# Patient Record
Sex: FEMALE | Race: BLACK OR AFRICAN AMERICAN | NOT HISPANIC OR LATINO | Employment: OTHER | URBAN - METROPOLITAN AREA
[De-identification: names, ages, dates, MRNs, and addresses within clinical notes are randomized per-mention and may not be internally consistent; named-entity substitution may affect disease eponyms.]

---

## 2017-01-23 RX ORDER — ROSUVASTATIN CALCIUM 10 MG/1
TABLET, COATED ORAL
Qty: 30 TABLET | Refills: 6 | Status: SHIPPED | OUTPATIENT
Start: 2017-01-23 | End: 2017-08-29 | Stop reason: SDUPTHER

## 2017-03-09 ENCOUNTER — PATIENT OUTREACH (OUTPATIENT)
Dept: ADMINISTRATIVE | Facility: HOSPITAL | Age: 72
End: 2017-03-09
Payer: MEDICARE

## 2017-03-09 DIAGNOSIS — M89.9 BONE DISORDER: Primary | ICD-10-CM

## 2017-03-09 NOTE — LETTER
March 9, 2017    Natalia R Mike  [address] [address]             [facility]  [address] [facility] [address]  Phone: [phone] Dear Mrs. Mckeon:    [facility] is committed to your overall health.  To help you get the most out of each of your visits, we will review your information to make sure you are up to date on all of your recommended tests and/or procedures.      Jen Santos MD has found that you may be due for          DEXA SCAN     Zoster Vaccine     Mammogram     Pneumococcal (65+) (2 of 2 - PPSV23)    Eye Exam         If you have had any of the above done at another facility, please bring the records or information with you so that your record at [facility] will be complete.    If you are currently taking medication, please bring it with you to your appointment for review.    We will be happy to assist you with scheduling any necessary appointments or you may contact the [facility] appointment desk at [phone] to schedule at your convenience.     Thank you for choosing [facility] for your healthcare needs,        Bobbye, LPN  Care Coordination Department  [facility]

## 2017-03-17 ENCOUNTER — LAB VISIT (OUTPATIENT)
Dept: LAB | Facility: HOSPITAL | Age: 72
End: 2017-03-17
Attending: INTERNAL MEDICINE
Payer: MEDICARE

## 2017-03-17 DIAGNOSIS — E11.43 CONTROLLED TYPE 2 DIABETES MELLITUS WITH DIABETIC AUTONOMIC NEUROPATHY, WITHOUT LONG-TERM CURRENT USE OF INSULIN: ICD-10-CM

## 2017-03-17 PROCEDURE — 36415 COLL VENOUS BLD VENIPUNCTURE: CPT | Mod: PO

## 2017-03-17 PROCEDURE — 83036 HEMOGLOBIN GLYCOSYLATED A1C: CPT

## 2017-03-18 LAB
ESTIMATED AVG GLUCOSE: 151 MG/DL
HBA1C MFR BLD HPLC: 6.9 %

## 2017-03-24 ENCOUNTER — TELEPHONE (OUTPATIENT)
Dept: INTERNAL MEDICINE | Facility: CLINIC | Age: 72
End: 2017-03-24

## 2017-03-24 ENCOUNTER — HOSPITAL ENCOUNTER (OUTPATIENT)
Dept: RADIOLOGY | Facility: HOSPITAL | Age: 72
Discharge: HOME | End: 2017-03-24
Attending: INTERNAL MEDICINE
Payer: MEDICARE

## 2017-03-24 ENCOUNTER — OFFICE VISIT (OUTPATIENT)
Dept: INTERNAL MEDICINE | Facility: CLINIC | Age: 72
End: 2017-03-24
Payer: MEDICARE

## 2017-03-24 VITALS
HEIGHT: 63 IN | SYSTOLIC BLOOD PRESSURE: 118 MMHG | BODY MASS INDEX: 24.92 KG/M2 | HEART RATE: 74 BPM | WEIGHT: 140.63 LBS | TEMPERATURE: 97 F | OXYGEN SATURATION: 97 % | DIASTOLIC BLOOD PRESSURE: 64 MMHG

## 2017-03-24 DIAGNOSIS — R10.9 LEFT FLANK PAIN: ICD-10-CM

## 2017-03-24 DIAGNOSIS — I10 ESSENTIAL HYPERTENSION: ICD-10-CM

## 2017-03-24 DIAGNOSIS — E11.41 CONTROLLED TYPE 2 DIABETES MELLITUS WITH DIABETIC MONONEUROPATHY, WITHOUT LONG-TERM CURRENT USE OF INSULIN: ICD-10-CM

## 2017-03-24 DIAGNOSIS — F41.9 ANXIETY: Primary | ICD-10-CM

## 2017-03-24 DIAGNOSIS — J45.20 MILD INTERMITTENT ASTHMA WITHOUT COMPLICATION: ICD-10-CM

## 2017-03-24 PROCEDURE — 99214 OFFICE O/P EST MOD 30 MIN: CPT | Mod: S$PBB,,, | Performed by: INTERNAL MEDICINE

## 2017-03-24 PROCEDURE — 74000 XR ABDOMEN AP 1 VIEW: CPT | Mod: TC,PO

## 2017-03-24 PROCEDURE — 99999 PR PBB SHADOW E&M-EST. PATIENT-LVL III: CPT | Mod: PBBFAC,,, | Performed by: INTERNAL MEDICINE

## 2017-03-24 PROCEDURE — 74000 XR ABDOMEN AP 1 VIEW: CPT | Mod: 26,,, | Performed by: RADIOLOGY

## 2017-03-24 RX ORDER — CIPROFLOXACIN 250 MG/1
250 TABLET, FILM COATED ORAL 2 TIMES DAILY
Qty: 14 TABLET | Refills: 0 | Status: SHIPPED | OUTPATIENT
Start: 2017-03-24 | End: 2017-03-31

## 2017-03-24 NOTE — PROGRESS NOTES
"Subjective:       Patient ID: Natalia Mckeon is a 72 y.o. female.    Chief Complaint: Follow-up    HPI Comments: Here for follow up of medical problems and intermittent left flank pain.  Pain is sharp, not radiating to front, once to twice a week.  Sx present for 2 years.  No dysuria.  BMs have been regular and normal.  Not exercising.  Sugars  ac breakfast.  No cp/sob/palp.  Anxiety doing well.    Updated/ annual due 11/17:  HM: 11/16 fluvax, 7/15 [id number], 2013 [id number] in [place], 6/14 TDaP, 10/14 zostervax at pharmacy, 6/16 mmg/Gyn Dr. Mariscal, bmd per her, 3/14 Cscope Dr. Auguste rep 3y, 2/16 Eye Dr. Chairez, 11/16 HCV neg.        Review of Systems   Constitutional: Negative for chills, diaphoresis and fever.   Respiratory: Negative for cough and shortness of breath.    Cardiovascular: Negative for chest pain, palpitations and leg swelling.   Gastrointestinal: Negative for blood in stool, constipation, diarrhea, nausea and vomiting.   Genitourinary: Negative for dysuria, frequency and hematuria.   Psychiatric/Behavioral: The patient is not nervous/anxious.        Objective:   /64 (BP Location: Right arm)  Pulse 74  Temp 97 °F (36.1 °C) (Tympanic)   Ht 5' 2.5" (1.588 m)  Wt 63.8 kg (140 lb 10.5 oz)  SpO2 97%  BMI 25.32 kg/m2    Physical Exam   Constitutional: She is oriented to person, place, and time. She appears well-developed.   HENT:   Mouth/Throat: Oropharynx is clear and moist.   Neck: Neck supple. Carotid bruit is not present. No thyroid mass present.   Cardiovascular: Normal rate, regular rhythm and intact distal pulses.  Exam reveals no gallop and no friction rub.    No murmur heard.  Pulmonary/Chest: Effort normal and breath sounds normal. She has no wheezes. She has no rales.   Abdominal: Soft. Bowel sounds are normal. She exhibits no mass. There is no hepatosplenomegaly. There is no tenderness (nontender left flank.).   Musculoskeletal: She exhibits no edema.   Lymphadenopathy:     She " has no cervical adenopathy.   Neurological: She is alert and oriented to person, place, and time.   Psychiatric: She has a normal mood and affect.     Results for OTILIO PEOPLES (MRN [id number]) as of 3/24/2017 08:46   Ref. Range 10/26/2016 08:07 10/26/2016 08:07 10/26/2016 09:24 3/17/2017 09:43   Hemoglobin A1C Latest Ref Range: 4.5 - 6.2 % 6.8 (H)   6.9 (H)   Estimated Avg Glucose Latest Ref Range: 68 - 131 mg/dL 148 (H)   151 (H)     Assessment:       1. Anxiety    2. Essential hypertension    3. Mild intermittent asthma without complication    4. Controlled type 2 diabetes mellitus with diabetic mononeuropathy, without long-term current use of insulin    5. Left flank pain        Plan:       Otilio was seen today for follow-up.    Diagnoses and all orders for this visit:    Anxiety- stable on rx.    Essential hypertension- stable on rx.    Mild intermittent asthma without complication- stable on rx.    Controlled type 2 diabetes mellitus with diabetic mononeuropathy, without long-term current use of insulin- stable.    Left flank pain- more frequency now- tests with MS, poss u/s abd.  -     Urinalysis; Future  -     X-Ray Abdomen AP 1 View; Future    RTC 2mo.

## 2017-03-24 NOTE — MR AVS SNAPSHOT
[facility]  [address] [place] [address]  Phone: [phone]  Fax: [phone]                  Natalia Mckeon   3/24/2017 8:40 AM   Office Visit    Description:  Female : 1945   Provider:  Jen Carlton MD   Department:  [facility]           Reason for Visit     Follow-up           Diagnoses this Visit        Comments    Anxiety    -  Primary     Essential hypertension         Mild intermittent asthma without complication         Controlled type 2 diabetes mellitus with diabetic mononeuropathy, without long-term current use of insulin         Left flank pain                To Do List           Future Appointments        Provider Department Dept Phone    3/24/2017 10:45 AM [facility] XR2 [facility] [phone]    3/24/2017 11:20 AM SPECIMEN, [facility] [phone]    3/24/2017 1:00 PM Jen Carlton MD [facility] [phone]    2017 4:40 PM Jen Carlton MD [facility] [phone]      Goals (5 Years of Data)     None      Follow-Up and Disposition     Return in about 2 months (around 2017).    Follow-up and Disposition History      [facility]     [facility] Care Line -  Assistance  Registered nurses in the [facility] provide clinical advisement, health education, appointment booking, and other advisory services.  Call for this free service at [phone].             Medications           Message regarding Medications     Verify the changes and/or additions to your medication regime listed below are the same as discussed with your clinician today.  If any of these changes or additions are incorrect, please notify your healthcare provider.             Verify that the below list of medications is an accurate representation of the medications you are currently taking.  If none reported, the list may be blank. If incorrect, please  "contact your healthcare provider. Carry this list with you in case of emergency.           Current Medications     ACCU-CHEK COMPACT TEST Strp TEST three times a day    ACCU-CHEK COMPACT TEST Strp TEST three times a day    CALCIUM CARBONATE (CALCIUM 300 ORAL) Take by mouth.    cyanocobalamin, vitamin B-12, (VITAMIN B-12) 5,000 mcg Subl Place under the tongue.    fluticasone (FLONASE) 50 mcg/actuation nasal spray 2 sprays by Each Nare route once daily.    levothyroxine (SYNTHROID) 25 MCG tablet take 1 tablet by mouth once daily    metformin (GLUCOPHAGE) 500 MG tablet take 1 tablet by mouth twice a day with food    olmesartan-amlodipin-hcthiazid (TRIBENZOR) 40-10-12.5 mg Tab Take by mouth.    omeprazole (PRILOSEC) 40 MG capsule Take 1 capsule (40 mg total) by mouth once daily.    PHENAZOPYRIDINE HCL (AZO ORAL) Take by mouth.    polyethylene glycol (GLYCOLAX) 17 gram/dose powder     potassium chloride SA (K-DUR,KLOR-CON) 20 MEQ tablet take 1 tablet by mouth once daily    rosuvastatin (CRESTOR) 10 MG tablet take 1 tablet by mouth once daily    sertraline (ZOLOFT) 50 MG tablet take 1 1/2 tablet by mouth every evening    fluticasone-salmeterol 250-50 mcg/dose (ADVAIR) 250-50 mcg/dose diskus inhaler Inhale 1 puff into the lungs 2 (two) times daily.           Clinical Reference Information           Your Vitals Were     BP Pulse Temp Height Weight SpO2    118/64 (BP Location: Right arm) 74 97 °F (36.1 °C) (Tympanic) 5' 2.5" (1.588 m) 63.8 kg (140 lb 10.5 oz) 97%    BMI                25.32 kg/m2          Blood Pressure          Most Recent Value    BP  118/64      Allergies as of 3/24/2017     Sulfa (Sulfonamide Antibiotics)    Aspirin    Methocarbamol      Immunizations Administered on Date of Encounter - 3/24/2017     None      Orders Placed During Today's Visit     Future Labs/Procedures Expected by Expires    Urinalysis  3/24/2017 6/22/2017    X-Ray Abdomen AP 1 View  3/24/2017 3/24/2018      [facility] Sign-Up     " Activating your [facility] account is as easy as 1-2-3!     1) Visit [email or web address], select Sign Up Now, enter this activation code and your date of birth, then select Next.  [email or web address]  Expires: 5/8/2017  9:05 AM      2) Create a username and password to use when you visit [facility] in the future and select a security question in case you lose your password and select Next.    3) Enter your e-mail address and click Sign Up!    Additional Information  If you have questions, please e-mail [email or web address] or call [phone] to talk to our [facility] staff. Remember, [facility] is NOT to be used for urgent needs. For medical emergencies, dial 911.         Language Assistance Services     ATTENTION: Language assistance services are available, free of charge. Please call [phone].      ATENCIÓN: Si habla henna, tiene a hardin disposición servicios gratuitos de asistencia lingüística. Llame al [phone].     CHÚ Ý: N?u b?n nói Ti?ng Vi?t, có các d?ch v? h? tr? ngôn ng? mi?n phí dành cho b?n. G?i s? [phone].         [facility] complies with applicable Federal civil rights laws and does not discriminate on the basis of race, color, national origin, age, disability, or sex.

## 2017-03-24 NOTE — TELEPHONE ENCOUNTER
Please tell pt she does have urine infection, rx sent to her RA to take for a week.  KUB doesn't show kidney stone but there is lots of stool present.  I recommend a half to whole bottle of Magnesium citrate to clear out her bowels that may be contributing to her pain.  Let me know if flank pain persists after these treatments.  SM

## 2017-05-19 ENCOUNTER — TELEPHONE (OUTPATIENT)
Dept: INTERNAL MEDICINE | Facility: CLINIC | Age: 72
End: 2017-05-19

## 2017-05-19 NOTE — TELEPHONE ENCOUNTER
Would you like to order labs and I can schedule her for fasting labs before her appointment, last full labs were 10/26/2016    Not due for routine labs until Nov, but does need to come in.  SM

## 2017-05-19 NOTE — TELEPHONE ENCOUNTER
----- Message from Milla Jay sent at 5/18/2017 12:13 PM CDT -----  Contact: pt   Pt is calling nurse staff regarding if patient need lab work before appt. Pt call back [phone] to be advised. thanks

## 2017-05-24 ENCOUNTER — OFFICE VISIT (OUTPATIENT)
Dept: INTERNAL MEDICINE | Facility: CLINIC | Age: 72
End: 2017-05-24
Payer: MEDICARE

## 2017-05-24 VITALS
BODY MASS INDEX: 24.96 KG/M2 | OXYGEN SATURATION: 97 % | HEIGHT: 63 IN | TEMPERATURE: 98 F | HEART RATE: 74 BPM | DIASTOLIC BLOOD PRESSURE: 66 MMHG | WEIGHT: 140.88 LBS | SYSTOLIC BLOOD PRESSURE: 110 MMHG

## 2017-05-24 DIAGNOSIS — E11.40 CONTROLLED TYPE 2 DIABETES MELLITUS WITH DIABETIC NEUROPATHY, WITHOUT LONG-TERM CURRENT USE OF INSULIN: ICD-10-CM

## 2017-05-24 DIAGNOSIS — E55.9 VITAMIN D DEFICIENCY: ICD-10-CM

## 2017-05-24 DIAGNOSIS — I10 ESSENTIAL HYPERTENSION: ICD-10-CM

## 2017-05-24 DIAGNOSIS — J45.20 MILD INTERMITTENT ASTHMA WITHOUT COMPLICATION: ICD-10-CM

## 2017-05-24 DIAGNOSIS — F41.9 ANXIETY: ICD-10-CM

## 2017-05-24 DIAGNOSIS — E78.00 PURE HYPERCHOLESTEROLEMIA: Primary | ICD-10-CM

## 2017-05-24 DIAGNOSIS — Z29.9 PREVENTIVE MEASURE: ICD-10-CM

## 2017-05-24 PROCEDURE — 99214 OFFICE O/P EST MOD 30 MIN: CPT | Mod: S$PBB,,, | Performed by: INTERNAL MEDICINE

## 2017-05-24 PROCEDURE — 99999 PR PBB SHADOW E&M-EST. PATIENT-LVL III: CPT | Mod: PBBFAC,,, | Performed by: INTERNAL MEDICINE

## 2017-05-24 PROCEDURE — 99213 OFFICE O/P EST LOW 20 MIN: CPT | Mod: PBBFAC,PO | Performed by: INTERNAL MEDICINE

## 2017-05-24 NOTE — PROGRESS NOTES
"Subjective:       Patient ID: Natalia Mckeon is a 72 y.o. female.    Chief Complaint: Follow-up    Here for follow up of medical problems.  BP max at home sys 130.  Will see Gyn next mo with mmg.  Sugars  ac breakfast.  No f/c/sw/cough.  No cp/sob/palp.  Breathing well.  Takes advair only "as needed."    Updated/ annual due 11/17:  HM: 11/16 fluvax, 7/15 [id number], 2013 [id number] in [place], 6/14 TDaP, 10/14 zostervax at [facility], 6/16 mmg/Gyn Dr. Mariscal, bmd per her, 3/14 Cscope Dr. Auguste rep 4y, 2/16 Eye Dr. Chairez, 11/16 HCV neg.          Review of Systems   Constitutional: Negative for chills, diaphoresis and fever.   Respiratory: Negative for cough and shortness of breath.    Cardiovascular: Negative for chest pain, palpitations and leg swelling.   Gastrointestinal: Negative for blood in stool, constipation, diarrhea, nausea and vomiting.   Genitourinary: Negative for dysuria, frequency and hematuria.   Psychiatric/Behavioral: The patient is not nervous/anxious.        Objective:   /66 (BP Location: Right arm)   Pulse 74   Temp 97.9 °F (36.6 °C) (Tympanic)   Ht 5' 2.5" (1.588 m)   Wt 63.9 kg (140 lb 14 oz)   SpO2 97%   BMI 25.36 kg/m²     Physical Exam   Constitutional: She is oriented to person, place, and time. She appears well-developed.   HENT:   Mouth/Throat: Oropharynx is clear and moist.   Neck: Neck supple. Carotid bruit is not present. No thyroid mass present.   Cardiovascular: Normal rate, regular rhythm and intact distal pulses.  Exam reveals no gallop and no friction rub.    No murmur heard.  Pulmonary/Chest: Effort normal and breath sounds normal. She has no wheezes. She has no rales.   Abdominal: Soft. Bowel sounds are normal. She exhibits no mass. There is no hepatosplenomegaly. There is no tenderness.   Musculoskeletal: She exhibits no edema.   Lymphadenopathy:     She has no cervical adenopathy.   Neurological: She is alert and oriented to person, place, and time. "   Psychiatric: She has a normal mood and affect.     Results for OTILIO PEOPLES (MRN [id number]) as of 5/24/2017 16:40   Ref. Range 3/17/2017 09:43   Hemoglobin A1C Latest Ref Range: 4.5 - 6.2 % 6.9 (H)   Estimated Avg Glucose Latest Ref Range: 68 - 131 mg/dL 151 (H)     Assessment:       1. Pure hypercholesterolemia    2. Essential hypertension    3. Mild intermittent asthma without complication    4. Controlled type 2 diabetes mellitus with diabetic neuropathy, without long-term current use of insulin    5. Anxiety    6. Vitamin D deficiency    7. Preventive measure        Plan:       Otilio was seen today for follow-up.    Diagnoses and all orders for this visit:    Pure hypercholesterolemia- on statin, allergy to asa.    Essential hypertension- stable on rx.    Mild intermittent asthma without complication- stable.    Controlled type 2 diabetes mellitus with diabetic neuropathy, without long-term current use of insulin- doing well on metformin only.  -     Hemoglobin A1c; Future  -     Microalbumin/creatinine urine ratio; Future    Anxiety- stable on sertraline.    Vitamin D deficiency- check lab.  -     Vitamin D; Future    Preventive measure- due in 6mo.  -     Comprehensive metabolic panel; Future  -     Lipid panel; Future  -     CBC auto differential; Future  -     TSH; Future  -     Vitamin D; Future  -     Hemoglobin A1c; Future  -     Microalbumin/creatinine urine ratio; Future

## 2017-07-27 DIAGNOSIS — E11.9 TYPE 2 DIABETES MELLITUS WITHOUT COMPLICATION: ICD-10-CM

## 2017-07-27 RX ORDER — METFORMIN HYDROCHLORIDE 500 MG/1
TABLET ORAL
Qty: 60 TABLET | Refills: 6 | Status: SHIPPED | OUTPATIENT
Start: 2017-07-27 | End: 2017-11-29 | Stop reason: SDUPTHER

## 2017-07-31 DIAGNOSIS — R10.13 DYSPEPSIA: ICD-10-CM

## 2017-07-31 RX ORDER — OMEPRAZOLE 40 MG/1
CAPSULE, DELAYED RELEASE ORAL
Qty: 30 CAPSULE | Refills: 11 | Status: SHIPPED | OUTPATIENT
Start: 2017-07-31 | End: 2018-09-05 | Stop reason: SDUPTHER

## 2017-08-22 DIAGNOSIS — F41.9 ANXIETY: ICD-10-CM

## 2017-08-22 RX ORDER — SERTRALINE HYDROCHLORIDE 50 MG/1
TABLET, FILM COATED ORAL
Qty: 45 TABLET | Refills: 6 | Status: SHIPPED | OUTPATIENT
Start: 2017-08-22 | End: 2020-07-28 | Stop reason: SDUPTHER

## 2017-08-29 RX ORDER — ROSUVASTATIN CALCIUM 10 MG/1
TABLET, COATED ORAL
Qty: 30 TABLET | Refills: 6 | Status: SHIPPED | OUTPATIENT
Start: 2017-08-29 | End: 2018-05-30 | Stop reason: SDUPTHER

## 2017-11-16 ENCOUNTER — PATIENT OUTREACH (OUTPATIENT)
Dept: ADMINISTRATIVE | Facility: HOSPITAL | Age: 72
End: 2017-11-16

## 2017-11-16 NOTE — LETTER
November 16, 2017    Natalia R Mike  [address] [address]             [facility]  [address]  [facility] [address]  Phone: [phone] Dear Mrs. Mckeon:    [facility] is committed to your overall health.  To help you get the most out of each of your visits, we will review your information to make sure you are up to date on all of your recommended tests and/or procedures.      Jen Santos MD has found that you may be due for   Health Maintenance Due   Topic    DEXA SCAN     Zoster Vaccine     Pneumococcal (65+) (2 of 2 - PPSV23)    Influenza Vaccine     Eye Exam     Foot Exam         If you have had any of the above done at another facility, please bring the records or information with you so that your record at [facility] will be complete.    If you are currently taking medication, please bring it with you to your appointment for review.    We will be happy to assist you with scheduling any necessary appointments or you may contact the [facility] appointment desk at [phone] to schedule at your convenience.     Thank you for choosing [facility] for your healthcare needs,        Bobbye, LPN  Care Coordination Department  [facility]  [phone]

## 2017-11-22 ENCOUNTER — LAB VISIT (OUTPATIENT)
Dept: LAB | Facility: HOSPITAL | Age: 72
End: 2017-11-22
Attending: INTERNAL MEDICINE
Payer: MEDICARE

## 2017-11-22 DIAGNOSIS — Z29.9 PREVENTIVE MEASURE: ICD-10-CM

## 2017-11-22 DIAGNOSIS — E55.9 VITAMIN D DEFICIENCY: ICD-10-CM

## 2017-11-22 DIAGNOSIS — E11.40 CONTROLLED TYPE 2 DIABETES MELLITUS WITH DIABETIC NEUROPATHY, WITHOUT LONG-TERM CURRENT USE OF INSULIN: ICD-10-CM

## 2017-11-22 DIAGNOSIS — E78.00 PURE HYPERCHOLESTEROLEMIA: ICD-10-CM

## 2017-11-22 LAB
25(OH)D3+25(OH)D2 SERPL-MCNC: 43 NG/ML
ALBUMIN SERPL BCP-MCNC: 4 G/DL
ALP SERPL-CCNC: 83 U/L
ALT SERPL W/O P-5'-P-CCNC: 18 U/L
ANION GAP SERPL CALC-SCNC: 6 MMOL/L
AST SERPL-CCNC: 23 U/L
BASOPHILS # BLD AUTO: 0.02 K/UL
BASOPHILS NFR BLD: 0.4 %
BILIRUB SERPL-MCNC: 0.8 MG/DL
BUN SERPL-MCNC: 13 MG/DL
CALCIUM SERPL-MCNC: 9 MG/DL
CHLORIDE SERPL-SCNC: 104 MMOL/L
CHOLEST SERPL-MCNC: 156 MG/DL
CHOLEST/HDLC SERPL: 2.2 {RATIO}
CO2 SERPL-SCNC: 29 MMOL/L
CREAT SERPL-MCNC: 1 MG/DL
DIFFERENTIAL METHOD: ABNORMAL
EOSINOPHIL # BLD AUTO: 0.1 K/UL
EOSINOPHIL NFR BLD: 1.8 %
ERYTHROCYTE [DISTWIDTH] IN BLOOD BY AUTOMATED COUNT: 14.7 %
EST. GFR  (AFRICAN AMERICAN): >60 ML/MIN/1.73 M^2
EST. GFR  (NON AFRICAN AMERICAN): 56.4 ML/MIN/1.73 M^2
ESTIMATED AVG GLUCOSE: 143 MG/DL
GLUCOSE SERPL-MCNC: 120 MG/DL
HBA1C MFR BLD HPLC: 6.6 %
HCT VFR BLD AUTO: 37.5 %
HDLC SERPL-MCNC: 70 MG/DL
HDLC SERPL: 44.9 %
HGB BLD-MCNC: 12.1 G/DL
IMM GRANULOCYTES # BLD AUTO: 0.01 K/UL
IMM GRANULOCYTES NFR BLD AUTO: 0.2 %
LDLC SERPL CALC-MCNC: 74.4 MG/DL
LYMPHOCYTES # BLD AUTO: 1.2 K/UL
LYMPHOCYTES NFR BLD: 21.6 %
MCH RBC QN AUTO: 26.9 PG
MCHC RBC AUTO-ENTMCNC: 32.3 G/DL
MCV RBC AUTO: 83 FL
MONOCYTES # BLD AUTO: 0.3 K/UL
MONOCYTES NFR BLD: 5.7 %
NEUTROPHILS # BLD AUTO: 3.8 K/UL
NEUTROPHILS NFR BLD: 70.3 %
NONHDLC SERPL-MCNC: 86 MG/DL
NRBC BLD-RTO: 0 /100 WBC
PLATELET # BLD AUTO: 180 K/UL
PMV BLD AUTO: 11.4 FL
POTASSIUM SERPL-SCNC: 4.8 MMOL/L
PROT SERPL-MCNC: 7.8 G/DL
RBC # BLD AUTO: 4.5 M/UL
SODIUM SERPL-SCNC: 139 MMOL/L
TRIGL SERPL-MCNC: 58 MG/DL
TSH SERPL DL<=0.005 MIU/L-ACNC: 2.45 UIU/ML
WBC # BLD AUTO: 5.42 K/UL

## 2017-11-22 PROCEDURE — 36415 COLL VENOUS BLD VENIPUNCTURE: CPT | Mod: PO

## 2017-11-22 PROCEDURE — 80053 COMPREHEN METABOLIC PANEL: CPT

## 2017-11-22 PROCEDURE — 80061 LIPID PANEL: CPT

## 2017-11-22 PROCEDURE — 82306 VITAMIN D 25 HYDROXY: CPT

## 2017-11-22 PROCEDURE — 84443 ASSAY THYROID STIM HORMONE: CPT

## 2017-11-22 PROCEDURE — 85025 COMPLETE CBC W/AUTO DIFF WBC: CPT

## 2017-11-22 PROCEDURE — 83036 HEMOGLOBIN GLYCOSYLATED A1C: CPT

## 2017-11-29 ENCOUNTER — OFFICE VISIT (OUTPATIENT)
Dept: INTERNAL MEDICINE | Facility: CLINIC | Age: 72
End: 2017-11-29
Payer: MEDICARE

## 2017-11-29 VITALS
SYSTOLIC BLOOD PRESSURE: 134 MMHG | BODY MASS INDEX: 25.19 KG/M2 | WEIGHT: 136.88 LBS | OXYGEN SATURATION: 97 % | DIASTOLIC BLOOD PRESSURE: 78 MMHG | HEIGHT: 62 IN | TEMPERATURE: 98 F | HEART RATE: 84 BPM

## 2017-11-29 DIAGNOSIS — E11.42 CONTROLLED TYPE 2 DIABETES MELLITUS WITH DIABETIC POLYNEUROPATHY, WITHOUT LONG-TERM CURRENT USE OF INSULIN: ICD-10-CM

## 2017-11-29 DIAGNOSIS — E78.00 PURE HYPERCHOLESTEROLEMIA: ICD-10-CM

## 2017-11-29 DIAGNOSIS — F41.9 ANXIETY: ICD-10-CM

## 2017-11-29 DIAGNOSIS — E11.3511 TYPE 2 DIABETES MELLITUS WITH RIGHT EYE AFFECTED BY PROLIFERATIVE RETINOPATHY AND MACULAR EDEMA, WITHOUT LONG-TERM CURRENT USE OF INSULIN: ICD-10-CM

## 2017-11-29 DIAGNOSIS — J45.20 MILD INTERMITTENT ASTHMA WITHOUT COMPLICATION: ICD-10-CM

## 2017-11-29 DIAGNOSIS — Z00.00 ENCOUNTER FOR PREVENTIVE HEALTH EXAMINATION: ICD-10-CM

## 2017-11-29 DIAGNOSIS — I10 ESSENTIAL HYPERTENSION: Primary | ICD-10-CM

## 2017-11-29 DIAGNOSIS — E55.9 VITAMIN D DEFICIENCY: ICD-10-CM

## 2017-11-29 PROBLEM — E11.3599 TYPE 2 DIABETES MELLITUS WITH PROLIFERATIVE RETINOPATHY: Status: ACTIVE | Noted: 2017-11-29

## 2017-11-29 PROCEDURE — 99999 PR PBB SHADOW E&M-EST. PATIENT-LVL III: CPT | Mod: PBBFAC,,, | Performed by: INTERNAL MEDICINE

## 2017-11-29 PROCEDURE — 99213 OFFICE O/P EST LOW 20 MIN: CPT | Mod: PBBFAC,PO | Performed by: INTERNAL MEDICINE

## 2017-11-29 PROCEDURE — 99214 OFFICE O/P EST MOD 30 MIN: CPT | Mod: S$PBB,,, | Performed by: INTERNAL MEDICINE

## 2017-11-29 RX ORDER — NAPROXEN SODIUM 220 MG/1
81 TABLET, FILM COATED ORAL EVERY OTHER DAY
Refills: 0
Start: 2017-11-29

## 2017-11-29 NOTE — PROGRESS NOTES
"Subjective:       Patient ID: Natalia Mckeon is a 72 y.o. female.    Chief Complaint: Follow-up    Here for f/u medical problems and preventive exam.  Energy good.  Exercise classes 3d per week, plus walking.  No f/c/sw/cough.  No cp/sob/palp.  BMs and urine normal.  Not taking vit D.  AC breakfast .  Anxiety doing well on current rx.    HM: 11/17 fluvax, 7/15 [id number], 2013 [id number] in [place], 6/14 TDaP, 10/14 zostervax at pharmacy, 6/17 mmg/Gyn Dr. Mariscal, bmd per her, 3/14 Cscope Dr. Auguste rep 4y, 2/16 Eye Dr. Chairez, 11/16 HCV neg.          Review of Systems   Constitutional: Negative for appetite change, chills, diaphoresis and fever.   HENT: Negative for congestion, ear pain, rhinorrhea, sinus pressure and sore throat.    Respiratory: Negative for cough, chest tightness and shortness of breath.    Cardiovascular: Negative for chest pain and palpitations.   Gastrointestinal: Negative for blood in stool, constipation, diarrhea, nausea and vomiting.   Genitourinary: Negative for dysuria, frequency, hematuria, menstrual problem, urgency and vaginal discharge.   Musculoskeletal: Negative for arthralgias.   Skin: Negative for rash.   Neurological: Negative for dizziness and headaches.   Psychiatric/Behavioral: Negative for sleep disturbance. The patient is not nervous/anxious.        Objective:   /78 (BP Location: Left arm, Patient Position: Sitting, BP Method: Small (Manual))   Pulse 84   Temp 97.8 °F (36.6 °C) (Tympanic)   Ht 5' 2" (1.575 m)   Wt 62.1 kg (136 lb 14.5 oz)   SpO2 97%   BMI 25.04 kg/m²     Physical Exam   Constitutional: She is oriented to person, place, and time. She appears well-developed and well-nourished.   HENT:   Right Ear: External ear normal. Tympanic membrane is not injected.   Left Ear: External ear normal. Tympanic membrane is not injected.   Mouth/Throat: Oropharynx is clear and moist.   Eyes: Conjunctivae are normal.   Neck: Normal range of motion. Neck supple. No " thyromegaly present.   Cardiovascular: Normal rate, regular rhythm and intact distal pulses.  Exam reveals no gallop and no friction rub.    No murmur heard.  Pulses:       Dorsalis pedis pulses are 2+ on the right side, and 2+ on the left side.        Posterior tibial pulses are 2+ on the right side, and 2+ on the left side.   Pulmonary/Chest: Effort normal and breath sounds normal. She has no wheezes. She has no rales.   Abdominal: Soft. Bowel sounds are normal. She exhibits no mass. There is no tenderness.   Musculoskeletal: She exhibits no edema.   Feet:   Right Foot:   Protective Sensation: 10 sites tested. 10 sites sensed.   Skin Integrity: Negative for ulcer, blister, skin breakdown, erythema, warmth, callus or dry skin.   Left Foot:   Protective Sensation: 10 sites tested. 10 sites sensed.   Skin Integrity: Negative for ulcer, blister, skin breakdown, erythema, warmth, callus or dry skin.   Lymphadenopathy:     She has no cervical adenopathy.   Neurological: She is alert and oriented to person, place, and time.   Skin: Skin is warm. No rash noted.   Psychiatric: She has a normal mood and affect.           Results for OTILIO PEOPLES (MRN [id number]) as of 11/29/2017 07:44   Ref. Range 11/22/2017 11:54 11/22/2017 12:20   WBC Latest Ref Range: 3.90 - 12.70 K/uL  5.42   RBC Latest Ref Range: 4.00 - 5.40 M/uL  4.50   Hemoglobin Latest Ref Range: 12.0 - 16.0 g/dL  12.1   Hematocrit Latest Ref Range: 37.0 - 48.5 %  37.5   MCV Latest Ref Range: 82 - 98 fL  83   MCH Latest Ref Range: 27.0 - 31.0 pg  26.9 (L)   MCHC Latest Ref Range: 32.0 - 36.0 g/dL  32.3   RDW Latest Ref Range: 11.5 - 14.5 %  14.7 (H)   Platelets Latest Ref Range: 150 - 350 K/uL  180   MPV Latest Ref Range: 9.2 - 12.9 fL  11.4   Gran% Latest Ref Range: 38.0 - 73.0 %  70.3   Gran # Latest Ref Range: 1.8 - 7.7 K/uL  3.8   Immature Granulocytes Latest Ref Range: 0.0 - 0.5 %  0.2   Immature Grans (Abs) Latest Ref Range: 0.00 - 0.04 K/uL  0.01   Lymph%  Latest Ref Range: 18.0 - 48.0 %  21.6   Lymph # Latest Ref Range: 1.0 - 4.8 K/uL  1.2   Mono% Latest Ref Range: 4.0 - 15.0 %  5.7   Mono # Latest Ref Range: 0.3 - 1.0 K/uL  0.3   Eosinophil% Latest Ref Range: 0.0 - 8.0 %  1.8   Eos # Latest Ref Range: 0.0 - 0.5 K/uL  0.1   Basophil% Latest Ref Range: 0.0 - 1.9 %  0.4   Baso # Latest Ref Range: 0.00 - 0.20 K/uL  0.02   nRBC Latest Ref Range: 0 /100 WBC  0   Sodium Latest Ref Range: 136 - 145 mmol/L  139   Potassium Latest Ref Range: 3.5 - 5.1 mmol/L  4.8   Chloride Latest Ref Range: 95 - 110 mmol/L  104   CO2 Latest Ref Range: 23 - 29 mmol/L  29   Anion Gap Latest Ref Range: 8 - 16 mmol/L  6 (L)   BUN, Bld Latest Ref Range: 8 - 23 mg/dL  13   Creatinine Latest Ref Range: 0.5 - 1.4 mg/dL  1.0   eGFR if non African American Latest Ref Range: >60 mL/min/1.73 m^2  56.4 (A)   eGFR if African American Latest Ref Range: >60 mL/min/1.73 m^2  >60.0   Glucose Latest Ref Range: 70 - 110 mg/dL  120 (H)   Calcium Latest Ref Range: 8.7 - 10.5 mg/dL  9.0   Alkaline Phosphatase Latest Ref Range: 55 - 135 U/L  83   Total Protein Latest Ref Range: 6.0 - 8.4 g/dL  7.8   Albumin Latest Ref Range: 3.5 - 5.2 g/dL  4.0   Total Bilirubin Latest Ref Range: 0.1 - 1.0 mg/dL  0.8   AST Latest Ref Range: 10 - 40 U/L  23   ALT Latest Ref Range: 10 - 44 U/L  18   Triglycerides Latest Ref Range: 30 - 150 mg/dL  58   Cholesterol Latest Ref Range: 120 - 199 mg/dL  156   HDL Latest Ref Range: 40 - 75 mg/dL  70   LDL Cholesterol Latest Ref Range: 63.0 - 159.0 mg/dL  74.4   Total Cholesterol/HDL Ratio Latest Ref Range: 2.0 - 5.0   2.2   Vit D, 25-Hydroxy Latest Ref Range: 30 - 96 ng/mL  43   Hemoglobin A1C Latest Ref Range: 4.0 - 5.6 %  6.6 (H)   Estimated Avg Glucose Latest Ref Range: 68 - 131 mg/dL  143 (H)   TSH Latest Ref Range: 0.400 - 4.000 uIU/mL  2.449   Microalbum.,U,Random Latest Units: ug/mL 6.0    Microalb Creat Ratio Latest Ref Range: 0.0 - 30.0 ug/mg 7.9      Assessment:       1. Essential  hypertension    2. Controlled type 2 diabetes mellitus with diabetic polyneuropathy, without long-term current use of insulin    3. Anxiety    4. Pure hypercholesterolemia    5. Mild intermittent asthma without complication    6. 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 Vitamin D deficiency    8. Encounter for preventive health examination        Plan:       Natalia was seen today for follow-up.    Diagnoses and all orders for this visit:    Essential hypertension- stable on rx.    Controlled type 2 diabetes mellitus with diabetic polyneuropathy, with right eye affected by proliferative retinopathy and macular edema,  -     Ambulatory referral to Ophthalmology  -     Hemoglobin A1c; Future    Anxiety- doing well on rx.    Pure hypercholesterolemia- cont statin, add asa.  -     aspirin 81 MG Chew; Take 1 tablet (81 mg total) by mouth every other day.    Mild intermittent asthma without complication- stable.    Vitamin D deficiency- stable with diet.    Encounter for preventive health examination- utd.

## 2017-12-04 ENCOUNTER — OFFICE VISIT (OUTPATIENT)
Dept: OPHTHALMOLOGY | Facility: CLINIC | Age: 72
End: 2017-12-04
Payer: MEDICARE

## 2017-12-04 DIAGNOSIS — H40.003 GLAUCOMA SUSPECT OF BOTH EYES: ICD-10-CM

## 2017-12-04 DIAGNOSIS — E11.9 TYPE 2 DIABETES MELLITUS WITHOUT COMPLICATION, WITHOUT LONG-TERM CURRENT USE OF INSULIN: Primary | ICD-10-CM

## 2017-12-04 PROBLEM — E11.3599 TYPE 2 DIABETES MELLITUS WITH PROLIFERATIVE RETINOPATHY: Status: RESOLVED | Noted: 2017-11-29 | Resolved: 2017-12-04

## 2017-12-04 PROCEDURE — 99999 PR PBB SHADOW E&M-EST. PATIENT-LVL II: CPT | Mod: PBBFAC,,, | Performed by: OPHTHALMOLOGY

## 2017-12-04 PROCEDURE — 99212 OFFICE O/P EST SF 10 MIN: CPT | Mod: PBBFAC,PO | Performed by: OPHTHALMOLOGY

## 2017-12-04 PROCEDURE — 92014 COMPRE OPH EXAM EST PT 1/>: CPT | Mod: S$PBB,,, | Performed by: OPHTHALMOLOGY

## 2017-12-04 NOTE — PROGRESS NOTES
===============================  12/04/2017   Natalia Mckeon,   72 y.o. female   Last visit [facility]: :2/8/2016   Last visit eye dept. Visit date not found  [facility]:  Corrected distance visual acuity was 20/20 in the right eye and 20/20 -2 in the left eye.  Tonometry     Tonometry (Applanation, 9:19 AM)       Right Left    Pressure 24 22          Tonometry #2 (Applanation, 10:01 AM)       Right Left    Pressure 24 24              Wearing Rx     Wearing Rx       Sphere Cylinder Axis Add    Right -1.50 +0.75 037 no add    Left -2.00 +0.50 028 no add               Not recorded        Chief Complaint   Patient presents with    Diabetic Eye Exam     yearly diabetic exam        HPI     Diabetic Eye Exam    Additional comments: yearly diabetic exam           Comments   DM SINCE 2006  BDR  DME OD  VMA        Last edited by ROSE Chairez MD on 12/4/2017 10:05 AM. (History)          ________________  12/4/2017  Problem List Items Addressed This Visit     None      Visit Diagnoses     Type 2 diabetes mellitus without complication, without long-term current use of insulin    -  Primary    Glaucoma suspect of both eyes              .increased iop  No recent steroid injections  But on flonase daily  Patient finds it difficult to discontinue flonase  No bdr  Resolved OD vma    rtc in 3-4 weeks for cct, vf, goct, iop check       ===========================

## 2017-12-04 NOTE — LETTER
December 4, 2017      Jen Carlton MD  [address] [facility] [place] [address]           [facility] - Ophthalmology  [address] [place] [address]  Phone: [phone]  Fax: [phone]          Patient: Natalia Mckeon   MR Number: [id number]   YOB: 1945   Date of Visit: 12/4/2017       Dear Dr. Jen Carlton:    Thank you for referring Natalia Mckeon to me for evaluation. Attached you will find relevant portions of my assessment and plan of care.    If you have questions, please do not hesitate to call me. I look forward to following Natalia Mckeon along with you.    Sincerely,    ROSE Cahirez MD    Enclosure  CC:  No Recipients    If you would like to receive this communication electronically, please contact [email or web address] or [phone] to request more information on [facility] access.    For providers and/or their staff who would like to refer a patient to [facility], please contact us through our one-stop-shop provider referral line, [facility], at [phone].    If you feel you have received this communication in error or would no longer like to receive these types of communications, please e-mail [email or web address]

## 2018-01-02 ENCOUNTER — APPOINTMENT (OUTPATIENT)
Dept: OPHTHALMOLOGY | Facility: CLINIC | Age: 73
End: 2018-01-02
Payer: MEDICARE

## 2018-01-02 ENCOUNTER — OFFICE VISIT (OUTPATIENT)
Dept: OPHTHALMOLOGY | Facility: CLINIC | Age: 73
End: 2018-01-02
Payer: MEDICARE

## 2018-01-02 DIAGNOSIS — H40.1131 PRIMARY OPEN ANGLE GLAUCOMA OF BOTH EYES, MILD STAGE: Primary | ICD-10-CM

## 2018-01-02 DIAGNOSIS — Z83.511 FAMILY HISTORY OF GLAUCOMA: ICD-10-CM

## 2018-01-02 PROCEDURE — 76514 ECHO EXAM OF EYE THICKNESS: CPT | Mod: PBBFAC,PO | Performed by: OPHTHALMOLOGY

## 2018-01-02 PROCEDURE — 99212 OFFICE O/P EST SF 10 MIN: CPT | Mod: S$PBB,,, | Performed by: OPHTHALMOLOGY

## 2018-01-02 PROCEDURE — 99213 OFFICE O/P EST LOW 20 MIN: CPT | Mod: PBBFAC,PO,25 | Performed by: OPHTHALMOLOGY

## 2018-01-02 PROCEDURE — 92083 EXTENDED VISUAL FIELD XM: CPT | Mod: PBBFAC,PO | Performed by: OPHTHALMOLOGY

## 2018-01-02 PROCEDURE — 99999 PR PBB SHADOW E&M-EST. PATIENT-LVL III: CPT | Mod: PBBFAC,,, | Performed by: OPHTHALMOLOGY

## 2018-01-02 PROCEDURE — 92133 CPTRZD OPH DX IMG PST SGM ON: CPT | Mod: PBBFAC,PO | Performed by: OPHTHALMOLOGY

## 2018-01-02 NOTE — PROGRESS NOTES
===============================  01/02/2018   Natalia Mckeon,   72 y.o. female   Last visit [facility]: :12/4/2017   Last visit eye dept. 12/4/2017  [facility]:  Uncorrected distance visual acuity was 20/70-1 in the right eye and 20/60 in the left eye. Comments: Left glasses.  Tonometry     Tonometry (Applanation, 11:15 AM)       Right Left    Pressure 23 22               Not recorded         Not recorded        Chief Complaint   Patient presents with    Glaucoma Suspect     REV HVF / GOCT / CCT / IOP        HPI     Glaucoma Suspect    Additional comments: REV HVF / GOCT / CCT / IOP           Comments   Patient denies changes from last visit. Here for glaucoma testing.      DM SINCE 2006  BDR  DME OD  VMA  COAG SUSP OU       Last edited by Lachelle Stearns MA on 1/2/2018 10:49 AM. (History)          ________________  1/2/2018  Problem List Items Addressed This Visit        Eye/Vision problems    Primary open angle glaucoma of both eyes, mild stage - Primary    Relevant Orders    Ultrasound pachymetry (Completed)    Posterior Segment OCT Optic Nerve- Both eyes (Completed)    Carlos Visual Field - OU - Extended - Both Eyes (Completed)       Other    Family history of glaucoma    Overview     --brother had surgeries for glaucoma         Relevant Orders    Ultrasound pachymetry (Completed)    Posterior Segment OCT Optic Nerve- Both eyes (Completed)    Carlos Visual Field - OU - Extended - Both Eyes (Completed)          .  tmax 24  flonase dependant  Vf today normal   cct - 2  RNFL OD low normal, OS borderline ST low normal  Brother had surgery for glaucoma  Recommend see Dr. Phillip in 10 weeks, iop check, eval for tx.     ===========================

## 2018-03-13 ENCOUNTER — OFFICE VISIT (OUTPATIENT)
Dept: OPHTHALMOLOGY | Facility: CLINIC | Age: 73
End: 2018-03-13
Payer: MEDICARE

## 2018-03-13 DIAGNOSIS — Z83.511 FAMILY HISTORY OF GLAUCOMA: ICD-10-CM

## 2018-03-13 DIAGNOSIS — H40.1131 PRIMARY OPEN ANGLE GLAUCOMA OF BOTH EYES, MILD STAGE: Primary | ICD-10-CM

## 2018-03-13 PROCEDURE — 92020 GONIOSCOPY: CPT | Mod: S$PBB,,, | Performed by: OPHTHALMOLOGY

## 2018-03-13 PROCEDURE — 92020 GONIOSCOPY: CPT | Mod: PBBFAC,PO | Performed by: OPHTHALMOLOGY

## 2018-03-13 PROCEDURE — 99213 OFFICE O/P EST LOW 20 MIN: CPT | Mod: PBBFAC,PO,25 | Performed by: OPHTHALMOLOGY

## 2018-03-13 PROCEDURE — 92012 INTRM OPH EXAM EST PATIENT: CPT | Mod: S$PBB,,, | Performed by: OPHTHALMOLOGY

## 2018-03-13 PROCEDURE — 99999 PR PBB SHADOW E&M-EST. PATIENT-LVL III: CPT | Mod: PBBFAC,,, | Performed by: OPHTHALMOLOGY

## 2018-03-13 RX ORDER — KETOROLAC TROMETHAMINE 5 MG/ML
1 SOLUTION OPHTHALMIC 4 TIMES DAILY
Qty: 1 BOTTLE | Refills: 3 | Status: SHIPPED | OUTPATIENT
Start: 2018-03-13 | End: 2018-03-20

## 2018-03-13 NOTE — PROGRESS NOTES
HPI     Patient was referred by [facility] for glaucoma, last saw [facility] 10 weeks ago, [facility]   first informed patient approx. 6 months that she was a glaucoma suspect,   patient has never been on any drops, patient wants this issue addressed   only patient is not here for updated rx at this time.        +FAMHX-GLAUCOMA-BROTHER        DM SINCE 2006  BDR  DME OD  VMA  COAG SUSP OU ?  +FAMHX-GLAUCOMA-BROTHER  /585 1/2/18      NO DROPS    Last edited by ALONZO Wells on 3/13/2018 11:35 AM. (History)            Assessment /Plan     For exam results, see Encounter Report.      ICD-10-CM ICD-9-CM    1. Primary open angle glaucoma of both eyes, mild stage H40.1131 365.11 New diagnosis today Gonio Done today     IOP not within acceptable range relative to target IOP with risk of irreversible visual loss. Additional treatment required.  Discussed options, risks, and benefits of additional medication, SLT laser, or incisional glaucoma surgery.     recommend laser and/or meds    Patient chooses LASER ou FIRST     Reviewed importance of continued compliance with treatment and follow up.        365.71    2. Family history of glaucoma Z83.511 V19.11 Brother- follow        BOOK SLT LASER OU

## 2018-03-16 ENCOUNTER — TELEPHONE (OUTPATIENT)
Dept: OPHTHALMOLOGY | Facility: CLINIC | Age: 73
End: 2018-03-16

## 2018-04-23 DIAGNOSIS — E11.9 TYPE 2 DIABETES MELLITUS WITHOUT COMPLICATION: ICD-10-CM

## 2018-04-23 RX ORDER — METFORMIN HYDROCHLORIDE 500 MG/1
TABLET ORAL
Qty: 60 TABLET | Refills: 6 | Status: SHIPPED | OUTPATIENT
Start: 2018-04-23 | End: 2019-02-18 | Stop reason: SDUPTHER

## 2018-05-23 ENCOUNTER — LAB VISIT (OUTPATIENT)
Dept: LAB | Facility: HOSPITAL | Age: 73
End: 2018-05-23
Attending: INTERNAL MEDICINE
Payer: MEDICARE

## 2018-05-23 DIAGNOSIS — E11.42 CONTROLLED TYPE 2 DIABETES MELLITUS WITH DIABETIC POLYNEUROPATHY, WITHOUT LONG-TERM CURRENT USE OF INSULIN: ICD-10-CM

## 2018-05-23 LAB
ESTIMATED AVG GLUCOSE: 154 MG/DL
HBA1C MFR BLD HPLC: 7 %

## 2018-05-23 PROCEDURE — 36415 COLL VENOUS BLD VENIPUNCTURE: CPT | Mod: PO

## 2018-05-23 PROCEDURE — 83036 HEMOGLOBIN GLYCOSYLATED A1C: CPT

## 2018-05-29 PROBLEM — E11.69 HYPERLIPIDEMIA ASSOCIATED WITH TYPE 2 DIABETES MELLITUS: Status: ACTIVE | Noted: 2018-05-29

## 2018-05-29 PROBLEM — E78.5 HYPERLIPIDEMIA ASSOCIATED WITH TYPE 2 DIABETES MELLITUS: Status: ACTIVE | Noted: 2018-05-29

## 2018-05-29 NOTE — PROGRESS NOTES
"Subjective:      Patient ID: Natalia Mckeon is a 73 y.o. female.    Chief Complaint: Follow-up      HPI  Here for follow up of medical problems.  C/O dry cough for years.  Given antibiotic and prednisone course for 12 days didn't help it, given by ENT.  Last treated 1 month ago.  Has had multiple courses with no effect on cough.  Taking advair and flonase daily.  No f/c/sw.  Says lots postnasal drip and tickling.  Anxiety doing well on rx.  No cp/sob/palp.  BMs and urine normal.    AC breakfast sugars 112-150.    Updated/ annual due 11/18:  HM: 11/17 fluvax, 7/15 [id number], 2013 [id number] in [place], 6/14 TDaP, 10/14 zostervax at [facility], 6/17 mmg/Gyn Dr. Mariscal, bmd per her, 3/14 Cscope Dr. Auguste rep 4y, 2/16 Eye Dr. Chairez, 11/16 HCV neg.     Review of Systems   Constitutional: Negative for chills, diaphoresis and fever.   HENT: Positive for postnasal drip.    Respiratory: Positive for cough. Negative for shortness of breath.    Cardiovascular: Negative for chest pain, palpitations and leg swelling.   Gastrointestinal: Negative for blood in stool, constipation, diarrhea, nausea and vomiting.   Genitourinary: Negative for dysuria, frequency and hematuria.   Psychiatric/Behavioral: The patient is not nervous/anxious.          Objective:   /76 (BP Location: Right arm, Patient Position: Sitting, BP Method: Medium (Manual))   Pulse 77   Temp 97.6 °F (36.4 °C) (Tympanic)   Ht 5' 2" (1.575 m)   Wt 63.7 kg (140 lb 6.9 oz)   SpO2 97%   BMI 25.69 kg/m²     Physical Exam   Constitutional: She is oriented to person, place, and time. She appears well-developed.   HENT:   Right Ear: External ear normal. Tympanic membrane is not injected.   Left Ear: External ear normal. Tympanic membrane is not injected.   Mouth/Throat: Oropharynx is clear and moist.   Eyes: Conjunctivae are normal.   Neck: Neck supple. Carotid bruit is not present. No thyroid mass and no thyromegaly present.   Cardiovascular: Normal rate, regular " rhythm and intact distal pulses.  Exam reveals no gallop and no friction rub.    No murmur heard.  Pulmonary/Chest: Effort normal and breath sounds normal. She has no wheezes. She has no rales.   Abdominal: Soft. Bowel sounds are normal. She exhibits no mass. There is no hepatosplenomegaly. There is no tenderness.   Musculoskeletal: She exhibits no edema.   Lymphadenopathy:     She has no cervical adenopathy.   Neurological: She is alert and oriented to person, place, and time.   Psychiatric: She has a normal mood and affect.         Results for OTILIO PEOPLES (MRN [id number]) as of 5/29/2018 08:27   Ref. Range 11/22/2017 12:20 5/23/2018 10:00   Hemoglobin A1C Latest Ref Range: 4.0 - 5.6 % 6.6 (H) 7.0 (H)   Estimated Avg Glucose Latest Ref Range: 68 - 131 mg/dL 143 (H) 154 (H)     Assessment:       1. Controlled type 2 diabetes mellitus with diabetic polyneuropathy, without long-term current use of insulin    2. Hypertension associated with diabetes    3. Mild intermittent asthma without complication    4. Anxiety    5. Vitamin D deficiency    6. Hyperlipidemia associated with type 2 diabetes mellitus    7. Preventive measure    8. Cough          Plan:     Controlled type 2 diabetes mellitus with diabetic polyneuropathy, without long-term current use of insulin- gHb elevated due to recent steroids.    Hypertension associated with diabetes- stable on rx.    Mild intermittent asthma without complication- cont advair.  Will send to Pulm if cough doesn't resolve.    Anxiety- adeq control on rx.    Vitamin D deficiency    Hyperlipidemia associated with type 2 diabetes mellitus    Cough x years- add atrovent, poss gabapentin if not better.  -     ipratropium (ATROVENT) 0.03 % nasal spray; 2 sprays by Nasal route 2 (two) times daily.  Dispense: 30 mL; Refill: 4

## 2018-05-30 ENCOUNTER — OFFICE VISIT (OUTPATIENT)
Dept: INTERNAL MEDICINE | Facility: CLINIC | Age: 73
End: 2018-05-30
Payer: MEDICARE

## 2018-05-30 VITALS
DIASTOLIC BLOOD PRESSURE: 76 MMHG | WEIGHT: 140.44 LBS | HEIGHT: 62 IN | HEART RATE: 77 BPM | OXYGEN SATURATION: 97 % | BODY MASS INDEX: 25.84 KG/M2 | SYSTOLIC BLOOD PRESSURE: 132 MMHG | TEMPERATURE: 98 F

## 2018-05-30 DIAGNOSIS — E55.9 VITAMIN D DEFICIENCY: ICD-10-CM

## 2018-05-30 DIAGNOSIS — E78.5 HYPERLIPIDEMIA ASSOCIATED WITH TYPE 2 DIABETES MELLITUS: ICD-10-CM

## 2018-05-30 DIAGNOSIS — R05.9 COUGH: ICD-10-CM

## 2018-05-30 DIAGNOSIS — Z29.9 PREVENTIVE MEASURE: ICD-10-CM

## 2018-05-30 DIAGNOSIS — I15.2 HYPERTENSION ASSOCIATED WITH DIABETES: ICD-10-CM

## 2018-05-30 DIAGNOSIS — E11.69 HYPERLIPIDEMIA ASSOCIATED WITH TYPE 2 DIABETES MELLITUS: ICD-10-CM

## 2018-05-30 DIAGNOSIS — E11.59 HYPERTENSION ASSOCIATED WITH DIABETES: ICD-10-CM

## 2018-05-30 DIAGNOSIS — F41.9 ANXIETY: ICD-10-CM

## 2018-05-30 DIAGNOSIS — E11.42 CONTROLLED TYPE 2 DIABETES MELLITUS WITH DIABETIC POLYNEUROPATHY, WITHOUT LONG-TERM CURRENT USE OF INSULIN: Primary | ICD-10-CM

## 2018-05-30 DIAGNOSIS — J45.20 MILD INTERMITTENT ASTHMA WITHOUT COMPLICATION: ICD-10-CM

## 2018-05-30 PROCEDURE — 99214 OFFICE O/P EST MOD 30 MIN: CPT | Mod: S$PBB,,, | Performed by: INTERNAL MEDICINE

## 2018-05-30 PROCEDURE — 99999 PR PBB SHADOW E&M-EST. PATIENT-LVL III: CPT | Mod: PBBFAC,,, | Performed by: INTERNAL MEDICINE

## 2018-05-30 PROCEDURE — 99213 OFFICE O/P EST LOW 20 MIN: CPT | Mod: PBBFAC,PO | Performed by: INTERNAL MEDICINE

## 2018-05-30 RX ORDER — ROSUVASTATIN CALCIUM 20 MG/1
TABLET, COATED ORAL
COMMUNITY
End: 2018-11-08

## 2018-05-30 RX ORDER — CEPHALEXIN 250 MG/1
CAPSULE ORAL
COMMUNITY
End: 2018-05-30

## 2018-05-30 RX ORDER — KETOROLAC TROMETHAMINE 5 MG/ML
SOLUTION OPHTHALMIC
COMMUNITY

## 2018-05-30 RX ORDER — HYDROCODONE BITARTRATE AND ACETAMINOPHEN 10; 325 MG/1; MG/1
TABLET ORAL
COMMUNITY
End: 2018-11-08

## 2018-05-30 RX ORDER — LEVOCETIRIZINE DIHYDROCHLORIDE 5 MG/1
TABLET, FILM COATED ORAL
COMMUNITY

## 2018-05-30 RX ORDER — CODEINE PHOSPHATE AND GUAIFENESIN 10; 100 MG/5ML; MG/5ML
SOLUTION ORAL
COMMUNITY
End: 2019-06-05

## 2018-05-30 RX ORDER — TIZANIDINE 4 MG/1
TABLET ORAL
Refills: 0 | COMMUNITY
Start: 2018-04-25 | End: 2018-09-05

## 2018-05-30 RX ORDER — NAPROXEN SODIUM 550 MG/1
TABLET ORAL
COMMUNITY
End: 2018-05-30

## 2018-05-30 RX ORDER — NITROFURANTOIN 25; 75 MG/1; MG/1
CAPSULE ORAL
COMMUNITY
End: 2018-05-30 | Stop reason: ALTCHOICE

## 2018-05-30 RX ORDER — POTASSIUM CHLORIDE 1.5 G/1.58G
20 POWDER, FOR SOLUTION ORAL
COMMUNITY
Start: 2017-12-04 | End: 2018-09-05

## 2018-05-30 RX ORDER — CEFUROXIME AXETIL 250 MG/1
TABLET ORAL
COMMUNITY
End: 2018-05-30

## 2018-05-30 RX ORDER — MELOXICAM 15 MG/1
15 TABLET ORAL 2 TIMES DAILY WITH MEALS
Refills: 0 | COMMUNITY
Start: 2018-04-25 | End: 2018-11-08

## 2018-05-30 RX ORDER — TRAMADOL HYDROCHLORIDE 50 MG/1
TABLET ORAL
Refills: 0 | COMMUNITY
Start: 2018-04-25 | End: 2018-09-05

## 2018-05-30 RX ORDER — IPRATROPIUM BROMIDE 21 UG/1
2 SPRAY, METERED NASAL 2 TIMES DAILY
Qty: 30 ML | Refills: 4 | Status: SHIPPED | OUTPATIENT
Start: 2018-05-30

## 2018-05-30 RX ORDER — OLMESARTAN MEDOXOMIL / AMLODIPINE BESYLATE / HYDROCHLOROTHIAZIDE 40; 10; 12.5 MG/1; MG/1; MG/1
TABLET, FILM COATED ORAL
COMMUNITY
Start: 2018-05-08 | End: 2018-05-30 | Stop reason: SDUPTHER

## 2018-06-28 DIAGNOSIS — E03.9 ACQUIRED HYPOTHYROIDISM: ICD-10-CM

## 2018-06-29 RX ORDER — LEVOTHYROXINE SODIUM 25 UG/1
TABLET ORAL
Qty: 30 TABLET | Refills: 11 | Status: SHIPPED | OUTPATIENT
Start: 2018-06-29 | End: 2019-10-01 | Stop reason: SDUPTHER

## 2018-08-10 ENCOUNTER — TELEPHONE (OUTPATIENT)
Dept: INTERNAL MEDICINE | Facility: CLINIC | Age: 73
End: 2018-08-10

## 2018-08-10 NOTE — TELEPHONE ENCOUNTER
----- Message from Oneida Fallon sent at 8/10/2018 12:54 PM CDT -----  Contact: OTILIO YAÑEZ [phone]    PT IS HAVING SIDE PAIN AND HEARTBURN AND WOULD LIKE TO SEE IF SHE CAN BE WORKED IN NEXT WEEK

## 2018-08-23 ENCOUNTER — PATIENT OUTREACH (OUTPATIENT)
Dept: ADMINISTRATIVE | Facility: HOSPITAL | Age: 73
End: 2018-08-23

## 2018-08-23 NOTE — LETTER
August 23, 2018        Natalia MEDELLIN Mike  [address] [facility] [address]      Dear Mrs. Mckeon,    You have an upcoming appointment with Jen Satnos MD on 09/05/18    Your chart is indicating you may be due for the following and I will be happy to assist you in scheduling any needed appointments:   DEXA SCAN     Zoster Vaccine     Influenza Vaccine           If you have had any of the above done at another facility, please bring the records or information with you so that your record at [facility] will be complete.    We will be happy to assist you with scheduling any necessary appointments or you may contact the [facility] appointment desk at [phone] to schedule at your convenience.     Thank you for choosing [facility] for your healthcare needs,        Bobbye, LPN  Care Coordination Department  [facility]  [phone]

## 2018-08-23 NOTE — LETTER
August 23, 2018        Natalia MEDELLIN Mike  [address] [address]      Dear Mrs. Mckeon,    You have an upcoming appointment with Jen Santos MD on  09/05/18    Your chart is indicating you may be due for the following and I will be happy to assist you in scheduling any needed appointments:   DEXA SCAN     Zoster Vaccine     Influenza Vaccine           If you have had any of the above done at another facility, please bring the records or information with you so that your record at [facility] will be complete.    We will be happy to assist you with scheduling any necessary appointments or you may contact the [facility] appointment desk at [phone] to schedule at your convenience.     Thank you for choosing [facility] for your healthcare needs,        Bobbye, LPN  Care Coordination Department  [facility]  [phone]

## 2018-08-30 ENCOUNTER — TELEPHONE (OUTPATIENT)
Dept: INTERNAL MEDICINE | Facility: CLINIC | Age: 73
End: 2018-08-30

## 2018-08-30 DIAGNOSIS — E11.42 CONTROLLED TYPE 2 DIABETES MELLITUS WITH DIABETIC POLYNEUROPATHY, WITHOUT LONG-TERM CURRENT USE OF INSULIN: Primary | ICD-10-CM

## 2018-08-30 NOTE — TELEPHONE ENCOUNTER
----- Message from Corby Arrieta sent at 8/30/2018  2:50 PM CDT -----  Contact: Pt   Pt called to request orders for labs..[phone] (nfgh)

## 2018-08-31 ENCOUNTER — LAB VISIT (OUTPATIENT)
Dept: LAB | Facility: HOSPITAL | Age: 73
End: 2018-08-31
Attending: INTERNAL MEDICINE
Payer: MEDICARE

## 2018-08-31 DIAGNOSIS — E11.42 CONTROLLED TYPE 2 DIABETES MELLITUS WITH DIABETIC POLYNEUROPATHY, WITHOUT LONG-TERM CURRENT USE OF INSULIN: ICD-10-CM

## 2018-08-31 LAB
ESTIMATED AVG GLUCOSE: 151 MG/DL
HBA1C MFR BLD HPLC: 6.9 %

## 2018-08-31 PROCEDURE — 36415 COLL VENOUS BLD VENIPUNCTURE: CPT | Mod: PO

## 2018-08-31 PROCEDURE — 83036 HEMOGLOBIN GLYCOSYLATED A1C: CPT

## 2018-09-04 NOTE — PROGRESS NOTES
Subjective:      Patient ID: Natalia Mckeon is a 73 y.o. female.    Chief Complaint: Follow-up      HPI  Here for follow up of medical problems.  Cough is much better with addition of atrovent.  Asthma doing well.  Sugars good range.  Exercises and walking 3-4d per week.  No f/c/sw/cough.  No cp/sob/palp.  BMs normal with fiber and miralax twice a week.  Anxiety doing well, sleeping well.  Taking Ca and MVI.  BMD scheduled next week at [facility].    Updated/ annual due 11/18:  HM: 11/17 fluvax, 7/15 [id number], 2013 [id number] in [place], 6/14 TDaP, 10/14 zostervax at [facility], 6/17 mmg/Gyn Dr. Mariscal, bmd per [facility], 3/14 Cscope Dr. Auguste rep 4y, 3/18 Eye Dr. Phillip, 11/16 HCV neg.     Review of Systems   Constitutional: Negative for chills, diaphoresis and fever.   Respiratory: Negative for cough and shortness of breath.    Cardiovascular: Negative for chest pain, palpitations and leg swelling.   Gastrointestinal: Negative for blood in stool, constipation, diarrhea, nausea and vomiting.   Genitourinary: Negative for dysuria, frequency and hematuria.   Psychiatric/Behavioral: The patient is not nervous/anxious.          Objective:   /68 (BP Location: Right arm, Patient Position: Sitting, BP Method: Small (Manual))   Pulse 80   Temp 98.8 °F (37.1 °C) (Tympanic)   Wt 61.5 kg (135 lb 9.3 oz)   BMI 24.80 kg/m²     Physical Exam   Constitutional: She is oriented to person, place, and time. She appears well-developed.   HENT:   Mouth/Throat: Oropharynx is clear and moist.   Neck: Neck supple. Carotid bruit is not present. No thyroid mass present.   Cardiovascular: Normal rate, regular rhythm and intact distal pulses. Exam reveals no gallop and no friction rub.   No murmur heard.  Pulmonary/Chest: Effort normal and breath sounds normal. She has no wheezes. She has no rales.   Abdominal: Soft. Bowel sounds are normal. She exhibits no mass. There is no hepatosplenomegaly. There is no tenderness.   Musculoskeletal: She  exhibits no edema.   Lymphadenopathy:     She has no cervical adenopathy.   Neurological: She is alert and oriented to person, place, and time.   Psychiatric: She has a normal mood and affect.           Assessment:       1. Controlled type 2 diabetes mellitus with diabetic polyneuropathy, without long-term current use of insulin    2. Anxiety    3. Hypertension associated with diabetes    4. Mild intermittent asthma without complication    5. Preventive measure          Plan:     Controlled type 2 diabetes mellitus with diabetic polyneuropathy, without long-term current use of insulin- recheck 3mo.  -     Hemoglobin A1c; Future; Expected date: 09/05/2018  -     Microalbumin/creatinine urine ratio; Future; Expected date: 09/05/2018    Anxiety- adeq control, cont rx.    Hypertension associated with diabetes- doing well, cont rx.    Mild intermittent asthma without complication- doing well.    Preventive measure- will do in 3mo.  Discussed pt needs to get Shingrix vaccination at pharmacy.  -     CBC auto differential; Future; Expected date: 09/05/2018  -     Comprehensive metabolic panel; Future; Expected date: 09/05/2018  -     Lipid panel; Future; Expected date: 09/05/2018  -     TSH; Future; Expected date: 09/05/2018  -     Hemoglobin A1c; Future; Expected date: 09/05/2018  -     Microalbumin/creatinine urine ratio; Future; Expected date: 09/05/2018

## 2018-09-05 ENCOUNTER — OFFICE VISIT (OUTPATIENT)
Dept: INTERNAL MEDICINE | Facility: CLINIC | Age: 73
End: 2018-09-05
Payer: MEDICARE

## 2018-09-05 VITALS
BODY MASS INDEX: 24.8 KG/M2 | TEMPERATURE: 99 F | HEART RATE: 80 BPM | DIASTOLIC BLOOD PRESSURE: 68 MMHG | SYSTOLIC BLOOD PRESSURE: 128 MMHG | WEIGHT: 135.56 LBS

## 2018-09-05 DIAGNOSIS — Z29.9 PREVENTIVE MEASURE: ICD-10-CM

## 2018-09-05 DIAGNOSIS — E11.42 CONTROLLED TYPE 2 DIABETES MELLITUS WITH DIABETIC POLYNEUROPATHY, WITHOUT LONG-TERM CURRENT USE OF INSULIN: Primary | ICD-10-CM

## 2018-09-05 DIAGNOSIS — E11.59 HYPERTENSION ASSOCIATED WITH DIABETES: ICD-10-CM

## 2018-09-05 DIAGNOSIS — I15.2 HYPERTENSION ASSOCIATED WITH DIABETES: ICD-10-CM

## 2018-09-05 DIAGNOSIS — J45.20 MILD INTERMITTENT ASTHMA WITHOUT COMPLICATION: ICD-10-CM

## 2018-09-05 DIAGNOSIS — F41.9 ANXIETY: ICD-10-CM

## 2018-09-05 DIAGNOSIS — R10.13 DYSPEPSIA: ICD-10-CM

## 2018-09-05 PROCEDURE — 99214 OFFICE O/P EST MOD 30 MIN: CPT | Mod: S$PBB,,, | Performed by: INTERNAL MEDICINE

## 2018-09-05 PROCEDURE — 99999 PR PBB SHADOW E&M-EST. PATIENT-LVL II: CPT | Mod: PBBFAC,,, | Performed by: INTERNAL MEDICINE

## 2018-09-05 PROCEDURE — 99212 OFFICE O/P EST SF 10 MIN: CPT | Mod: PBBFAC,PO | Performed by: INTERNAL MEDICINE

## 2018-09-05 RX ORDER — POTASSIUM CHLORIDE 20 MEQ/1
20 TABLET, EXTENDED RELEASE ORAL DAILY
Qty: 90 TABLET | Refills: 3 | Status: SHIPPED | OUTPATIENT
Start: 2018-09-05 | End: 2019-10-01 | Stop reason: SDUPTHER

## 2018-09-05 RX ORDER — OMEPRAZOLE 40 MG/1
40 CAPSULE, DELAYED RELEASE ORAL DAILY
Qty: 90 CAPSULE | Refills: 3 | Status: SHIPPED | OUTPATIENT
Start: 2018-09-05 | End: 2019-09-26 | Stop reason: SDUPTHER

## 2018-10-16 ENCOUNTER — IMMUNIZATION (OUTPATIENT)
Dept: PHARMACY | Facility: CLINIC | Age: 73
End: 2018-10-16
Payer: MEDICARE

## 2018-10-16 RX ORDER — ROSUVASTATIN CALCIUM 10 MG/1
TABLET, COATED ORAL
Qty: 30 TABLET | Refills: 11 | Status: SHIPPED | OUTPATIENT
Start: 2018-10-16 | End: 2019-11-05 | Stop reason: SDUPTHER

## 2018-10-24 ENCOUNTER — PATIENT OUTREACH (OUTPATIENT)
Dept: ADMINISTRATIVE | Facility: HOSPITAL | Age: 73
End: 2018-10-24

## 2018-11-07 ENCOUNTER — TELEPHONE (OUTPATIENT)
Dept: OPHTHALMOLOGY | Facility: CLINIC | Age: 73
End: 2018-11-07

## 2018-11-07 NOTE — TELEPHONE ENCOUNTER
Spoke with pt.  She has had pain off and on for a few days OD.  She also has a sensation of pressure behind OD.  No vision changes, pain comes and goes.  MGANKITA has no openings, offered appt with another MD today.  She would like to be seen tomorrow, but Dr. Phillip is in surgery tomorrow.  Appt at 2:00 tomorrow with Dr. MAUREEN Douglas.

## 2018-11-08 ENCOUNTER — OFFICE VISIT (OUTPATIENT)
Dept: OPHTHALMOLOGY | Facility: CLINIC | Age: 73
End: 2018-11-08
Payer: MEDICARE

## 2018-11-08 DIAGNOSIS — H40.1131 PRIMARY OPEN ANGLE GLAUCOMA OF BOTH EYES, MILD STAGE: Primary | ICD-10-CM

## 2018-11-08 DIAGNOSIS — H04.123 DRY EYES, BILATERAL: ICD-10-CM

## 2018-11-08 PROCEDURE — 92012 INTRM OPH EXAM EST PATIENT: CPT | Mod: S$PBB,,, | Performed by: OPTOMETRIST

## 2018-11-08 PROCEDURE — 99213 OFFICE O/P EST LOW 20 MIN: CPT | Mod: PBBFAC | Performed by: OPTOMETRIST

## 2018-11-08 PROCEDURE — 99999 PR PBB SHADOW E&M-EST. PATIENT-LVL III: CPT | Mod: PBBFAC,,, | Performed by: OPTOMETRIST

## 2018-11-08 RX ORDER — LATANOPROST 50 UG/ML
1 SOLUTION/ DROPS OPHTHALMIC NIGHTLY
Qty: 2.5 ML | Refills: 11 | Status: SHIPPED | OUTPATIENT
Start: 2018-11-08 | End: 2019-11-20 | Stop reason: SDUPTHER

## 2018-11-08 NOTE — PATIENT INSTRUCTIONS
Primary open angle glaucoma of both eyes, mild stage  -     latanoprost (XALATAN) 0.005 % ophthalmic solution; Place 1 drop into both eyes every evening.  Dispense: 2.5 mL; Refill: 11     Dry eyes, bilateral        Start latanoprost eye drops, one drop in each eye at bedtime.     Start Refresh Optive for eye pain, stinging and burning.     RTC 6 weeks with Dr Phillip for IOP check, VF and gOCT

## 2018-11-08 NOTE — PROGRESS NOTES
HPI     Patient is having a sharp pain in right eye x 2 weeks off and on.  Patient is getting dizzy for a split second.   Patient wanted to get eye checked out, not use if it's a side effect from   medication. Using Acular bid OD only.  Last eye exam 03/13/2018 [facility]. POAG  Pain scale 6 when having pain.   New patient to [facility].    Last edited by Dio Romo, OD on 11/8/2018  2:39 PM. (History)            Assessment /Plan     For exam results, see Encounter Report.    Primary open angle glaucoma of both eyes, mild stage  -     latanoprost (XALATAN) 0.005 % ophthalmic solution; Place 1 drop into both eyes every evening.  Dispense: 2.5 mL; Refill: 11    Dry eyes, bilateral      Start latanoprost eye drops, one drop in each eye at bedtime.    Start Refresh Optive for eye pain, stinging and burning.    RTC 6 weeks with Dr Phillip for DFE, IOP check, VF and gOCT

## 2018-11-21 NOTE — PROGRESS NOTES
"Subjective:      Patient ID: Natalia Mckeon is a 73 y.o. female.    Chief Complaint: Annual Exam      HPI  Here for f/u medical problems and preventive exam.  Energy good.  Walking for exercise.  No f/c/sw.  Recent cough, getting better.  Not checking sugars.  Anxiety doing well, sleeping well.  No cp/sob/palp.  BMs good with miralax and fiber.  Urine normal.      HM: 10/18 fluvax, 7/15 [id number], 2013 [id number] in [place], 6/14 TDaP, 10/14 zostervax at [facility], 6/17 mmg/Gyn Dr. Mariscal, bmd per her, 3/14 Cscope Dr. Auguste rep 4y, 3/18 Eye Dr. Phillip, 11/16 HCV neg.     Review of Systems   Constitutional: Negative for appetite change, chills, diaphoresis and fever.   HENT: Negative for congestion, ear pain, rhinorrhea, sinus pressure and sore throat.    Respiratory: Negative for cough, chest tightness and shortness of breath.    Cardiovascular: Negative for chest pain and palpitations.   Gastrointestinal: Negative for blood in stool, constipation, diarrhea, nausea and vomiting.   Genitourinary: Negative for dysuria, frequency, hematuria, menstrual problem, urgency and vaginal discharge.   Musculoskeletal: Negative for arthralgias.   Skin: Negative for rash.   Neurological: Negative for dizziness and headaches.   Psychiatric/Behavioral: Negative for sleep disturbance. The patient is not nervous/anxious.          Objective:   /76 (BP Location: Right arm, Patient Position: Sitting, BP Method: Medium (Manual))   Pulse 86   Temp 98.6 °F (37 °C) (Tympanic)   Ht 5' 2" (1.575 m)   Wt 61.8 kg (136 lb 3.9 oz)   SpO2 97%   BMI 24.92 kg/m²     Physical Exam   Constitutional: She is oriented to person, place, and time. She appears well-developed and well-nourished.   HENT:   Right Ear: External ear normal. Tympanic membrane is not injected.   Left Ear: External ear normal. Tympanic membrane is not injected.   Mouth/Throat: Oropharynx is clear and moist.   Eyes: Conjunctivae are normal.   Neck: Normal range of motion. " Neck supple. No thyromegaly present.   Cardiovascular: Normal rate, regular rhythm and intact distal pulses. Exam reveals no gallop and no friction rub.   No murmur heard.  Pulses:       Dorsalis pedis pulses are 2+ on the right side, and 2+ on the left side.        Posterior tibial pulses are 2+ on the right side, and 2+ on the left side.   Pulmonary/Chest: Effort normal and breath sounds normal. She has no wheezes. She has no rales.   Abdominal: Soft. Bowel sounds are normal. She exhibits no mass. There is no tenderness.   Musculoskeletal: She exhibits no edema.   Feet:   Right Foot:   Protective Sensation: 10 sites tested. 10 sites sensed.   Skin Integrity: Negative for ulcer, blister, skin breakdown, erythema, warmth, callus or dry skin.   Left Foot:   Protective Sensation: 10 sites tested. 10 sites sensed.   Skin Integrity: Negative for ulcer, blister, skin breakdown, erythema, warmth, callus or dry skin.   Lymphadenopathy:     She has no cervical adenopathy.   Neurological: She is alert and oriented to person, place, and time.   Skin: Skin is warm. No rash noted.   Psychiatric: She has a normal mood and affect.     Results for OTILIO PEOPLES (MRN [id number]) as of 12/5/2018 14:30   Ref. Range 11/28/2018 08:20 11/28/2018 08:23   WBC Latest Ref Range: 3.90 - 12.70 K/uL  5.07   RBC Latest Ref Range: 4.00 - 5.40 M/uL  4.57   Hemoglobin Latest Ref Range: 12.0 - 16.0 g/dL  12.1   Hematocrit Latest Ref Range: 37.0 - 48.5 %  37.7   MCV Latest Ref Range: 82 - 98 fL  83   MCH Latest Ref Range: 27.0 - 31.0 pg  26.5 (L)   MCHC Latest Ref Range: 32.0 - 36.0 g/dL  32.1   RDW Latest Ref Range: 11.5 - 14.5 %  14.5   Platelets Latest Ref Range: 150 - 350 K/uL  181   MPV Latest Ref Range: 9.2 - 12.9 fL  11.7   Gran% Latest Ref Range: 38.0 - 73.0 %  58.8   Gran # (ANC) Latest Ref Range: 1.8 - 7.7 K/uL  3.0   Immature Granulocytes Latest Ref Range: 0.0 - 0.5 %  0.2   Immature Grans (Abs) Latest Ref Range: 0.00 - 0.04 K/uL  0.01    Lymph% Latest Ref Range: 18.0 - 48.0 %  32.5   Lymph # Latest Ref Range: 1.0 - 4.8 K/uL  1.7   Mono% Latest Ref Range: 4.0 - 15.0 %  6.3   Mono # Latest Ref Range: 0.3 - 1.0 K/uL  0.3   Eosinophil% Latest Ref Range: 0.0 - 8.0 %  1.8   Eos # Latest Ref Range: 0.0 - 0.5 K/uL  0.1   Basophil% Latest Ref Range: 0.0 - 1.9 %  0.4   Baso # Latest Ref Range: 0.00 - 0.20 K/uL  0.02   nRBC Latest Ref Range: 0 /100 WBC  0   Differential Method Unknown  Automated   Sodium Latest Ref Range: 136 - 145 mmol/L  139   Potassium Latest Ref Range: 3.5 - 5.1 mmol/L  4.5   Chloride Latest Ref Range: 95 - 110 mmol/L  103   CO2 Latest Ref Range: 23 - 29 mmol/L  30 (H)   Anion Gap Latest Ref Range: 8 - 16 mmol/L  6 (L)   BUN, Bld Latest Ref Range: 8 - 23 mg/dL  16   Creatinine Latest Ref Range: 0.5 - 1.4 mg/dL  1.1   eGFR if non African American Latest Ref Range: >60 mL/min/1.73 m^2  49.9 (A)   eGFR if African American Latest Ref Range: >60 mL/min/1.73 m^2  57.6 (A)   Glucose Latest Ref Range: 70 - 110 mg/dL  130 (H)   Calcium Latest Ref Range: 8.7 - 10.5 mg/dL  9.6   Alkaline Phosphatase Latest Ref Range: 55 - 135 U/L  79   Total Protein Latest Ref Range: 6.0 - 8.4 g/dL  7.3   Albumin Latest Ref Range: 3.5 - 5.2 g/dL  3.8   Total Bilirubin Latest Ref Range: 0.1 - 1.0 mg/dL  0.6   AST Latest Ref Range: 10 - 40 U/L  32   ALT Latest Ref Range: 10 - 44 U/L  27   Triglycerides Latest Ref Range: 30 - 150 mg/dL  82   Cholesterol Latest Ref Range: 120 - 199 mg/dL  152   HDL Latest Ref Range: 40 - 75 mg/dL  62   HDL/Chol Ratio Latest Ref Range: 20.0 - 50.0 %  40.8   LDL Cholesterol Latest Ref Range: 63.0 - 159.0 mg/dL  73.6   Non-HDL Cholesterol Latest Units: mg/dL  90   Total Cholesterol/HDL Ratio Latest Ref Range: 2.0 - 5.0   2.5   Hemoglobin A1C Latest Ref Range: 4.0 - 5.6 %  6.8 (H)   Estimated Avg Glucose Latest Ref Range: 68 - 131 mg/dL  148 (H)   TSH Latest Ref Range: 0.400 - 4.000 uIU/mL  2.836   Microalbum.,U,Random Latest Units: ug/mL  6.0    Creatinine, Random Ur Latest Ref Range: 15.0 - 325.0 mg/dL 70.0    Microalb Creat Ratio Latest Ref Range: 0.0 - 30.0 ug/mg 8.6          Assessment:       1. Controlled type 2 diabetes mellitus with diabetic polyneuropathy, without long-term current use of insulin    2. Anxiety    3. Hypertension associated with diabetes    4. Hyperlipidemia associated with type 2 diabetes mellitus    5. Mild intermittent asthma without complication    6. Vitamin D deficiency    7. Encounter for preventive health examination          Plan:     Controlled type 2 diabetes mellitus with diabetic polyneuropathy, without long-term current use of insulin- new meter.  RTC 6mo with lab.  -     blood sugar diagnostic, drum (ACCU-CHEK COMPACT PLUS TEST) Strp; TEST THREE TIMES A DAY  Dispense: 102 strip; Refill: 0  -     Hemoglobin A1c; Future; Expected date: 12/05/2018    Anxiety, doing well with zoloft.    Hypertension associated with diabetes- stable, cont rx.    Hyperlipidemia associated with type 2 diabetes mellitus- cont statin.    Mild intermittent asthma without complication- doing well, cont rx.    Vitamin D deficiency    Encounter for preventive health examination- Discussed pt needs to get Shingrix vaccination at pharmacy.  Otherwise utd.

## 2018-11-28 ENCOUNTER — LAB VISIT (OUTPATIENT)
Dept: LAB | Facility: HOSPITAL | Age: 73
End: 2018-11-28
Attending: INTERNAL MEDICINE
Payer: MEDICARE

## 2018-11-28 DIAGNOSIS — I15.2 HYPERTENSION ASSOCIATED WITH DIABETES: ICD-10-CM

## 2018-11-28 DIAGNOSIS — E11.59 HYPERTENSION ASSOCIATED WITH DIABETES: ICD-10-CM

## 2018-11-28 DIAGNOSIS — Z29.9 PREVENTIVE MEASURE: ICD-10-CM

## 2018-11-28 DIAGNOSIS — E11.42 CONTROLLED TYPE 2 DIABETES MELLITUS WITH DIABETIC POLYNEUROPATHY, WITHOUT LONG-TERM CURRENT USE OF INSULIN: ICD-10-CM

## 2018-11-28 LAB
ALBUMIN SERPL BCP-MCNC: 3.8 G/DL
ALP SERPL-CCNC: 79 U/L
ALT SERPL W/O P-5'-P-CCNC: 27 U/L
ANION GAP SERPL CALC-SCNC: 6 MMOL/L
AST SERPL-CCNC: 32 U/L
BASOPHILS # BLD AUTO: 0.02 K/UL
BASOPHILS NFR BLD: 0.4 %
BILIRUB SERPL-MCNC: 0.6 MG/DL
BUN SERPL-MCNC: 16 MG/DL
CALCIUM SERPL-MCNC: 9.6 MG/DL
CHLORIDE SERPL-SCNC: 103 MMOL/L
CHOLEST SERPL-MCNC: 152 MG/DL
CHOLEST/HDLC SERPL: 2.5 {RATIO}
CO2 SERPL-SCNC: 30 MMOL/L
CREAT SERPL-MCNC: 1.1 MG/DL
DIFFERENTIAL METHOD: ABNORMAL
EOSINOPHIL # BLD AUTO: 0.1 K/UL
EOSINOPHIL NFR BLD: 1.8 %
ERYTHROCYTE [DISTWIDTH] IN BLOOD BY AUTOMATED COUNT: 14.5 %
EST. GFR  (AFRICAN AMERICAN): 57.6 ML/MIN/1.73 M^2
EST. GFR  (NON AFRICAN AMERICAN): 49.9 ML/MIN/1.73 M^2
ESTIMATED AVG GLUCOSE: 148 MG/DL
GLUCOSE SERPL-MCNC: 130 MG/DL
HBA1C MFR BLD HPLC: 6.8 %
HCT VFR BLD AUTO: 37.7 %
HDLC SERPL-MCNC: 62 MG/DL
HDLC SERPL: 40.8 %
HGB BLD-MCNC: 12.1 G/DL
IMM GRANULOCYTES # BLD AUTO: 0.01 K/UL
IMM GRANULOCYTES NFR BLD AUTO: 0.2 %
LDLC SERPL CALC-MCNC: 73.6 MG/DL
LYMPHOCYTES # BLD AUTO: 1.7 K/UL
LYMPHOCYTES NFR BLD: 32.5 %
MCH RBC QN AUTO: 26.5 PG
MCHC RBC AUTO-ENTMCNC: 32.1 G/DL
MCV RBC AUTO: 83 FL
MONOCYTES # BLD AUTO: 0.3 K/UL
MONOCYTES NFR BLD: 6.3 %
NEUTROPHILS # BLD AUTO: 3 K/UL
NEUTROPHILS NFR BLD: 58.8 %
NONHDLC SERPL-MCNC: 90 MG/DL
NRBC BLD-RTO: 0 /100 WBC
PLATELET # BLD AUTO: 181 K/UL
PMV BLD AUTO: 11.7 FL
POTASSIUM SERPL-SCNC: 4.5 MMOL/L
PROT SERPL-MCNC: 7.3 G/DL
RBC # BLD AUTO: 4.57 M/UL
SODIUM SERPL-SCNC: 139 MMOL/L
TRIGL SERPL-MCNC: 82 MG/DL
TSH SERPL DL<=0.005 MIU/L-ACNC: 2.84 UIU/ML
WBC # BLD AUTO: 5.07 K/UL

## 2018-11-28 PROCEDURE — 80061 LIPID PANEL: CPT

## 2018-11-28 PROCEDURE — 84443 ASSAY THYROID STIM HORMONE: CPT

## 2018-11-28 PROCEDURE — 85025 COMPLETE CBC W/AUTO DIFF WBC: CPT

## 2018-11-28 PROCEDURE — 83036 HEMOGLOBIN GLYCOSYLATED A1C: CPT

## 2018-11-28 PROCEDURE — 80053 COMPREHEN METABOLIC PANEL: CPT

## 2018-11-28 PROCEDURE — 36415 COLL VENOUS BLD VENIPUNCTURE: CPT | Mod: PO

## 2018-12-05 ENCOUNTER — OFFICE VISIT (OUTPATIENT)
Dept: INTERNAL MEDICINE | Facility: CLINIC | Age: 73
End: 2018-12-05
Payer: MEDICARE

## 2018-12-05 VITALS
HEIGHT: 62 IN | DIASTOLIC BLOOD PRESSURE: 76 MMHG | BODY MASS INDEX: 25.07 KG/M2 | HEART RATE: 86 BPM | TEMPERATURE: 99 F | OXYGEN SATURATION: 97 % | SYSTOLIC BLOOD PRESSURE: 132 MMHG | WEIGHT: 136.25 LBS

## 2018-12-05 DIAGNOSIS — E11.42 CONTROLLED TYPE 2 DIABETES MELLITUS WITH DIABETIC POLYNEUROPATHY, WITHOUT LONG-TERM CURRENT USE OF INSULIN: Primary | ICD-10-CM

## 2018-12-05 DIAGNOSIS — E78.5 HYPERLIPIDEMIA ASSOCIATED WITH TYPE 2 DIABETES MELLITUS: ICD-10-CM

## 2018-12-05 DIAGNOSIS — F41.9 ANXIETY: ICD-10-CM

## 2018-12-05 DIAGNOSIS — E11.59 HYPERTENSION ASSOCIATED WITH DIABETES: ICD-10-CM

## 2018-12-05 DIAGNOSIS — I15.2 HYPERTENSION ASSOCIATED WITH DIABETES: ICD-10-CM

## 2018-12-05 DIAGNOSIS — E55.9 VITAMIN D DEFICIENCY: ICD-10-CM

## 2018-12-05 DIAGNOSIS — Z00.00 ENCOUNTER FOR PREVENTIVE HEALTH EXAMINATION: ICD-10-CM

## 2018-12-05 DIAGNOSIS — J45.20 MILD INTERMITTENT ASTHMA WITHOUT COMPLICATION: ICD-10-CM

## 2018-12-05 DIAGNOSIS — E11.69 HYPERLIPIDEMIA ASSOCIATED WITH TYPE 2 DIABETES MELLITUS: ICD-10-CM

## 2018-12-05 PROCEDURE — 99214 OFFICE O/P EST MOD 30 MIN: CPT | Mod: S$PBB,,, | Performed by: INTERNAL MEDICINE

## 2018-12-05 PROCEDURE — 99999 PR PBB SHADOW E&M-EST. PATIENT-LVL III: CPT | Mod: PBBFAC,,, | Performed by: INTERNAL MEDICINE

## 2018-12-05 PROCEDURE — 99213 OFFICE O/P EST LOW 20 MIN: CPT | Mod: PBBFAC,PO | Performed by: INTERNAL MEDICINE

## 2018-12-05 RX ORDER — INSULIN PUMP SYRINGE, 3 ML
EACH MISCELLANEOUS
Qty: 1 EACH | Refills: 0 | Status: SHIPPED | OUTPATIENT
Start: 2018-12-05 | End: 2019-06-05

## 2018-12-05 RX ORDER — LANCETS
EACH MISCELLANEOUS
Qty: 100 EACH | Refills: 3 | Status: SHIPPED | OUTPATIENT
Start: 2018-12-05 | End: 2019-06-05

## 2018-12-19 ENCOUNTER — APPOINTMENT (OUTPATIENT)
Dept: OPHTHALMOLOGY | Facility: CLINIC | Age: 73
End: 2018-12-19
Payer: MEDICARE

## 2018-12-19 ENCOUNTER — OFFICE VISIT (OUTPATIENT)
Dept: OPHTHALMOLOGY | Facility: CLINIC | Age: 73
End: 2018-12-19
Payer: MEDICARE

## 2018-12-19 DIAGNOSIS — H04.123 DRY EYES, BILATERAL: ICD-10-CM

## 2018-12-19 DIAGNOSIS — Z83.511 FAMILY HISTORY OF OPEN-ANGLE GLAUCOMA: ICD-10-CM

## 2018-12-19 DIAGNOSIS — H40.1131 PRIMARY OPEN ANGLE GLAUCOMA OF BOTH EYES, MILD STAGE: Primary | ICD-10-CM

## 2018-12-19 DIAGNOSIS — E11.9 TYPE 2 DIABETES MELLITUS WITHOUT COMPLICATION, WITHOUT LONG-TERM CURRENT USE OF INSULIN: ICD-10-CM

## 2018-12-19 PROCEDURE — 92020 GONIOSCOPY: CPT | Mod: PBBFAC,PO | Performed by: OPHTHALMOLOGY

## 2018-12-19 PROCEDURE — 99999 PR PBB SHADOW E&M-EST. PATIENT-LVL II: CPT | Mod: PBBFAC,,, | Performed by: OPHTHALMOLOGY

## 2018-12-19 PROCEDURE — 99212 OFFICE O/P EST SF 10 MIN: CPT | Mod: PBBFAC,PO,25 | Performed by: OPHTHALMOLOGY

## 2018-12-19 PROCEDURE — 92020 GONIOSCOPY: CPT | Mod: S$PBB,,, | Performed by: OPHTHALMOLOGY

## 2018-12-19 PROCEDURE — 92014 COMPRE OPH EXAM EST PT 1/>: CPT | Mod: S$PBB,,, | Performed by: OPHTHALMOLOGY

## 2018-12-19 PROCEDURE — 92133 CPTRZD OPH DX IMG PST SGM ON: CPT | Mod: PBBFAC,PO | Performed by: OPHTHALMOLOGY

## 2018-12-19 RX ORDER — TIMOLOL MALEATE 5 MG/ML
1 SOLUTION/ DROPS OPHTHALMIC 2 TIMES DAILY
Qty: 10 ML | Refills: 6 | Status: SHIPPED | OUTPATIENT
Start: 2018-12-19 | End: 2019-11-20 | Stop reason: SDUPTHER

## 2018-12-19 NOTE — PROGRESS NOTES
HPI     Patient was an hour late for her hvf so it will have to be rescheduled   patient is here for iop check and goct., patient saw TRF  On 11/08/18 and   was given an rx for Latanoprost and never picked up, patient was scheduled   for SLT OU in March 2018 and cancelled stating she would try the drops   instead she  is still using Ket QID OU since March when she was scheduled   for SLT OU.               LAST SEEN 11/08/18  DM SINCE 2006  BDR  DME OD  VMA  COAG (NEWLY DX 3/2018)  +FAMHX-GLAUCOMA-BROTHER      OU KET QID        DM SINCE 2006  BDR  DME OD  VMA  COAG (NEWLY DX 3/2018)  +FAMHX-GLAUCOMA-BROTHER      OU KET BID    Last edited by Jaspreet Phillip MD on 12/19/2018  3:45 PM. (History)            Assessment /Plan     For exam results, see Encounter Report.      ICD-10-CM ICD-9-CM    1. Primary open angle glaucoma of both eyes, mild stage H40.1131 365.11 Posterior Segment OCT Optic Nerve- Both eyes with findings consistent with glaucoma despite relatively benign appearing optic nerve heads  Start Timolol bid OU     365.71    2. Family history of open-angle glaucoma Z83.511 V19.11 Brother had surgery   3. Dry eyes, bilateral H04.123 375.15 Systane Ultra 3-4 times per day of Clear eyes GA   4. Type 2 diabetes mellitus without complication, without long-term current use of insulin E11.9 250.00 No retinopathy or edema on mOCT today, history of ME OD now resolved.    5.      Cortical Cats       Follow for now    Timolol bid OU  Return to clinic with hvf in 6 weeks

## 2019-01-03 ENCOUNTER — PES CALL (OUTPATIENT)
Dept: ADMINISTRATIVE | Facility: CLINIC | Age: 74
End: 2019-01-03

## 2019-02-01 ENCOUNTER — APPOINTMENT (OUTPATIENT)
Dept: OPHTHALMOLOGY | Facility: CLINIC | Age: 74
End: 2019-02-01
Payer: MEDICARE

## 2019-02-01 ENCOUNTER — OFFICE VISIT (OUTPATIENT)
Dept: OPHTHALMOLOGY | Facility: CLINIC | Age: 74
End: 2019-02-01
Payer: MEDICARE

## 2019-02-01 DIAGNOSIS — E11.9 TYPE 2 DIABETES MELLITUS WITHOUT COMPLICATION, WITHOUT LONG-TERM CURRENT USE OF INSULIN: ICD-10-CM

## 2019-02-01 DIAGNOSIS — H04.123 DRY EYES, BILATERAL: ICD-10-CM

## 2019-02-01 DIAGNOSIS — H40.1131 PRIMARY OPEN ANGLE GLAUCOMA OF BOTH EYES, MILD STAGE: Primary | ICD-10-CM

## 2019-02-01 DIAGNOSIS — Z83.511 FAMILY HISTORY OF OPEN-ANGLE GLAUCOMA: ICD-10-CM

## 2019-02-01 PROCEDURE — 92083 EXTENDED VISUAL FIELD XM: CPT | Mod: PBBFAC,PN | Performed by: OPHTHALMOLOGY

## 2019-02-01 PROCEDURE — 92012 PR EYE EXAM, EST PATIENT,INTERMED: ICD-10-PCS | Mod: S$PBB,,, | Performed by: OPHTHALMOLOGY

## 2019-02-01 PROCEDURE — 92083 HUMPHREY VISUAL FIELD - OU - BOTH EYES: ICD-10-PCS | Mod: 26,S$PBB,, | Performed by: OPHTHALMOLOGY

## 2019-02-01 PROCEDURE — 99999 PR PBB SHADOW E&M-EST. PATIENT-LVL II: ICD-10-PCS | Mod: PBBFAC,,, | Performed by: OPHTHALMOLOGY

## 2019-02-01 PROCEDURE — 99999 PR PBB SHADOW E&M-EST. PATIENT-LVL II: CPT | Mod: PBBFAC,,, | Performed by: OPHTHALMOLOGY

## 2019-02-01 PROCEDURE — 92012 INTRM OPH EXAM EST PATIENT: CPT | Mod: S$PBB,,, | Performed by: OPHTHALMOLOGY

## 2019-02-01 PROCEDURE — 99212 OFFICE O/P EST SF 10 MIN: CPT | Mod: PBBFAC,PN | Performed by: OPHTHALMOLOGY

## 2019-02-01 NOTE — PROGRESS NOTES
HPI     Glaucoma      Additional comments: LATANOPROST BID OU,               Comments     Patient returns for a hvf review, and iop check, patient states the Ket   is one of her main drops and she uses at least bid sometimes tid ou, and   Latanoprost at least bid ou, and does not use Timolol at all  She states   this new schedule was given her on last visit..        DM SINCE 2006  BDR  H/o DME OD  VMA  COAG (NEWLY DX 3/2018)  +FAMHX-GLAUCOMA-BROTHER      OU KET BID -TID, LATANOPROST BID , TIMOLOL ( DOES NOT USE )          Last edited by Jaspreet Phillip MD on 2/1/2019  4:10 PM. (History)            Assessment /Plan     For exam results, see Encounter Report.      ICD-10-CM ICD-9-CM    1. Primary open angle glaucoma of both eyes, mild stage H40.1131 365.11 Not using meds correctly  iop slightly above target     365.71    2. Family history of open-angle glaucoma Z83.511 V19.11    3. Dry eyes, bilateral H04.123 375.15    4. Type 2 diabetes mellitus without complication, without long-term current use of insulin E11.9 250.00        D/c Keto  Timolol bid OU  Latanoprost qd OU    Return to clinic 2 months

## 2019-02-07 ENCOUNTER — OFFICE VISIT (OUTPATIENT)
Dept: INTERNAL MEDICINE | Facility: CLINIC | Age: 74
End: 2019-02-07
Payer: MEDICARE

## 2019-02-07 VITALS
DIASTOLIC BLOOD PRESSURE: 78 MMHG | HEIGHT: 62 IN | TEMPERATURE: 99 F | WEIGHT: 136.44 LBS | HEART RATE: 67 BPM | BODY MASS INDEX: 25.11 KG/M2 | SYSTOLIC BLOOD PRESSURE: 130 MMHG | OXYGEN SATURATION: 95 %

## 2019-02-07 DIAGNOSIS — Z00.00 ENCOUNTER FOR PREVENTIVE HEALTH EXAMINATION: Primary | ICD-10-CM

## 2019-02-07 DIAGNOSIS — E11.59 HYPERTENSION ASSOCIATED WITH DIABETES: ICD-10-CM

## 2019-02-07 DIAGNOSIS — E11.42 CONTROLLED TYPE 2 DIABETES MELLITUS WITH DIABETIC POLYNEUROPATHY, WITHOUT LONG-TERM CURRENT USE OF INSULIN: ICD-10-CM

## 2019-02-07 DIAGNOSIS — Z91.89 POTENTIAL FOR COGNITIVE IMPAIRMENT: ICD-10-CM

## 2019-02-07 DIAGNOSIS — Z86.010 HISTORY OF COLON POLYPS: ICD-10-CM

## 2019-02-07 DIAGNOSIS — I15.2 HYPERTENSION ASSOCIATED WITH DIABETES: ICD-10-CM

## 2019-02-07 DIAGNOSIS — J45.20 MILD INTERMITTENT ASTHMA WITHOUT COMPLICATION: ICD-10-CM

## 2019-02-07 DIAGNOSIS — E55.9 VITAMIN D DEFICIENCY: ICD-10-CM

## 2019-02-07 DIAGNOSIS — E11.69 HYPERLIPIDEMIA ASSOCIATED WITH TYPE 2 DIABETES MELLITUS: ICD-10-CM

## 2019-02-07 DIAGNOSIS — H40.1131 PRIMARY OPEN ANGLE GLAUCOMA OF BOTH EYES, MILD STAGE: ICD-10-CM

## 2019-02-07 DIAGNOSIS — F41.9 ANXIETY: ICD-10-CM

## 2019-02-07 DIAGNOSIS — E78.5 HYPERLIPIDEMIA ASSOCIATED WITH TYPE 2 DIABETES MELLITUS: ICD-10-CM

## 2019-02-07 DIAGNOSIS — E03.9 ACQUIRED HYPOTHYROIDISM: ICD-10-CM

## 2019-02-07 PROCEDURE — 99999 PR PBB SHADOW E&M-EST. PATIENT-LVL III: CPT | Mod: PBBFAC,,, | Performed by: PHYSICIAN ASSISTANT

## 2019-02-07 PROCEDURE — G0439 PPPS, SUBSEQ VISIT: HCPCS | Mod: S$GLB,,, | Performed by: PHYSICIAN ASSISTANT

## 2019-02-07 PROCEDURE — 99999 PR PBB SHADOW E&M-EST. PATIENT-LVL III: ICD-10-PCS | Mod: PBBFAC,,, | Performed by: PHYSICIAN ASSISTANT

## 2019-02-07 PROCEDURE — G0439 PR MEDICARE ANNUAL WELLNESS SUBSEQUENT VISIT: ICD-10-PCS | Mod: S$GLB,,, | Performed by: PHYSICIAN ASSISTANT

## 2019-02-07 PROCEDURE — 99213 OFFICE O/P EST LOW 20 MIN: CPT | Mod: PBBFAC,PN | Performed by: PHYSICIAN ASSISTANT

## 2019-02-07 RX ORDER — CEFDINIR 300 MG/1
CAPSULE ORAL
Refills: 0 | COMMUNITY
Start: 2019-02-04 | End: 2019-06-05 | Stop reason: ALTCHOICE

## 2019-02-07 RX ORDER — SITAGLIPTIN 50 MG/1
TABLET, FILM COATED ORAL
Refills: 2 | COMMUNITY
Start: 2019-02-04 | End: 2019-06-05

## 2019-02-07 NOTE — PATIENT INSTRUCTIONS
Counseling and Referral of Other Preventative  (Italic type indicates deductible and co-insurance are waived)    Patient Name: Natalia Mckeon  Today's Date: 2/7/2019    Health Maintenance       Date Due Completion Date    DEXA SCAN 01/21/1985 ---    Zoster Vaccine 01/21/2005 ---    Hemoglobin A1c 05/28/2019 11/28/2018    Lipid Panel 11/28/2019 11/28/2018    Urine Microalbumin 11/28/2019 11/28/2018    Foot Exam 12/05/2019 12/5/2018    Override on 10/12/2015: Done (Moderate decreased sensation to vibratory sense bilateral feet.  Skin intact.  No onychomycosis.)    Low Dose Statin 02/01/2020 2/1/2019    Eye Exam 02/01/2020 2/1/2019    Override on 12/19/2018: Done    Override on 7/20/2015: Done    Override on 7/20/2015: Done    Mammogram 09/10/2020 9/10/2018    Override on 6/4/2009: Done    TETANUS VACCINE 06/25/2024 6/25/2014    Colonoscopy 08/02/2028 8/2/2018    Override on 8/2/2018: Done ( COLONOSCOPY 8/2/2018 ABNORMAL [facility], per Care Everywhere)    Override on 7/2/2010: Done ([facility]; no repeat date indicated.)        No orders of the defined types were placed in this encounter.    The following information is provided to all patients.  This information is to help you find resources for any of the problems found today that may be affecting your health:                Living healthy guide: www.[facility].[place].gov      Understanding Diabetes: www.diabetes.org      Eating healthy: www.cdc.gov/healthyweight      CDC home safety checklist: www.cdc.gov/steadi/patient.html      Agency on Aging: www.goea.[place].gov      Alcoholics anonymous (AA): www.aa.org      Physical Activity: www.elicia.nih.gov/sn6dltv      Tobacco use: www.quitwithusla.org

## 2019-02-07 NOTE — PROGRESS NOTES
"Natalia Mckeon presented for a  Medicare AWV and comprehensive Health Risk Assessment today. The following components were reviewed and updated:    · Medical history  · Family History  · Social history  · Allergies and Current Medications  · Health Risk Assessment  · Health Maintenance  · Care Team     ** See Completed Assessments for Annual Wellness Visit within the encounter summary.**       The following assessments were completed:  · Living Situation  · CAGE  · Depression Screening  · Timed Get Up and Go  · Whisper Test  · Cognitive Function Screening  · Nutrition Screening  · ADL Screening  · PAQ Screening    Vitals:    02/07/19 1126   BP: 130/78   BP Location: Left arm   Patient Position: Sitting   BP Method: Small (Manual)   Pulse: 67   Temp: 98.6 °F (37 °C)   TempSrc: Tympanic   SpO2: 95%   Weight: 61.9 kg (136 lb 7.4 oz)   Height: 5' 2" (1.575 m)     Body mass index is 24.96 kg/m².     Physical Exam   Constitutional: She is oriented to person, place, and time. She appears well-developed and well-nourished. No distress.   HENT:   Head: Normocephalic and atraumatic.   Eyes: EOM are normal. No scleral icterus.   Neck: Neck supple.   Cardiovascular: Normal rate and regular rhythm.   Pulmonary/Chest: Effort normal and breath sounds normal. No stridor. No respiratory distress. She has no decreased breath sounds. She has no wheezes. She has no rhonchi. She has no rales.   Musculoskeletal: Normal range of motion. She exhibits no edema.   Neurological: She is alert and oriented to person, place, and time. No cranial nerve deficit.   Skin: Skin is warm and dry. No rash noted.   Psychiatric: She has a normal mood and affect. Her speech is normal and behavior is normal. Thought content normal.         Diagnoses and health risks identified today and associated recommendations/orders:    1. Encounter for preventive health examination    2. Hypertension associated with diabetes  BP controlled. Pt taking Tribenzor. Continue " current treatment plan as prescribed by your PCP and f/u with your PCP for further management.    3. Hyperlipidemia associated with type 2 diabetes mellitus  Controlled. Pt taking Crestor and aspirin. Continue current treatment plan as prescribed by your PCP and f/u with your PCP for further management.  Component      Latest Ref Rng & Units 11/28/2018 11/22/2017   Cholesterol      120 - 199 mg/dL 152 156   Triglycerides      30 - 150 mg/dL 82 58   HDL      40 - 75 mg/dL 62 70   LDL Cholesterol      63.0 - 159.0 mg/dL 73.6 74.4   HDL/Chol Ratio      20.0 - 50.0 % 40.8 44.9   Total Cholesterol/HDL Ratio      2.0 - 5.0 2.5 2.2   Non-HDL Cholesterol      mg/dL 90 86     4. Controlled type 2 diabetes mellitus with diabetic polyneuropathy, without long-term current use of insulin  DM controlled. Pt taking metformin. Monitor glucose, check feet daily, and stay UTD with eye doctor. Continue current treatment plan as prescribed by your PCP and f/u with your PCP for further management.  Component      Latest Ref Rng & Units 11/28/2018   Hemoglobin A1C External      4.0 - 5.6 % 6.8 (H)   Estimated Avg Glucose      68 - 131 mg/dL 148 (H)     5. Primary open angle glaucoma of both eyes, mild stage  Pt using latanoprost and timolol eyedrops. Continue current treatment plan as prescribed by your eye doctor and f/u with him for further management.    6. Mild intermittent asthma without complication  Stable. Pt using Advair. Continue current treatment plan as prescribed by your PCP and f/u with your PCP for further management.    7. Vitamin D deficiency  Controlled. Continue current treatment plan as prescribed by your PCP and f/u with your PCP for further management.  Component      Latest Ref Rng & Units 11/22/2017   Vit D, 25-Hydroxy      30 - 96 ng/mL 43     8. Anxiety  Stable. Pt taking Zoloft. Continue current treatment plan as prescribed by your PCP and f/u with your PCP for further management.    9. History of colon  polyps  Most recent colonoscopy 8/2018. Continue current treatment plan as prescribed by your PCP and GI physician and f/u with them for further management.    10. Acquired hypothyroidism  Controlled. Pt taking Synthroid. Continue current treatment plan as prescribed by your PCP and f/u with your PCP for further management.    11. Potential for cognitive impairment  Pt scored abnormal score of 4 on Cognitive Function Screen today (could draw clock but could not recall any of the 3 words). Pt admits to memory loss over time. This a new problem identified during clinic visit today. Follow-up with PCP for evaluation and treatment plan.    Please obtain any medical records from past / present outside medical providers and give to PCP for review and further medical recommendations.    Provided Natalia with a 5-10 year written screening schedule and personal prevention plan. Recommendations were developed using the USPSTF age appropriate recommendations. Education, counseling, and referrals were provided as needed. After Visit Summary printed and given to patient which includes a list of additional screenings\tests needed.    Follow-up in about 1 year (around 2/7/2020) for HRA. F/u with PCP Dr. Carlton as scheduled 6/5/19 and specialists as recommended for health management.    MANJU Silveira

## 2019-02-18 DIAGNOSIS — E11.9 TYPE 2 DIABETES MELLITUS WITHOUT COMPLICATION: ICD-10-CM

## 2019-02-18 RX ORDER — INSULIN PUMP SYRINGE, 3 ML
EACH MISCELLANEOUS
Qty: 1 EACH | Refills: 0 | Status: SHIPPED | OUTPATIENT
Start: 2019-02-18 | End: 2020-02-18

## 2019-02-18 RX ORDER — LANCETS
EACH MISCELLANEOUS
Qty: 300 EACH | Refills: 3 | Status: SHIPPED | OUTPATIENT
Start: 2019-02-18 | End: 2021-07-12

## 2019-02-18 RX ORDER — OLMESARTAN MEDOXOMIL / AMLODIPINE BESYLATE / HYDROCHLOROTHIAZIDE 40; 10; 12.5 MG/1; MG/1; MG/1
1 TABLET, FILM COATED ORAL DAILY
Qty: 90 TABLET | Refills: 3 | Status: SHIPPED | OUTPATIENT
Start: 2019-02-18 | End: 2020-02-18

## 2019-02-18 RX ORDER — METFORMIN HYDROCHLORIDE 500 MG/1
TABLET ORAL
Qty: 60 TABLET | Refills: 11 | Status: SHIPPED | OUTPATIENT
Start: 2019-02-18 | End: 2020-01-13 | Stop reason: SDUPTHER

## 2019-03-21 ENCOUNTER — TELEPHONE (OUTPATIENT)
Dept: INTERNAL MEDICINE | Facility: CLINIC | Age: 74
End: 2019-03-21

## 2019-03-21 NOTE — TELEPHONE ENCOUNTER
----- Message from Amarilys Whitley sent at 3/21/2019  4:52 PM CDT -----  Contact: pt  .Type:  Patient Returning Call    Who Called: pt  Who Left Message for Patient:nurse  Does the patient know what this is regarding?: missed call   Would the patient rather a call back or a response via [facility]? Call back   Best Call Back Number:[phone] (home)     Additional Information: ..

## 2019-03-21 NOTE — TELEPHONE ENCOUNTER
----- Message from Hue Werner sent at 3/21/2019 11:49 AM CDT -----  Contact: pt  Pt request call back wants to reschedule for an earlier date pt thinks she over due and needs to come in   ... [phone] (home)

## 2019-05-22 ENCOUNTER — PATIENT OUTREACH (OUTPATIENT)
Dept: ADMINISTRATIVE | Facility: HOSPITAL | Age: 74
End: 2019-05-22

## 2019-05-22 DIAGNOSIS — E11.42 CONTROLLED TYPE 2 DIABETES MELLITUS WITH DIABETIC POLYNEUROPATHY, WITHOUT LONG-TERM CURRENT USE OF INSULIN: Primary | ICD-10-CM

## 2019-05-22 NOTE — PROGRESS NOTES
Health maintenance reviewed. Immunizations reviewed and reconciled. Spoke with patient  DEXA done at [facility] with Dr. Bradford.  Patient agreed to sign KASHIF at OV with Dr. Santos  On 6/5/2019. Pt. Requested Urine Micro lab same date at already scheduled [facility].  Ordered WOG and scheduled.  Appt. Note to have patient sign KASHIF for DEXA.

## 2019-05-29 ENCOUNTER — IMMUNIZATION (OUTPATIENT)
Dept: PHARMACY | Facility: CLINIC | Age: 74
End: 2019-05-29
Payer: MEDICARE

## 2019-05-29 ENCOUNTER — LAB VISIT (OUTPATIENT)
Dept: LAB | Facility: HOSPITAL | Age: 74
End: 2019-05-29
Attending: INTERNAL MEDICINE
Payer: MEDICARE

## 2019-05-29 DIAGNOSIS — E11.42 CONTROLLED TYPE 2 DIABETES MELLITUS WITH DIABETIC POLYNEUROPATHY, WITHOUT LONG-TERM CURRENT USE OF INSULIN: ICD-10-CM

## 2019-05-29 LAB
ESTIMATED AVG GLUCOSE: 148 MG/DL (ref 68–131)
HBA1C MFR BLD HPLC: 6.8 % (ref 4–5.6)

## 2019-05-29 PROCEDURE — 83036 HEMOGLOBIN GLYCOSYLATED A1C: CPT

## 2019-05-29 PROCEDURE — 36415 COLL VENOUS BLD VENIPUNCTURE: CPT

## 2019-06-05 ENCOUNTER — OFFICE VISIT (OUTPATIENT)
Dept: FAMILY MEDICINE | Facility: CLINIC | Age: 74
End: 2019-06-05
Payer: MEDICARE

## 2019-06-05 VITALS
HEIGHT: 62 IN | WEIGHT: 138.69 LBS | HEART RATE: 76 BPM | BODY MASS INDEX: 25.52 KG/M2 | SYSTOLIC BLOOD PRESSURE: 124 MMHG | DIASTOLIC BLOOD PRESSURE: 68 MMHG | TEMPERATURE: 99 F | OXYGEN SATURATION: 96 %

## 2019-06-05 DIAGNOSIS — I15.2 HYPERTENSION ASSOCIATED WITH DIABETES: ICD-10-CM

## 2019-06-05 DIAGNOSIS — J45.20 MILD INTERMITTENT ASTHMA WITHOUT COMPLICATION: ICD-10-CM

## 2019-06-05 DIAGNOSIS — E11.42 CONTROLLED TYPE 2 DIABETES MELLITUS WITH DIABETIC POLYNEUROPATHY, WITHOUT LONG-TERM CURRENT USE OF INSULIN: ICD-10-CM

## 2019-06-05 DIAGNOSIS — E55.9 VITAMIN D DEFICIENCY: ICD-10-CM

## 2019-06-05 DIAGNOSIS — E11.59 HYPERTENSION ASSOCIATED WITH DIABETES: ICD-10-CM

## 2019-06-05 DIAGNOSIS — E11.69 HYPERLIPIDEMIA ASSOCIATED WITH TYPE 2 DIABETES MELLITUS: ICD-10-CM

## 2019-06-05 DIAGNOSIS — E03.9 ACQUIRED HYPOTHYROIDISM: Primary | ICD-10-CM

## 2019-06-05 DIAGNOSIS — E78.5 HYPERLIPIDEMIA ASSOCIATED WITH TYPE 2 DIABETES MELLITUS: ICD-10-CM

## 2019-06-05 DIAGNOSIS — Z29.9 PREVENTIVE MEASURE: ICD-10-CM

## 2019-06-05 DIAGNOSIS — F41.9 ANXIETY: ICD-10-CM

## 2019-06-05 PROCEDURE — 99214 OFFICE O/P EST MOD 30 MIN: CPT | Mod: S$PBB,,, | Performed by: INTERNAL MEDICINE

## 2019-06-05 PROCEDURE — 99999 PR PBB SHADOW E&M-EST. PATIENT-LVL III: CPT | Mod: PBBFAC,,, | Performed by: INTERNAL MEDICINE

## 2019-06-05 PROCEDURE — 99999 PR PBB SHADOW E&M-EST. PATIENT-LVL III: ICD-10-PCS | Mod: PBBFAC,,, | Performed by: INTERNAL MEDICINE

## 2019-06-05 PROCEDURE — 99213 OFFICE O/P EST LOW 20 MIN: CPT | Mod: PBBFAC,PO | Performed by: INTERNAL MEDICINE

## 2019-06-05 PROCEDURE — 99214 PR OFFICE/OUTPT VISIT, EST, LEVL IV, 30-39 MIN: ICD-10-PCS | Mod: S$PBB,,, | Performed by: INTERNAL MEDICINE

## 2019-06-05 RX ORDER — POLYETHYLENE GLYCOL 3350 17 G/17G
17 POWDER, FOR SOLUTION ORAL DAILY
Qty: 1530 G | Refills: 3 | Status: SHIPPED | OUTPATIENT
Start: 2019-06-05 | End: 2020-09-24 | Stop reason: SDUPTHER

## 2019-06-05 NOTE — PROGRESS NOTES
"Subjective:      Patient ID: Natalia Mckeon is a 74 y.o. female.    Chief Complaint: Follow-up      HPI  Here for follow up of medical problems.  Off of januvia for a few months.  Breathing well.  NO f/c/sw/cough.  Anxiety doing well.  BMs normal.  Saw outside Cardiologist last month, no changes in treatment.    Updated/ annual due 12/19:  HM: 10/18 fluvax, 7/15 [id number], 2013 [id number] in [place], 6/14 TDaP, 10/14 zostervax at pharmacy, 5/19 Shingrix #1,  7/18 mmg/Gyn Dr. Obrien, bmd per her, 3/14 Cscope Dr. Auguste rep 4y, 3/18 Eye Dr. Phillip, 11/16 HCV neg, Cards Dr. Jm Vasquez.       Review of Systems   Constitutional: Negative for chills, diaphoresis and fever.   Respiratory: Negative for cough and shortness of breath.    Cardiovascular: Negative for chest pain, palpitations and leg swelling.   Gastrointestinal: Negative for blood in stool, constipation, diarrhea, nausea and vomiting.   Genitourinary: Negative for dysuria, frequency and hematuria.   Psychiatric/Behavioral: The patient is not nervous/anxious.          Objective:   /68 (BP Location: Right arm, Patient Position: Sitting, BP Method: Medium (Manual))   Pulse 76   Temp 98.5 °F (36.9 °C) (Oral)   Ht 5' 2" (1.575 m)   Wt 62.9 kg (138 lb 10.7 oz)   SpO2 96%   BMI 25.36 kg/m²     Physical Exam   Constitutional: She is oriented to person, place, and time. She appears well-developed.   HENT:   Mouth/Throat: Oropharynx is clear and moist.   Neck: Neck supple. Carotid bruit is not present. No thyroid mass present.   Cardiovascular: Normal rate, regular rhythm and intact distal pulses. Exam reveals no gallop and no friction rub.   No murmur heard.  Pulmonary/Chest: Effort normal and breath sounds normal. She has no wheezes. She has no rales.   Abdominal: Soft. Bowel sounds are normal. She exhibits no mass. There is no hepatosplenomegaly. There is no tenderness.   Musculoskeletal: She exhibits no edema.   Lymphadenopathy:     She has no " cervical adenopathy.   Neurological: She is alert and oriented to person, place, and time.   Psychiatric: She has a normal mood and affect.       Results for OTILIO PEOPLES (MRN [id number]) as of 6/5/2019 15:19   Ref. Range 5/29/2019 09:37 5/29/2019 09:55   Hemoglobin A1C External Latest Ref Range: 4.0 - 5.6 % 6.8 (H)    Estimated Avg Glucose Latest Ref Range: 68 - 131 mg/dL 148 (H)    Microalbum.,U,Random Latest Units: ug/mL  3.0   Creatinine, Random Ur Latest Ref Range: 15.0 - 325.0 mg/dL  53.0   MICROALB/CREAT RATIO Latest Ref Range: 0.0 - 30.0 ug/mg  5.7       Assessment:       1. Acquired hypothyroidism    2. Controlled type 2 diabetes mellitus with diabetic polyneuropathy, without long-term current use of insulin    3. Anxiety    4. Hypertension associated with diabetes    5. Mild intermittent asthma without complication    6. Preventive measure    7. Hyperlipidemia associated with type 2 diabetes mellitus    8. Vitamin D deficiency          Plan:     Acquired hypothyroidism- Clinically stable, continue present treatment.  -     TSH; Future; Expected date: 06/05/2019    Controlled type 2 diabetes mellitus with diabetic polyneuropathy, without long-term current use of insulin  -     blood sugar diagnostic, drum (ACCU-CHEK COMPACT PLUS TEST) Strp; TEST THREE TIMES A DAY  Dispense: 102 strip; Refill: 0  -     CBC auto differential; Future; Expected date: 06/05/2019  -     Comprehensive metabolic panel; Future; Expected date: 06/05/2019  -     Lipid panel; Future; Expected date: 06/05/2019  -     TSH; Future; Expected date: 06/05/2019  -     Hemoglobin A1c; Future; Expected date: 06/05/2019  -     Microalbumin/creatinine urine ratio; Future; Expected date: 06/05/2019    Anxiety- stable, cont rx.    Hypertension associated with diabetes- stable, cont rx.    Mild intermittent asthma without complication- doing well.    Preventive measure- due in 6mo.  HAV if she decides.  -     CBC auto differential; Future; Expected  date: 06/05/2019  -     Comprehensive metabolic panel; Future; Expected date: 06/05/2019  -     Lipid panel; Future; Expected date: 06/05/2019  -     TSH; Future; Expected date: 06/05/2019  -     Hemoglobin A1c; Future; Expected date: 06/05/2019  -     Microalbumin/creatinine urine ratio; Future; Expected date: 06/05/2019    Hyperlipidemia associated with type 2 diabetes mellitus- cont crestor, recheck 6mo.    Vitamin D deficiency  -     Vitamin D; Future    Other orders  -     polyethylene glycol (GLYCOLAX) 17 gram/dose powder; Take 17 g by mouth once daily.  Dispense: 1530 g; Refill: 3

## 2019-07-24 ENCOUNTER — PATIENT OUTREACH (OUTPATIENT)
Dept: ADMINISTRATIVE | Facility: HOSPITAL | Age: 74
End: 2019-07-24

## 2019-07-31 ENCOUNTER — IMMUNIZATION (OUTPATIENT)
Dept: PHARMACY | Facility: CLINIC | Age: 74
End: 2019-07-31
Payer: MEDICARE

## 2019-08-09 ENCOUNTER — OFFICE VISIT (OUTPATIENT)
Dept: OPHTHALMOLOGY | Facility: CLINIC | Age: 74
End: 2019-08-09
Payer: MEDICARE

## 2019-08-09 DIAGNOSIS — H04.123 DRY EYES, BILATERAL: ICD-10-CM

## 2019-08-09 DIAGNOSIS — E11.9 TYPE 2 DIABETES MELLITUS WITHOUT COMPLICATION, WITHOUT LONG-TERM CURRENT USE OF INSULIN: ICD-10-CM

## 2019-08-09 DIAGNOSIS — Z83.511 FAMILY HISTORY OF OPEN-ANGLE GLAUCOMA: ICD-10-CM

## 2019-08-09 DIAGNOSIS — H40.1131 PRIMARY OPEN ANGLE GLAUCOMA OF BOTH EYES, MILD STAGE: Primary | ICD-10-CM

## 2019-08-09 PROCEDURE — 99212 OFFICE O/P EST SF 10 MIN: CPT | Mod: PBBFAC | Performed by: OPHTHALMOLOGY

## 2019-08-09 PROCEDURE — 92012 INTRM OPH EXAM EST PATIENT: CPT | Mod: S$PBB,,, | Performed by: OPHTHALMOLOGY

## 2019-08-09 PROCEDURE — 99999 PR PBB SHADOW E&M-EST. PATIENT-LVL II: ICD-10-PCS | Mod: PBBFAC,,, | Performed by: OPHTHALMOLOGY

## 2019-08-09 PROCEDURE — 99999 PR PBB SHADOW E&M-EST. PATIENT-LVL II: CPT | Mod: PBBFAC,,, | Performed by: OPHTHALMOLOGY

## 2019-08-09 PROCEDURE — 92012 PR EYE EXAM, EST PATIENT,INTERMED: ICD-10-PCS | Mod: S$PBB,,, | Performed by: OPHTHALMOLOGY

## 2019-08-09 NOTE — PROGRESS NOTES
HPI     Glaucoma      Additional comments: Timolol bid ou,Latanoprost qhs ou              Comments     Patient returns for a 6 month iop check patient states she forgets to use   both drops and did not use this am.        DM SINCE 2006  BDR  H/o DME OD  VMA  COAG (NEWLY DX 3/2018)  +FAMHX-GLAUCOMA-BROTHER      OU LATANOPROST QHS TIMOLOL BID           Last edited by ALONZO Wells on 8/9/2019 10:13 AM. (History)            Assessment /Plan     For exam results, see Encounter Report.      ICD-10-CM ICD-9-CM    1. Primary open angle glaucoma of both eyes, mild stage H40.1131 365.11 IOP elevated, but did not use timolol this morning. Will follow      365.71    2. Family history of open-angle glaucoma Z83.511 V19.11    3. Dry eyes, bilateral H04.123 375.15 Well    4. Type 2 diabetes mellitus without complication, without long-term current use of insulin E11.9 250.00 Dilated next visit      OU LATANOPROST QHS TIMOLOL BID    Return to clinic 3 months with HVF, dilation, and GOCT

## 2019-09-26 DIAGNOSIS — R10.13 DYSPEPSIA: ICD-10-CM

## 2019-09-26 DIAGNOSIS — E11.42 CONTROLLED TYPE 2 DIABETES MELLITUS WITH DIABETIC POLYNEUROPATHY, WITHOUT LONG-TERM CURRENT USE OF INSULIN: ICD-10-CM

## 2019-09-26 RX ORDER — OMEPRAZOLE 40 MG/1
CAPSULE, DELAYED RELEASE ORAL
Qty: 90 CAPSULE | Refills: 3 | Status: SHIPPED | OUTPATIENT
Start: 2019-09-26 | End: 2020-09-17 | Stop reason: SDUPTHER

## 2019-09-27 RX ORDER — TIMOLOL MALEATE 5 MG/ML
1 SOLUTION/ DROPS OPHTHALMIC 2 TIMES DAILY
Qty: 10 ML | Refills: 4 | Status: SHIPPED | OUTPATIENT
Start: 2019-09-27 | End: 2020-05-20 | Stop reason: SDUPTHER

## 2019-10-01 DIAGNOSIS — E03.9 ACQUIRED HYPOTHYROIDISM: ICD-10-CM

## 2019-10-01 RX ORDER — LEVOTHYROXINE SODIUM 25 UG/1
TABLET ORAL
Qty: 30 TABLET | Refills: 3 | Status: SHIPPED | OUTPATIENT
Start: 2019-10-01 | End: 2020-06-15 | Stop reason: SDUPTHER

## 2019-10-01 RX ORDER — POTASSIUM CHLORIDE 20 MEQ/1
TABLET, EXTENDED RELEASE ORAL
Qty: 90 TABLET | Refills: 3 | Status: SHIPPED | OUTPATIENT
Start: 2019-10-01 | End: 2020-10-21 | Stop reason: SDUPTHER

## 2019-11-05 RX ORDER — ROSUVASTATIN CALCIUM 10 MG/1
TABLET, COATED ORAL
Qty: 30 TABLET | Refills: 11 | Status: SHIPPED | OUTPATIENT
Start: 2019-11-05 | End: 2021-04-22 | Stop reason: SDUPTHER

## 2019-11-20 ENCOUNTER — OFFICE VISIT (OUTPATIENT)
Dept: OPHTHALMOLOGY | Facility: CLINIC | Age: 74
End: 2019-11-20
Payer: MEDICARE

## 2019-11-20 ENCOUNTER — APPOINTMENT (OUTPATIENT)
Dept: OPHTHALMOLOGY | Facility: CLINIC | Age: 74
End: 2019-11-20
Payer: MEDICARE

## 2019-11-20 DIAGNOSIS — Z83.511 FAMILY HISTORY OF OPEN-ANGLE GLAUCOMA: ICD-10-CM

## 2019-11-20 DIAGNOSIS — H04.123 DRY EYES, BILATERAL: ICD-10-CM

## 2019-11-20 DIAGNOSIS — H25.12 SENILE NUCLEAR CATARACT, LEFT: ICD-10-CM

## 2019-11-20 DIAGNOSIS — Z91.199 NONCOMPLIANCE: ICD-10-CM

## 2019-11-20 DIAGNOSIS — H25.11 SENILE NUCLEAR CATARACT, RIGHT: ICD-10-CM

## 2019-11-20 DIAGNOSIS — H40.1131 PRIMARY OPEN ANGLE GLAUCOMA OF BOTH EYES, MILD STAGE: Primary | ICD-10-CM

## 2019-11-20 DIAGNOSIS — E11.9 TYPE 2 DIABETES MELLITUS WITHOUT COMPLICATION, WITHOUT LONG-TERM CURRENT USE OF INSULIN: ICD-10-CM

## 2019-11-20 PROCEDURE — 92133 POSTERIOR SEGMENT OCT OPTIC NERVE(OCULAR COHERENCE TOMOGRAPHY) - OU - BOTH EYES: ICD-10-PCS | Mod: 26,S$PBB,, | Performed by: OPHTHALMOLOGY

## 2019-11-20 PROCEDURE — 99999 PR PBB SHADOW E&M-EST. PATIENT-LVL III: ICD-10-PCS | Mod: PBBFAC,,, | Performed by: OPHTHALMOLOGY

## 2019-11-20 PROCEDURE — 92083 EXTENDED VISUAL FIELD XM: CPT | Mod: PBBFAC | Performed by: OPHTHALMOLOGY

## 2019-11-20 PROCEDURE — 92133 CPTRZD OPH DX IMG PST SGM ON: CPT | Mod: PBBFAC | Performed by: OPHTHALMOLOGY

## 2019-11-20 PROCEDURE — 92083 HUMPHREY VISUAL FIELD - OU - BOTH EYES: ICD-10-PCS | Mod: 26,S$PBB,, | Performed by: OPHTHALMOLOGY

## 2019-11-20 PROCEDURE — 99213 OFFICE O/P EST LOW 20 MIN: CPT | Mod: PBBFAC | Performed by: OPHTHALMOLOGY

## 2019-11-20 PROCEDURE — 92014 COMPRE OPH EXAM EST PT 1/>: CPT | Mod: S$PBB,,, | Performed by: OPHTHALMOLOGY

## 2019-11-20 PROCEDURE — 99999 PR PBB SHADOW E&M-EST. PATIENT-LVL III: CPT | Mod: PBBFAC,,, | Performed by: OPHTHALMOLOGY

## 2019-11-20 PROCEDURE — 92014 PR EYE EXAM, EST PATIENT,COMPREHESV: ICD-10-PCS | Mod: S$PBB,,, | Performed by: OPHTHALMOLOGY

## 2019-11-20 RX ORDER — TIMOLOL MALEATE 5 MG/ML
1 SOLUTION/ DROPS OPHTHALMIC 2 TIMES DAILY
Qty: 10 ML | Refills: 6 | Status: SHIPPED | OUTPATIENT
Start: 2019-11-20 | End: 2020-07-16 | Stop reason: SDUPTHER

## 2019-11-20 RX ORDER — LATANOPROST 50 UG/ML
1 SOLUTION/ DROPS OPHTHALMIC NIGHTLY
Qty: 2.5 ML | Refills: 11 | Status: SHIPPED | OUTPATIENT
Start: 2019-11-20 | End: 2020-05-20 | Stop reason: SDUPTHER

## 2019-11-20 NOTE — PROGRESS NOTES
HPI     Glaucoma      Additional comments: 3 month IOP check with HVF, DFE and GOCT              Comments     Patient feels OU is doing well, states she only uses her drops a few   times a week secondary to forgetting.  Also at times BUL itch at the base   of lashes.  No changes in VA and happy with current glasses.    DM SINCE 2006  BDR  H/o DME OD  VMA  COAG (NEWLY DX 3/2018)  +FAMHX-GLAUCOMA-BROTHER      OU LATANOPROST QHS TIMOLOL BID          Last edited by Anastasia Beach, Patient Care Assistant on 11/20/2019  9:39   AM. (History)            Assessment /Plan     For exam results, see Encounter Report.      ICD-10-CM ICD-9-CM    1. Primary open angle glaucoma of both eyes, mild stage H40.1131 365.11 Carlos Visual Field - OU - Extended - Both Eyes     365.71 Posterior Segment OCT Optic Nerve- Both eyes appears stable at this time, encourage compliance    2. Type 2 diabetes mellitus without complication, without long-term current use of insulin E11.9 250.00 Diabetes with no diabetic retinopathy on dilated exam.   Reviewed diabetic eye precautions including excellent blood sugar control, and importance of regular follow up.          3. Senile nuclear cataract, left H25.12 366.16   You were found to have an early cataract in your eye(s) today, however the cataract is not affecting your activities of daily living, such as reading and driving.You do not need  surgery at this time. We will recheck your cataract at your next visit. You are welcome to call for an earlier appointment if your vision gets worse.      4. Senile nuclear cataract, right H25.11 366.16 You were found to have an early cataract in your eye(s) today, however the cataract is not affecting your activities of daily living, such as reading and driving.You do not need  surgery at this time. We will recheck your cataract at your next visit. You are welcome to call for an earlier appointment if your vision gets worse.    5. Noncompliance Z91.19 V15.81   Increases risk of irreversible visual loss due to inadequate compliance discussed. Lengthy discussion regarding importance of absolute compliance with treatment regimen.    Pt understands and will improve her compliance      6. Family history of open-angle glaucoma Z83.511 V19.11    7. Dry eyes, bilateral H04.123 375.15 Appears stable, follow        RETURN TO CLINIC 4 month IOP, refract if pt desires     OU LATANOPROST QHS TIMOLOL BID

## 2019-12-02 ENCOUNTER — TELEPHONE (OUTPATIENT)
Dept: FAMILY MEDICINE | Facility: CLINIC | Age: 74
End: 2019-12-02

## 2019-12-12 DIAGNOSIS — E11.42 CONTROLLED TYPE 2 DIABETES MELLITUS WITH DIABETIC POLYNEUROPATHY, WITHOUT LONG-TERM CURRENT USE OF INSULIN: ICD-10-CM

## 2020-01-13 DIAGNOSIS — E11.9 TYPE 2 DIABETES MELLITUS WITHOUT COMPLICATION: ICD-10-CM

## 2020-01-13 RX ORDER — METFORMIN HYDROCHLORIDE 500 MG/1
500 TABLET ORAL 2 TIMES DAILY WITH MEALS
Qty: 60 TABLET | Refills: 11 | Status: SHIPPED | OUTPATIENT
Start: 2020-01-13 | End: 2021-01-06 | Stop reason: SDUPTHER

## 2020-02-18 RX ORDER — OLMESARTAN MEDOXOMIL / AMLODIPINE BESYLATE / HYDROCHLOROTHIAZIDE 40; 10; 12.5 MG/1; MG/1; MG/1
TABLET, FILM COATED ORAL
Qty: 30 TABLET | Refills: 11 | Status: SHIPPED | OUTPATIENT
Start: 2020-02-18 | End: 2021-02-24 | Stop reason: SDUPTHER

## 2020-03-19 DIAGNOSIS — E11.42 CONTROLLED TYPE 2 DIABETES MELLITUS WITH DIABETIC POLYNEUROPATHY, WITHOUT LONG-TERM CURRENT USE OF INSULIN: ICD-10-CM

## 2020-04-09 DIAGNOSIS — E11.42 CONTROLLED TYPE 2 DIABETES MELLITUS WITH DIABETIC POLYNEUROPATHY, WITHOUT LONG-TERM CURRENT USE OF INSULIN: ICD-10-CM

## 2020-04-21 ENCOUNTER — TELEPHONE (OUTPATIENT)
Dept: FAMILY MEDICINE | Facility: CLINIC | Age: 75
End: 2020-04-21

## 2020-04-21 NOTE — TELEPHONE ENCOUNTER
S/w pt.  Concerned about upcoming appt d/t Covid 19.  Assured pt ok to postpone annual if she is not having any urgent problems and we will contact her at a later date to reschedule.  Pt agreed./rpr

## 2020-04-21 NOTE — TELEPHONE ENCOUNTER
----- Message from Hue Werner sent at 4/21/2020 11:52 AM CDT -----  Contact: pt  Pt called to reschedule fasting and urine lab... No solutions found for fasting lab ... Call back : [phone] (home)

## 2020-05-13 ENCOUNTER — TELEPHONE (OUTPATIENT)
Dept: FAMILY MEDICINE | Facility: CLINIC | Age: 75
End: 2020-05-13

## 2020-05-13 NOTE — TELEPHONE ENCOUNTER
----- Message from Kiki Downey sent at 2020 10:57 AM CDT -----  Contact: self  Pt requesting to have lab work scheduled before her 20 appt. CBC order will  on 2020. Please call pt back to schedule labs at [phone]

## 2020-05-19 ENCOUNTER — PATIENT OUTREACH (OUTPATIENT)
Dept: ADMINISTRATIVE | Facility: OTHER | Age: 75
End: 2020-05-19

## 2020-05-20 ENCOUNTER — OFFICE VISIT (OUTPATIENT)
Dept: OPHTHALMOLOGY | Facility: CLINIC | Age: 75
End: 2020-05-20
Payer: MEDICARE

## 2020-05-20 DIAGNOSIS — H25.12 SENILE NUCLEAR CATARACT, LEFT: ICD-10-CM

## 2020-05-20 DIAGNOSIS — E11.9 TYPE 2 DIABETES MELLITUS WITHOUT COMPLICATION, WITHOUT LONG-TERM CURRENT USE OF INSULIN: ICD-10-CM

## 2020-05-20 DIAGNOSIS — H52.7 REFRACTION DISORDER: ICD-10-CM

## 2020-05-20 DIAGNOSIS — H25.11 SENILE NUCLEAR CATARACT, RIGHT: ICD-10-CM

## 2020-05-20 DIAGNOSIS — H40.1131 PRIMARY OPEN ANGLE GLAUCOMA OF BOTH EYES, MILD STAGE: Primary | ICD-10-CM

## 2020-05-20 PROCEDURE — 92012 PR EYE EXAM, EST PATIENT,INTERMED: ICD-10-PCS | Mod: S$PBB,,, | Performed by: OPHTHALMOLOGY

## 2020-05-20 PROCEDURE — 92012 INTRM OPH EXAM EST PATIENT: CPT | Mod: S$PBB,,, | Performed by: OPHTHALMOLOGY

## 2020-05-20 PROCEDURE — 99212 OFFICE O/P EST SF 10 MIN: CPT | Mod: PBBFAC | Performed by: OPHTHALMOLOGY

## 2020-05-20 PROCEDURE — 99999 PR PBB SHADOW E&M-EST. PATIENT-LVL II: CPT | Mod: PBBFAC,,, | Performed by: OPHTHALMOLOGY

## 2020-05-20 PROCEDURE — 99999 PR PBB SHADOW E&M-EST. PATIENT-LVL II: ICD-10-PCS | Mod: PBBFAC,,, | Performed by: OPHTHALMOLOGY

## 2020-05-20 RX ORDER — TIMOLOL MALEATE 5 MG/ML
1 SOLUTION/ DROPS OPHTHALMIC 2 TIMES DAILY
Qty: 10 ML | Refills: 4 | Status: SHIPPED | OUTPATIENT
Start: 2020-05-20 | End: 2021-05-20

## 2020-05-20 RX ORDER — LATANOPROST 50 UG/ML
1 SOLUTION/ DROPS OPHTHALMIC NIGHTLY
Qty: 2.5 ML | Refills: 11 | Status: SHIPPED | OUTPATIENT
Start: 2020-05-20 | End: 2020-07-16 | Stop reason: SDUPTHER

## 2020-05-20 NOTE — PROGRESS NOTES
HPI     Glaucoma     Comments: 4M IOP Check              Comments     The patient states that her eyes are doing well although they itch. She   denies any pain. She is no longer taking any drops because her eyes were   red and now that she has stopped they're better.     DM SINCE 2006  BDR  H/o DME OD  VMA  COAG (NEWLY DX 3/2018)  +FAMHX-GLAUCOMA-BROTHER      OU: LATANOPROST QHS (she is no longer taking)  OU:TIMOLOL BID (she is no longer taking)          Last edited by Soraya Briseno on 5/20/2020  8:55 AM. (History)            Assessment /Plan     For exam results, see Encounter Report.      ICD-10-CM ICD-9-CM    1. Primary open angle glaucoma of both eyes, mild stage H40.1131 365.11 IOP elevated OU today, but has been off drops. Trouble tolerating them.    IOP not within acceptable range relative to target IOP with risk of irreversible visual loss. Additional treatment required.  Discussed options, risks, and benefits of additional medication, SLT laser, or incisional glaucoma surgery.     recommend SLT, or resume drops    Patient chooses resume drops, will try to tolerate     Reviewed importance of continued compliance with treatment and follow up.        365.71    2. Type 2 diabetes mellitus without complication, without long-term current use of insulin E11.9 250.00 Dilated 11/20/2018   3. Senile nuclear cataract, left H25.12 366.16 Continue to follow      4. Senile nuclear cataract, right H25.11 366.16        Resume latanopropst qd OU and timolol BID   Return to clinic 6 weeks with IOP check     JL

## 2020-06-22 ENCOUNTER — LAB VISIT (OUTPATIENT)
Dept: LAB | Facility: HOSPITAL | Age: 75
End: 2020-06-22
Attending: INTERNAL MEDICINE
Payer: MEDICARE

## 2020-06-22 DIAGNOSIS — Z29.9 PREVENTIVE MEASURE: ICD-10-CM

## 2020-06-22 DIAGNOSIS — E78.5 HYPERLIPIDEMIA ASSOCIATED WITH TYPE 2 DIABETES MELLITUS: ICD-10-CM

## 2020-06-22 DIAGNOSIS — E11.69 HYPERLIPIDEMIA ASSOCIATED WITH TYPE 2 DIABETES MELLITUS: ICD-10-CM

## 2020-06-22 DIAGNOSIS — E11.42 CONTROLLED TYPE 2 DIABETES MELLITUS WITH DIABETIC POLYNEUROPATHY, WITHOUT LONG-TERM CURRENT USE OF INSULIN: ICD-10-CM

## 2020-06-22 PROCEDURE — 83036 HEMOGLOBIN GLYCOSYLATED A1C: CPT

## 2020-06-22 PROCEDURE — 36415 COLL VENOUS BLD VENIPUNCTURE: CPT

## 2020-06-23 LAB
ESTIMATED AVG GLUCOSE: 154 MG/DL (ref 68–131)
HBA1C MFR BLD HPLC: 7 % (ref 4–5.6)

## 2020-06-29 NOTE — PROGRESS NOTES
"Subjective:      Patient ID: Natalia Mckeon is a 75 y.o. female.    Chief Complaint: Follow-up      HPI  Here for f/u medical problems and preventive exam.  Energy good.  Does some walking, but misses gym due to covid.  No f/c/sw/cough.  Breathing well on advair.  No cp/sob/palp.  BMs and urine normal.  No vit D.      HM: 10/18 fluvax, 7/15 [id number], 2/18 booster [id number], 6/14 TDaP, 10/14 zostervax at pharmacy, 5/19 Shingrix #1,  7/19 mmg/Gyn Dr. Obrien, bmd per her, 3/19 Cscope Dr. Auguste rep 5y, 5/20 Eye Dr. Phillip, 11/16 HCV neg, Cards Dr. Jm Vasquez.     Review of Systems   Constitutional: Negative for appetite change, chills, diaphoresis and fever.   HENT: Negative for congestion, ear pain, rhinorrhea, sinus pressure and sore throat.    Respiratory: Negative for cough, chest tightness and shortness of breath.    Cardiovascular: Negative for chest pain and palpitations.   Gastrointestinal: Negative for blood in stool, constipation, diarrhea, nausea and vomiting.   Genitourinary: Negative for dysuria, frequency, hematuria, menstrual problem, urgency and vaginal discharge.   Musculoskeletal: Negative for arthralgias.   Skin: Negative for rash.   Neurological: Negative for dizziness and headaches.   Psychiatric/Behavioral: Negative for sleep disturbance. The patient is not nervous/anxious.          Objective:   /68   Pulse 88   Temp 97.7 °F (36.5 °C)   Ht 5' 2" (1.575 m)   Wt 63.7 kg (140 lb 6.9 oz)   SpO2 97%   BMI 25.69 kg/m²     Physical Exam  Constitutional:       Appearance: She is well-developed.   HENT:      Right Ear: External ear normal. Tympanic membrane is not injected.      Left Ear: External ear normal. Tympanic membrane is not injected.   Eyes:      Conjunctiva/sclera: Conjunctivae normal.   Neck:      Musculoskeletal: Normal range of motion and neck supple.      Thyroid: No thyromegaly.   Cardiovascular:      Rate and Rhythm: Normal rate and regular rhythm.      Pulses:          "  Dorsalis pedis pulses are 2+ on the right side and 2+ on the left side.        Posterior tibial pulses are 2+ on the right side and 2+ on the left side.      Heart sounds: No murmur. No friction rub. No gallop.    Pulmonary:      Effort: Pulmonary effort is normal.      Breath sounds: Normal breath sounds. No wheezing or rales.   Abdominal:      General: Bowel sounds are normal.      Palpations: Abdomen is soft. There is no mass.      Tenderness: There is no abdominal tenderness.   Feet:      Right foot:      Protective Sensation: 10 sites tested. 10 sites sensed.      Skin integrity: No ulcer, blister, skin breakdown, erythema, warmth, callus or dry skin.      Left foot:      Protective Sensation: 10 sites tested. 10 sites sensed.      Skin integrity: No ulcer, blister, skin breakdown, erythema, warmth, callus or dry skin.   Lymphadenopathy:      Cervical: No cervical adenopathy.   Skin:     General: Skin is warm.      Findings: No rash.   Neurological:      Mental Status: She is alert and oriented to person, place, and time.           Results for OTILIO PEOPLES (MRN [id number]) as of 6/30/2020 16:28   Ref. Range 6/22/2020 13:05 6/22/2020 13:15   Hemoglobin A1C External Latest Ref Range: 4.0 - 5.6 %  7.0 (H)   Estimated Avg Glucose Latest Ref Range: 68 - 131 mg/dL  154 (H)   Microalbum.,U,Random Latest Units: ug/mL 12.0    Creatinine, Random Ur Latest Ref Range: 15.0 - 325.0 mg/dL 107.0    MICROALB/CREAT RATIO Latest Ref Range: 0.0 - 30.0 ug/mg 11.2      Assessment:       1. Acquired hypothyroidism    2. Anxiety    3. Controlled type 2 diabetes mellitus with diabetic polyneuropathy, without long-term current use of insulin    4. Hypertension associated with diabetes    5. Hyperlipidemia associated with type 2 diabetes mellitus    6. Mild intermittent asthma without complication    7. Vitamin D deficiency    8. Encounter for preventive health examination    9. Seasonal allergic rhinitis due to pollen          Plan:      Acquired hypothyroidism- Clinically stable, continue present treatment.  Check lab.  -     TSH; Future; Expected date: 06/30/2020    Anxiety- stable, cont rx.    Controlled type 2 diabetes mellitus with diabetic polyneuropathy, without long-term current use of insulin  -     blood sugar diagnostic Strp; To check BG 3 times daily, to use with insurance preferred meter  Dispense: 300 strip; Refill: 3    Hypertension associated with diabetes- stable, cont rx.    Hyperlipidemia associated with type 2 diabetes mellitus  -     CBC auto differential; Future; Expected date: 06/30/2020  -     Comprehensive metabolic panel; Future; Expected date: 06/30/2020  -     Lipid Panel; Future; Expected date: 06/30/2020    Mild intermittent asthma without complication  -     fluticasone-salmeterol diskus inhaler 250-50 mcg; Inhale 1 puff into the lungs 2 (two) times daily.; Refill: 0    Vitamin D deficiency  -     Vitamin D; Future    Encounter for preventive health examination- utd.    Seasonal allergic rhinitis due to pollen

## 2020-06-30 ENCOUNTER — OFFICE VISIT (OUTPATIENT)
Dept: FAMILY MEDICINE | Facility: CLINIC | Age: 75
End: 2020-06-30
Payer: MEDICARE

## 2020-06-30 VITALS
BODY MASS INDEX: 25.84 KG/M2 | SYSTOLIC BLOOD PRESSURE: 128 MMHG | OXYGEN SATURATION: 97 % | HEART RATE: 88 BPM | TEMPERATURE: 98 F | WEIGHT: 140.44 LBS | DIASTOLIC BLOOD PRESSURE: 68 MMHG | HEIGHT: 62 IN

## 2020-06-30 DIAGNOSIS — E78.5 HYPERLIPIDEMIA ASSOCIATED WITH TYPE 2 DIABETES MELLITUS: ICD-10-CM

## 2020-06-30 DIAGNOSIS — J45.20 MILD INTERMITTENT ASTHMA WITHOUT COMPLICATION: ICD-10-CM

## 2020-06-30 DIAGNOSIS — E11.59 HYPERTENSION ASSOCIATED WITH DIABETES: ICD-10-CM

## 2020-06-30 DIAGNOSIS — E11.42 CONTROLLED TYPE 2 DIABETES MELLITUS WITH DIABETIC POLYNEUROPATHY, WITHOUT LONG-TERM CURRENT USE OF INSULIN: ICD-10-CM

## 2020-06-30 DIAGNOSIS — E11.69 HYPERLIPIDEMIA ASSOCIATED WITH TYPE 2 DIABETES MELLITUS: ICD-10-CM

## 2020-06-30 DIAGNOSIS — Z00.00 ENCOUNTER FOR PREVENTIVE HEALTH EXAMINATION: ICD-10-CM

## 2020-06-30 DIAGNOSIS — F41.9 ANXIETY: ICD-10-CM

## 2020-06-30 DIAGNOSIS — E03.9 ACQUIRED HYPOTHYROIDISM: Primary | ICD-10-CM

## 2020-06-30 DIAGNOSIS — I15.2 HYPERTENSION ASSOCIATED WITH DIABETES: ICD-10-CM

## 2020-06-30 DIAGNOSIS — J30.1 SEASONAL ALLERGIC RHINITIS DUE TO POLLEN: ICD-10-CM

## 2020-06-30 DIAGNOSIS — E55.9 VITAMIN D DEFICIENCY: ICD-10-CM

## 2020-06-30 PROCEDURE — 99999 PR PBB SHADOW E&M-EST. PATIENT-LVL III: CPT | Mod: PBBFAC,,, | Performed by: INTERNAL MEDICINE

## 2020-06-30 PROCEDURE — 99214 PR OFFICE/OUTPT VISIT, EST, LEVL IV, 30-39 MIN: ICD-10-PCS | Mod: S$PBB,,, | Performed by: INTERNAL MEDICINE

## 2020-06-30 PROCEDURE — 99999 PR PBB SHADOW E&M-EST. PATIENT-LVL III: ICD-10-PCS | Mod: PBBFAC,,, | Performed by: INTERNAL MEDICINE

## 2020-06-30 PROCEDURE — 99213 OFFICE O/P EST LOW 20 MIN: CPT | Mod: PBBFAC,PO | Performed by: INTERNAL MEDICINE

## 2020-06-30 PROCEDURE — 99214 OFFICE O/P EST MOD 30 MIN: CPT | Mod: S$PBB,,, | Performed by: INTERNAL MEDICINE

## 2020-06-30 RX ORDER — FLUTICASONE PROPIONATE AND SALMETEROL 250; 50 UG/1; UG/1
1 POWDER RESPIRATORY (INHALATION) 2 TIMES DAILY
Refills: 0
Start: 2020-06-30 | End: 2023-07-07 | Stop reason: SDUPTHER

## 2020-07-10 ENCOUNTER — LAB VISIT (OUTPATIENT)
Dept: LAB | Facility: HOSPITAL | Age: 75
End: 2020-07-10
Attending: INTERNAL MEDICINE
Payer: MEDICARE

## 2020-07-10 DIAGNOSIS — E03.9 ACQUIRED HYPOTHYROIDISM: ICD-10-CM

## 2020-07-10 DIAGNOSIS — E55.9 VITAMIN D DEFICIENCY: ICD-10-CM

## 2020-07-10 DIAGNOSIS — E78.5 HYPERLIPIDEMIA ASSOCIATED WITH TYPE 2 DIABETES MELLITUS: ICD-10-CM

## 2020-07-10 DIAGNOSIS — E11.69 HYPERLIPIDEMIA ASSOCIATED WITH TYPE 2 DIABETES MELLITUS: ICD-10-CM

## 2020-07-10 LAB
BASOPHILS # BLD AUTO: 0.02 K/UL (ref 0–0.2)
BASOPHILS NFR BLD: 0.4 % (ref 0–1.9)
DIFFERENTIAL METHOD: ABNORMAL
EOSINOPHIL # BLD AUTO: 0.1 K/UL (ref 0–0.5)
EOSINOPHIL NFR BLD: 1.8 % (ref 0–8)
ERYTHROCYTE [DISTWIDTH] IN BLOOD BY AUTOMATED COUNT: 15.3 % (ref 11.5–14.5)
HCT VFR BLD AUTO: 39 % (ref 37–48.5)
HGB BLD-MCNC: 12 G/DL (ref 12–16)
IMM GRANULOCYTES # BLD AUTO: 0.02 K/UL (ref 0–0.04)
IMM GRANULOCYTES NFR BLD AUTO: 0.4 % (ref 0–0.5)
LYMPHOCYTES # BLD AUTO: 1.2 K/UL (ref 1–4.8)
LYMPHOCYTES NFR BLD: 23.7 % (ref 18–48)
MCH RBC QN AUTO: 26.9 PG (ref 27–31)
MCHC RBC AUTO-ENTMCNC: 30.8 G/DL (ref 32–36)
MCV RBC AUTO: 87 FL (ref 82–98)
MONOCYTES # BLD AUTO: 0.4 K/UL (ref 0.3–1)
MONOCYTES NFR BLD: 8.5 % (ref 4–15)
NEUTROPHILS # BLD AUTO: 3.3 K/UL (ref 1.8–7.7)
NEUTROPHILS NFR BLD: 65.2 % (ref 38–73)
NRBC BLD-RTO: 0 /100 WBC
PLATELET # BLD AUTO: 190 K/UL (ref 150–350)
PMV BLD AUTO: 11.7 FL (ref 9.2–12.9)
RBC # BLD AUTO: 4.46 M/UL (ref 4–5.4)
WBC # BLD AUTO: 5.07 K/UL (ref 3.9–12.7)

## 2020-07-10 PROCEDURE — 85025 COMPLETE CBC W/AUTO DIFF WBC: CPT

## 2020-07-10 PROCEDURE — 80061 LIPID PANEL: CPT

## 2020-07-10 PROCEDURE — 84443 ASSAY THYROID STIM HORMONE: CPT

## 2020-07-10 PROCEDURE — 36415 COLL VENOUS BLD VENIPUNCTURE: CPT | Mod: PO

## 2020-07-10 PROCEDURE — 80053 COMPREHEN METABOLIC PANEL: CPT

## 2020-07-10 PROCEDURE — 82306 VITAMIN D 25 HYDROXY: CPT

## 2020-07-11 LAB
25(OH)D3+25(OH)D2 SERPL-MCNC: 40 NG/ML (ref 30–96)
ALBUMIN SERPL BCP-MCNC: 4.1 G/DL (ref 3.5–5.2)
ALP SERPL-CCNC: 90 U/L (ref 55–135)
ALT SERPL W/O P-5'-P-CCNC: 15 U/L (ref 10–44)
ANION GAP SERPL CALC-SCNC: 6 MMOL/L (ref 8–16)
AST SERPL-CCNC: 17 U/L (ref 10–40)
BILIRUB SERPL-MCNC: 0.4 MG/DL (ref 0.1–1)
BUN SERPL-MCNC: 17 MG/DL (ref 8–23)
CALCIUM SERPL-MCNC: 9.6 MG/DL (ref 8.7–10.5)
CHLORIDE SERPL-SCNC: 105 MMOL/L (ref 95–110)
CHOLEST SERPL-MCNC: 170 MG/DL (ref 120–199)
CHOLEST/HDLC SERPL: 2.7 {RATIO} (ref 2–5)
CO2 SERPL-SCNC: 30 MMOL/L (ref 23–29)
CREAT SERPL-MCNC: 1.3 MG/DL (ref 0.5–1.4)
EST. GFR  (AFRICAN AMERICAN): 46.4 ML/MIN/1.73 M^2
EST. GFR  (NON AFRICAN AMERICAN): 40.2 ML/MIN/1.73 M^2
GLUCOSE SERPL-MCNC: 135 MG/DL (ref 70–110)
HDLC SERPL-MCNC: 64 MG/DL (ref 40–75)
HDLC SERPL: 37.6 % (ref 20–50)
LDLC SERPL CALC-MCNC: 91 MG/DL (ref 63–159)
NONHDLC SERPL-MCNC: 106 MG/DL
POTASSIUM SERPL-SCNC: 4.9 MMOL/L (ref 3.5–5.1)
PROT SERPL-MCNC: 7.5 G/DL (ref 6–8.4)
SODIUM SERPL-SCNC: 141 MMOL/L (ref 136–145)
TRIGL SERPL-MCNC: 75 MG/DL (ref 30–150)
TSH SERPL DL<=0.005 MIU/L-ACNC: 2.96 UIU/ML (ref 0.4–4)

## 2020-07-12 ENCOUNTER — TELEPHONE (OUTPATIENT)
Dept: INTERNAL MEDICINE | Facility: CLINIC | Age: 75
End: 2020-07-12

## 2020-07-15 DIAGNOSIS — E11.42 CONTROLLED TYPE 2 DIABETES MELLITUS WITH DIABETIC POLYNEUROPATHY, WITHOUT LONG-TERM CURRENT USE OF INSULIN: Primary | ICD-10-CM

## 2020-07-15 RX ORDER — LANCETS
EACH MISCELLANEOUS
Qty: 200 EACH | Refills: 3 | Status: SHIPPED | OUTPATIENT
Start: 2020-07-15

## 2020-07-15 RX ORDER — INSULIN PUMP SYRINGE, 3 ML
EACH MISCELLANEOUS
Qty: 1 EACH | Refills: 0 | Status: SHIPPED | OUTPATIENT
Start: 2020-07-15 | End: 2023-11-17 | Stop reason: SDUPTHER

## 2020-07-15 NOTE — TELEPHONE ENCOUNTER
----- Message from Fabian Parikh sent at 7/15/2020 10:57 AM CDT -----  ..Type:  Patient Returning Call    Who Called: pt   Who Left Message for Patient:  Does the patient know what this is regarding?:  meter   Would the patient rather a call back or a response via [facility]?  Call back   Best Call Back Number: [phone]  Additional Information: Pt is requesting a call from nurse to discuss an order for a meter.        .  [id number] [place] - [facility] [facility]  [address] [address]  Phone: [phone] Fax: [phone]

## 2020-07-16 DIAGNOSIS — H40.1131 PRIMARY OPEN ANGLE GLAUCOMA OF BOTH EYES, MILD STAGE: ICD-10-CM

## 2020-07-16 RX ORDER — LATANOPROST 50 UG/ML
1 SOLUTION/ DROPS OPHTHALMIC NIGHTLY
Qty: 2.5 ML | Refills: 11 | Status: SHIPPED | OUTPATIENT
Start: 2020-07-16 | End: 2022-09-02 | Stop reason: SDUPTHER

## 2020-07-16 RX ORDER — TIMOLOL MALEATE 5 MG/ML
1 SOLUTION/ DROPS OPHTHALMIC 2 TIMES DAILY
Qty: 10 ML | Refills: 6 | Status: SHIPPED | OUTPATIENT
Start: 2020-07-16 | End: 2022-11-10

## 2020-07-16 NOTE — TELEPHONE ENCOUNTER
----- Message from Gloria Mendez sent at 7/16/2020 11:34 AM CDT -----  Need refill on ketorolac 0.5% (ACULAR) 0.5 % Drop pls call [phone] pls contact [phone]

## 2020-07-28 DIAGNOSIS — F41.9 ANXIETY: ICD-10-CM

## 2020-07-28 RX ORDER — SERTRALINE HYDROCHLORIDE 50 MG/1
75 TABLET, FILM COATED ORAL NIGHTLY
Qty: 135 TABLET | Refills: 3 | Status: SHIPPED | OUTPATIENT
Start: 2020-07-28 | End: 2021-08-13 | Stop reason: SDUPTHER

## 2020-07-28 NOTE — TELEPHONE ENCOUNTER
----- Message from Viridiana Herrera sent at 7/28/2020 10:32 AM CDT -----  Regarding: refill  Contact: pt  1. What is the name of the medication you are requesting? sertraline  2. What is the dose? .  3. How do you take the medication? Orally, topically, etc? .  4. How often do you take this medication? .  5. Do you need a 30 day or 90 day supply?.  6. How many refills are you requesting? .  7. What is your preferred pharmacy and location of the pharmacy? [facility]  8. Who can we contact with further questions? [phone]

## 2020-10-21 RX ORDER — POTASSIUM CHLORIDE 20 MEQ/1
20 TABLET, EXTENDED RELEASE ORAL DAILY
Qty: 90 TABLET | Refills: 3 | Status: SHIPPED | OUTPATIENT
Start: 2020-10-21 | End: 2021-10-25 | Stop reason: SDUPTHER

## 2020-10-29 ENCOUNTER — IMMUNIZATION (OUTPATIENT)
Dept: PHARMACY | Facility: CLINIC | Age: 75
End: 2020-10-29
Payer: MEDICARE

## 2020-11-03 ENCOUNTER — TELEPHONE (OUTPATIENT)
Dept: FAMILY MEDICINE | Facility: CLINIC | Age: 75
End: 2020-11-03

## 2021-01-06 ENCOUNTER — OFFICE VISIT (OUTPATIENT)
Dept: FAMILY MEDICINE | Facility: CLINIC | Age: 76
End: 2021-01-06
Payer: MEDICARE

## 2021-01-06 ENCOUNTER — LAB VISIT (OUTPATIENT)
Dept: LAB | Facility: HOSPITAL | Age: 76
End: 2021-01-06
Attending: INTERNAL MEDICINE
Payer: MEDICARE

## 2021-01-06 VITALS
DIASTOLIC BLOOD PRESSURE: 70 MMHG | HEART RATE: 82 BPM | WEIGHT: 141.75 LBS | HEIGHT: 62 IN | SYSTOLIC BLOOD PRESSURE: 129 MMHG | OXYGEN SATURATION: 96 % | TEMPERATURE: 98 F | BODY MASS INDEX: 26.09 KG/M2

## 2021-01-06 DIAGNOSIS — K59.00 CONSTIPATION, UNSPECIFIED CONSTIPATION TYPE: ICD-10-CM

## 2021-01-06 DIAGNOSIS — Z29.9 PREVENTIVE MEASURE: ICD-10-CM

## 2021-01-06 DIAGNOSIS — E11.9 TYPE 2 DIABETES MELLITUS WITHOUT COMPLICATION: ICD-10-CM

## 2021-01-06 DIAGNOSIS — E11.69 HYPERLIPIDEMIA ASSOCIATED WITH TYPE 2 DIABETES MELLITUS: ICD-10-CM

## 2021-01-06 DIAGNOSIS — E11.42 CONTROLLED TYPE 2 DIABETES MELLITUS WITH DIABETIC POLYNEUROPATHY, WITHOUT LONG-TERM CURRENT USE OF INSULIN: ICD-10-CM

## 2021-01-06 DIAGNOSIS — F41.9 ANXIETY: ICD-10-CM

## 2021-01-06 DIAGNOSIS — E55.9 VITAMIN D DEFICIENCY: ICD-10-CM

## 2021-01-06 DIAGNOSIS — E11.59 HYPERTENSION ASSOCIATED WITH DIABETES: ICD-10-CM

## 2021-01-06 DIAGNOSIS — J45.20 MILD INTERMITTENT ASTHMA WITHOUT COMPLICATION: ICD-10-CM

## 2021-01-06 DIAGNOSIS — E78.5 HYPERLIPIDEMIA ASSOCIATED WITH TYPE 2 DIABETES MELLITUS: ICD-10-CM

## 2021-01-06 DIAGNOSIS — I15.2 HYPERTENSION ASSOCIATED WITH DIABETES: ICD-10-CM

## 2021-01-06 DIAGNOSIS — E03.9 ACQUIRED HYPOTHYROIDISM: Primary | ICD-10-CM

## 2021-01-06 PROCEDURE — 83036 HEMOGLOBIN GLYCOSYLATED A1C: CPT

## 2021-01-06 PROCEDURE — 99999 PR PBB SHADOW E&M-EST. PATIENT-LVL V: ICD-10-PCS | Mod: PBBFAC,,, | Performed by: INTERNAL MEDICINE

## 2021-01-06 PROCEDURE — 99999 PR PBB SHADOW E&M-EST. PATIENT-LVL V: CPT | Mod: PBBFAC,,, | Performed by: INTERNAL MEDICINE

## 2021-01-06 PROCEDURE — 99214 OFFICE O/P EST MOD 30 MIN: CPT | Mod: S$PBB,,, | Performed by: INTERNAL MEDICINE

## 2021-01-06 PROCEDURE — 36415 COLL VENOUS BLD VENIPUNCTURE: CPT | Mod: PO

## 2021-01-06 PROCEDURE — 99214 PR OFFICE/OUTPT VISIT, EST, LEVL IV, 30-39 MIN: ICD-10-PCS | Mod: S$PBB,,, | Performed by: INTERNAL MEDICINE

## 2021-01-06 PROCEDURE — 99215 OFFICE O/P EST HI 40 MIN: CPT | Mod: PBBFAC,PO | Performed by: INTERNAL MEDICINE

## 2021-01-06 RX ORDER — LEVOTHYROXINE SODIUM 25 UG/1
25 TABLET ORAL DAILY
Qty: 30 TABLET | Refills: 11 | Status: SHIPPED | OUTPATIENT
Start: 2021-01-06 | End: 2022-04-08

## 2021-01-06 RX ORDER — METFORMIN HYDROCHLORIDE 500 MG/1
500 TABLET ORAL 2 TIMES DAILY WITH MEALS
Qty: 60 TABLET | Refills: 11 | Status: SHIPPED | OUTPATIENT
Start: 2021-01-06 | End: 2021-07-12 | Stop reason: SDUPTHER

## 2021-01-06 RX ORDER — POLYETHYLENE GLYCOL 3350 17 G/17G
POWDER, FOR SOLUTION ORAL
Qty: 1 BOTTLE | Refills: 3 | Status: SHIPPED | OUTPATIENT
Start: 2021-01-06 | End: 2021-07-12 | Stop reason: SDUPTHER

## 2021-01-07 ENCOUNTER — TELEPHONE (OUTPATIENT)
Dept: FAMILY MEDICINE | Facility: CLINIC | Age: 76
End: 2021-01-07

## 2021-01-07 LAB
ESTIMATED AVG GLUCOSE: 160 MG/DL (ref 68–131)
HBA1C MFR BLD HPLC: 7.2 % (ref 4–5.6)

## 2021-01-09 ENCOUNTER — IMMUNIZATION (OUTPATIENT)
Dept: INTERNAL MEDICINE | Facility: CLINIC | Age: 76
End: 2021-01-09
Payer: MEDICARE

## 2021-01-09 DIAGNOSIS — Z23 NEED FOR VACCINATION: ICD-10-CM

## 2021-01-09 PROCEDURE — 91300 COVID-19, MRNA, LNP-S, PF, 30 MCG/0.3 ML DOSE VACCINE: CPT | Mod: PBBFAC | Performed by: FAMILY MEDICINE

## 2021-01-30 ENCOUNTER — IMMUNIZATION (OUTPATIENT)
Dept: INTERNAL MEDICINE | Facility: CLINIC | Age: 76
End: 2021-01-30
Payer: MEDICARE

## 2021-01-30 DIAGNOSIS — Z23 NEED FOR VACCINATION: Primary | ICD-10-CM

## 2021-01-30 PROCEDURE — 0002A COVID-19, MRNA, LNP-S, PF, 30 MCG/0.3 ML DOSE VACCINE: CPT | Mod: PBBFAC

## 2021-01-30 PROCEDURE — 91300 COVID-19, MRNA, LNP-S, PF, 30 MCG/0.3 ML DOSE VACCINE: CPT | Mod: PBBFAC

## 2021-04-22 RX ORDER — ROSUVASTATIN CALCIUM 10 MG/1
10 TABLET, COATED ORAL DAILY
Qty: 30 TABLET | Refills: 11 | Status: SHIPPED | OUTPATIENT
Start: 2021-04-22 | End: 2022-05-02

## 2021-06-30 ENCOUNTER — LAB VISIT (OUTPATIENT)
Dept: LAB | Facility: HOSPITAL | Age: 76
End: 2021-06-30
Attending: INTERNAL MEDICINE
Payer: MEDICARE

## 2021-06-30 DIAGNOSIS — E03.9 ACQUIRED HYPOTHYROIDISM: ICD-10-CM

## 2021-06-30 DIAGNOSIS — E11.42 CONTROLLED TYPE 2 DIABETES MELLITUS WITH DIABETIC POLYNEUROPATHY, WITHOUT LONG-TERM CURRENT USE OF INSULIN: ICD-10-CM

## 2021-06-30 DIAGNOSIS — E11.69 HYPERLIPIDEMIA ASSOCIATED WITH TYPE 2 DIABETES MELLITUS: ICD-10-CM

## 2021-06-30 DIAGNOSIS — E55.9 VITAMIN D DEFICIENCY: ICD-10-CM

## 2021-06-30 DIAGNOSIS — Z29.9 PREVENTIVE MEASURE: ICD-10-CM

## 2021-06-30 DIAGNOSIS — E78.5 HYPERLIPIDEMIA ASSOCIATED WITH TYPE 2 DIABETES MELLITUS: ICD-10-CM

## 2021-06-30 LAB
ALBUMIN SERPL BCP-MCNC: 3.9 G/DL (ref 3.5–5.2)
ALP SERPL-CCNC: 74 U/L (ref 55–135)
ALT SERPL W/O P-5'-P-CCNC: 16 U/L (ref 10–44)
ANION GAP SERPL CALC-SCNC: 9 MMOL/L (ref 8–16)
AST SERPL-CCNC: 18 U/L (ref 10–40)
BASOPHILS # BLD AUTO: 0.03 K/UL (ref 0–0.2)
BASOPHILS NFR BLD: 0.6 % (ref 0–1.9)
BILIRUB SERPL-MCNC: 0.4 MG/DL (ref 0.1–1)
BUN SERPL-MCNC: 17 MG/DL (ref 8–23)
CALCIUM SERPL-MCNC: 9.3 MG/DL (ref 8.7–10.5)
CHLORIDE SERPL-SCNC: 101 MMOL/L (ref 95–110)
CHOLEST SERPL-MCNC: 139 MG/DL (ref 120–199)
CHOLEST/HDLC SERPL: 2.6 {RATIO} (ref 2–5)
CO2 SERPL-SCNC: 26 MMOL/L (ref 23–29)
CREAT SERPL-MCNC: 1.2 MG/DL (ref 0.5–1.4)
DIFFERENTIAL METHOD: ABNORMAL
EOSINOPHIL # BLD AUTO: 0.1 K/UL (ref 0–0.5)
EOSINOPHIL NFR BLD: 2.8 % (ref 0–8)
ERYTHROCYTE [DISTWIDTH] IN BLOOD BY AUTOMATED COUNT: 14.6 % (ref 11.5–14.5)
EST. GFR  (AFRICAN AMERICAN): 50.7 ML/MIN/1.73 M^2
EST. GFR  (NON AFRICAN AMERICAN): 44 ML/MIN/1.73 M^2
ESTIMATED AVG GLUCOSE: 166 MG/DL (ref 68–131)
GLUCOSE SERPL-MCNC: 134 MG/DL (ref 70–110)
HBA1C MFR BLD: 7.4 % (ref 4–5.6)
HCT VFR BLD AUTO: 36.8 % (ref 37–48.5)
HDLC SERPL-MCNC: 53 MG/DL (ref 40–75)
HDLC SERPL: 38.1 % (ref 20–50)
HGB BLD-MCNC: 12.1 G/DL (ref 12–16)
IMM GRANULOCYTES # BLD AUTO: 0.01 K/UL (ref 0–0.04)
IMM GRANULOCYTES NFR BLD AUTO: 0.2 % (ref 0–0.5)
LDLC SERPL CALC-MCNC: 69.8 MG/DL (ref 63–159)
LYMPHOCYTES # BLD AUTO: 1 K/UL (ref 1–4.8)
LYMPHOCYTES NFR BLD: 21.2 % (ref 18–48)
MCH RBC QN AUTO: 27.3 PG (ref 27–31)
MCHC RBC AUTO-ENTMCNC: 32.9 G/DL (ref 32–36)
MCV RBC AUTO: 83 FL (ref 82–98)
MONOCYTES # BLD AUTO: 0.4 K/UL (ref 0.3–1)
MONOCYTES NFR BLD: 8.1 % (ref 4–15)
NEUTROPHILS # BLD AUTO: 3.1 K/UL (ref 1.8–7.7)
NEUTROPHILS NFR BLD: 67.1 % (ref 38–73)
NONHDLC SERPL-MCNC: 86 MG/DL
NRBC BLD-RTO: 0 /100 WBC
PLATELET # BLD AUTO: 194 K/UL (ref 150–450)
PMV BLD AUTO: 11.6 FL (ref 9.2–12.9)
POTASSIUM SERPL-SCNC: 4.2 MMOL/L (ref 3.5–5.1)
PROT SERPL-MCNC: 7.4 G/DL (ref 6–8.4)
RBC # BLD AUTO: 4.43 M/UL (ref 4–5.4)
SODIUM SERPL-SCNC: 136 MMOL/L (ref 136–145)
TRIGL SERPL-MCNC: 81 MG/DL (ref 30–150)
TSH SERPL DL<=0.005 MIU/L-ACNC: 3.02 UIU/ML (ref 0.4–4)
WBC # BLD AUTO: 4.67 K/UL (ref 3.9–12.7)

## 2021-06-30 PROCEDURE — 80053 COMPREHEN METABOLIC PANEL: CPT | Performed by: INTERNAL MEDICINE

## 2021-06-30 PROCEDURE — 82306 VITAMIN D 25 HYDROXY: CPT | Performed by: INTERNAL MEDICINE

## 2021-06-30 PROCEDURE — 85025 COMPLETE CBC W/AUTO DIFF WBC: CPT | Performed by: INTERNAL MEDICINE

## 2021-06-30 PROCEDURE — 84443 ASSAY THYROID STIM HORMONE: CPT | Performed by: INTERNAL MEDICINE

## 2021-06-30 PROCEDURE — 80061 LIPID PANEL: CPT | Performed by: INTERNAL MEDICINE

## 2021-06-30 PROCEDURE — 83036 HEMOGLOBIN GLYCOSYLATED A1C: CPT | Performed by: INTERNAL MEDICINE

## 2021-06-30 PROCEDURE — 36415 COLL VENOUS BLD VENIPUNCTURE: CPT | Mod: PO | Performed by: INTERNAL MEDICINE

## 2021-07-01 LAB — 25(OH)D3+25(OH)D2 SERPL-MCNC: 84 NG/ML (ref 30–96)

## 2021-07-12 ENCOUNTER — OFFICE VISIT (OUTPATIENT)
Dept: FAMILY MEDICINE | Facility: CLINIC | Age: 76
End: 2021-07-12
Payer: MEDICARE

## 2021-07-12 VITALS
HEIGHT: 62 IN | TEMPERATURE: 97 F | DIASTOLIC BLOOD PRESSURE: 68 MMHG | WEIGHT: 141.75 LBS | HEART RATE: 93 BPM | SYSTOLIC BLOOD PRESSURE: 124 MMHG | BODY MASS INDEX: 26.09 KG/M2 | OXYGEN SATURATION: 97 %

## 2021-07-12 DIAGNOSIS — E11.42 CONTROLLED TYPE 2 DIABETES MELLITUS WITH DIABETIC POLYNEUROPATHY, WITHOUT LONG-TERM CURRENT USE OF INSULIN: Primary | ICD-10-CM

## 2021-07-12 DIAGNOSIS — E55.9 VITAMIN D DEFICIENCY: ICD-10-CM

## 2021-07-12 DIAGNOSIS — E03.9 ACQUIRED HYPOTHYROIDISM: ICD-10-CM

## 2021-07-12 DIAGNOSIS — I15.2 HYPERTENSION ASSOCIATED WITH DIABETES: ICD-10-CM

## 2021-07-12 DIAGNOSIS — E11.59 HYPERTENSION ASSOCIATED WITH DIABETES: ICD-10-CM

## 2021-07-12 DIAGNOSIS — E11.69 HYPERLIPIDEMIA ASSOCIATED WITH TYPE 2 DIABETES MELLITUS: ICD-10-CM

## 2021-07-12 DIAGNOSIS — F41.9 ANXIETY: ICD-10-CM

## 2021-07-12 DIAGNOSIS — Z00.00 ENCOUNTER FOR PREVENTIVE HEALTH EXAMINATION: ICD-10-CM

## 2021-07-12 DIAGNOSIS — K59.00 CONSTIPATION, UNSPECIFIED CONSTIPATION TYPE: ICD-10-CM

## 2021-07-12 DIAGNOSIS — E78.5 HYPERLIPIDEMIA ASSOCIATED WITH TYPE 2 DIABETES MELLITUS: ICD-10-CM

## 2021-07-12 PROCEDURE — 99999 PR PBB SHADOW E&M-EST. PATIENT-LVL V: ICD-10-PCS | Mod: PBBFAC,,, | Performed by: INTERNAL MEDICINE

## 2021-07-12 PROCEDURE — 99214 OFFICE O/P EST MOD 30 MIN: CPT | Mod: S$PBB,,, | Performed by: INTERNAL MEDICINE

## 2021-07-12 PROCEDURE — 99999 PR PBB SHADOW E&M-EST. PATIENT-LVL V: CPT | Mod: PBBFAC,,, | Performed by: INTERNAL MEDICINE

## 2021-07-12 PROCEDURE — 99214 PR OFFICE/OUTPT VISIT, EST, LEVL IV, 30-39 MIN: ICD-10-PCS | Mod: S$PBB,,, | Performed by: INTERNAL MEDICINE

## 2021-07-12 PROCEDURE — 99215 OFFICE O/P EST HI 40 MIN: CPT | Mod: PBBFAC,PO | Performed by: INTERNAL MEDICINE

## 2021-07-12 RX ORDER — AZELASTINE 1 MG/ML
2 SPRAY, METERED NASAL 2 TIMES DAILY
COMMUNITY
Start: 2021-06-07

## 2021-07-12 RX ORDER — POLYETHYLENE GLYCOL 3350 17 G/17G
POWDER, FOR SOLUTION ORAL
Qty: 510 G | Refills: 3 | Status: SHIPPED | OUTPATIENT
Start: 2021-07-12 | End: 2022-11-10 | Stop reason: SDUPTHER

## 2021-07-12 RX ORDER — METFORMIN HYDROCHLORIDE 500 MG/1
500 TABLET ORAL 2 TIMES DAILY WITH MEALS
Qty: 180 TABLET | Refills: 3 | Status: SHIPPED | OUTPATIENT
Start: 2021-07-12 | End: 2022-07-13 | Stop reason: SDUPTHER

## 2021-07-12 RX ORDER — BENZONATATE 100 MG/1
CAPSULE ORAL
COMMUNITY
Start: 2021-07-08

## 2021-07-14 ENCOUNTER — PATIENT OUTREACH (OUTPATIENT)
Dept: ADMINISTRATIVE | Facility: HOSPITAL | Age: 76
End: 2021-07-14

## 2021-10-08 ENCOUNTER — IMMUNIZATION (OUTPATIENT)
Dept: PRIMARY CARE CLINIC | Facility: CLINIC | Age: 76
End: 2021-10-08
Payer: MEDICARE

## 2021-10-08 DIAGNOSIS — Z23 NEED FOR VACCINATION: Primary | ICD-10-CM

## 2021-10-08 PROCEDURE — 0003A COVID-19, MRNA, LNP-S, PF, 30 MCG/0.3 ML DOSE VACCINE: CPT | Mod: CV19,PBBFAC | Performed by: FAMILY MEDICINE

## 2021-10-08 PROCEDURE — 91300 COVID-19, MRNA, LNP-S, PF, 30 MCG/0.3 ML DOSE VACCINE: CPT | Mod: PBBFAC | Performed by: FAMILY MEDICINE

## 2022-01-04 NOTE — PROGRESS NOTES
"Subjective:      Patient ID: Natalia Mckeon is a 76 y.o. female.    Chief Complaint: Follow-up      HPI  Here for follow up of medical problems.  Walking for exercise at least twice a week.  Lost 5# purposeful.  Energy Energy good.  No f/c/sw/cough.  No cp/sob/palp.  BMs normal.  Stress doing ok,  has been ill.    Updated/ annual due 7/22:  HM: 10/21 fluvax, 1/21 covid vaccines/booster, 7/15 [id number], 2/18 booster rtxiaz12, 6/14 TDaP, 10/14 zostervax at pharmacy, 2019 Shingrix x2, 7/21 mmg/Gyn Dr. Obrien, bmd per her, 3/19 Cscope Dr. Auguste rep 5y, 5/20 Eye Dr. Phillip, 11/16 HCV neg, Cards Dr. Jm Vasquez.     Review of Systems   Constitutional: Negative for chills, diaphoresis and fever.   Respiratory: Negative for cough and shortness of breath.    Cardiovascular: Negative for chest pain, palpitations and leg swelling.   Gastrointestinal: Negative for blood in stool, constipation, diarrhea, nausea and vomiting.   Genitourinary: Negative for dysuria, frequency and hematuria.   Psychiatric/Behavioral: The patient is not nervous/anxious.          Objective:   /78 (BP Location: Left arm, Patient Position: Sitting)   Pulse 71   Temp 97.8 °F (36.6 °C) (Temporal)   Resp 18   Ht 5' 2" (1.575 m)   Wt 62 kg (136 lb 11 oz)   SpO2 98%   BMI 25.00 kg/m²     Physical Exam  Constitutional:       Appearance: She is well-developed.   HENT:      Mouth/Throat:      Mouth: Oropharynx is clear and moist.   Neck:      Thyroid: No thyroid mass.      Vascular: No carotid bruit.   Cardiovascular:      Rate and Rhythm: Normal rate and regular rhythm.      Pulses: Intact distal pulses.      Heart sounds: No murmur heard.  No friction rub. No gallop.    Pulmonary:      Effort: Pulmonary effort is normal.      Breath sounds: Normal breath sounds. No wheezing or rales.   Abdominal:      General: Bowel sounds are normal.      Palpations: Abdomen is soft. There is no hepatosplenomegaly or mass.      Tenderness: There " is no abdominal tenderness.   Musculoskeletal:         General: No edema.      Cervical back: Neck supple.   Lymphadenopathy:      Cervical: No cervical adenopathy.   Neurological:      Mental Status: She is alert and oriented to person, place, and time.   Psychiatric:         Mood and Affect: Mood and affect normal.       Results for OTILIO PEOPLES (MRN [id number]) as of 1/12/2022 10:56   Ref. Range 1/6/2021 14:57 6/30/2021 08:25 6/30/2021 11:24 1/10/2022 12:56   Hemoglobin A1C External Latest Ref Range: 4.0 - 5.6 % 7.2 (H) 7.4 (H)  7.4 (H)   Estimated Avg Glucose Latest Ref Range: 68 - 131 mg/dL 160 (H) 166 (H)  166 (H)         Assessment:       1. Controlled type 2 diabetes mellitus with diabetic polyneuropathy, without long-term current use of insulin    2. Hypertension associated with diabetes    3. Anxiety    4. Mild intermittent asthma without complication    5. Preventive measure    6. Hyperlipidemia associated with type 2 diabetes mellitus    7. Acquired hypothyroidism          Plan:     Controlled type 2 diabetes mellitus with diabetic polyneuropathy, without long-term current use of insulin  -     TSH; Future; Expected date: 01/12/2022  -     Hemoglobin A1C; Future; Expected date: 01/12/2022  -     Microalbumin/Creatinine Ratio, Urine; Future; Expected date: 01/12/2022    Hypertension associated with diabetes- stable, cont rx.    Anxiety    Mild intermittent asthma without complication- doing well.    Preventive measure  -     CBC Auto Differential; Future; Expected date: 01/12/2022  -     Comprehensive Metabolic Panel; Future; Expected date: 01/12/2022  -     Lipid Panel; Future; Expected date: 01/12/2022  -     TSH; Future; Expected date: 01/12/2022  -     Hemoglobin A1C; Future; Expected date: 01/12/2022  -     Microalbumin/Creatinine Ratio, Urine; Future; Expected date: 01/12/2022    Hyperlipidemia associated with type 2 diabetes mellitus  -     CBC Auto Differential; Future; Expected date:  01/12/2022  -     Comprehensive Metabolic Panel; Future; Expected date: 01/12/2022  -     Lipid Panel; Future; Expected date: 01/12/2022    Acquired hypothyroidism  -     TSH; Future; Expected date: 01/12/2022    All doing well, cont meds, RTC 6mo with labs prior.

## 2022-01-10 ENCOUNTER — LAB VISIT (OUTPATIENT)
Dept: LAB | Facility: HOSPITAL | Age: 77
End: 2022-01-10
Attending: INTERNAL MEDICINE
Payer: MEDICARE

## 2022-01-10 DIAGNOSIS — E11.42 CONTROLLED TYPE 2 DIABETES MELLITUS WITH DIABETIC POLYNEUROPATHY, WITHOUT LONG-TERM CURRENT USE OF INSULIN: ICD-10-CM

## 2022-01-10 LAB
ESTIMATED AVG GLUCOSE: 166 MG/DL (ref 68–131)
HBA1C MFR BLD: 7.4 % (ref 4–5.6)

## 2022-01-10 PROCEDURE — 36415 COLL VENOUS BLD VENIPUNCTURE: CPT | Performed by: INTERNAL MEDICINE

## 2022-01-10 PROCEDURE — 83036 HEMOGLOBIN GLYCOSYLATED A1C: CPT | Performed by: INTERNAL MEDICINE

## 2022-01-12 ENCOUNTER — OFFICE VISIT (OUTPATIENT)
Dept: FAMILY MEDICINE | Facility: CLINIC | Age: 77
End: 2022-01-12
Payer: MEDICARE

## 2022-01-12 VITALS
HEIGHT: 62 IN | HEART RATE: 71 BPM | DIASTOLIC BLOOD PRESSURE: 78 MMHG | SYSTOLIC BLOOD PRESSURE: 132 MMHG | RESPIRATION RATE: 18 BRPM | TEMPERATURE: 98 F | OXYGEN SATURATION: 98 % | WEIGHT: 136.69 LBS | BODY MASS INDEX: 25.15 KG/M2

## 2022-01-12 DIAGNOSIS — E11.59 HYPERTENSION ASSOCIATED WITH DIABETES: ICD-10-CM

## 2022-01-12 DIAGNOSIS — I15.2 HYPERTENSION ASSOCIATED WITH DIABETES: ICD-10-CM

## 2022-01-12 DIAGNOSIS — E11.69 HYPERLIPIDEMIA ASSOCIATED WITH TYPE 2 DIABETES MELLITUS: ICD-10-CM

## 2022-01-12 DIAGNOSIS — E03.9 ACQUIRED HYPOTHYROIDISM: ICD-10-CM

## 2022-01-12 DIAGNOSIS — Z29.9 PREVENTIVE MEASURE: ICD-10-CM

## 2022-01-12 DIAGNOSIS — F41.9 ANXIETY: ICD-10-CM

## 2022-01-12 DIAGNOSIS — E78.5 HYPERLIPIDEMIA ASSOCIATED WITH TYPE 2 DIABETES MELLITUS: ICD-10-CM

## 2022-01-12 DIAGNOSIS — J45.20 MILD INTERMITTENT ASTHMA WITHOUT COMPLICATION: ICD-10-CM

## 2022-01-12 DIAGNOSIS — E11.42 CONTROLLED TYPE 2 DIABETES MELLITUS WITH DIABETIC POLYNEUROPATHY, WITHOUT LONG-TERM CURRENT USE OF INSULIN: Primary | ICD-10-CM

## 2022-01-12 PROCEDURE — 99214 OFFICE O/P EST MOD 30 MIN: CPT | Mod: S$PBB,,, | Performed by: INTERNAL MEDICINE

## 2022-01-12 PROCEDURE — 99999 PR PBB SHADOW E&M-EST. PATIENT-LVL V: CPT | Mod: PBBFAC,,, | Performed by: INTERNAL MEDICINE

## 2022-01-12 PROCEDURE — 99999 PR PBB SHADOW E&M-EST. PATIENT-LVL V: ICD-10-PCS | Mod: PBBFAC,,, | Performed by: INTERNAL MEDICINE

## 2022-01-12 PROCEDURE — 99214 PR OFFICE/OUTPT VISIT, EST, LEVL IV, 30-39 MIN: ICD-10-PCS | Mod: S$PBB,,, | Performed by: INTERNAL MEDICINE

## 2022-01-12 PROCEDURE — 99215 OFFICE O/P EST HI 40 MIN: CPT | Mod: PBBFAC,PO | Performed by: INTERNAL MEDICINE

## 2022-01-24 ENCOUNTER — PES CALL (OUTPATIENT)
Dept: ADMINISTRATIVE | Facility: CLINIC | Age: 77
End: 2022-01-24
Payer: MEDICARE

## 2022-03-01 ENCOUNTER — OFFICE VISIT (OUTPATIENT)
Dept: OPHTHALMOLOGY | Facility: CLINIC | Age: 77
End: 2022-03-01
Payer: MEDICARE

## 2022-03-01 DIAGNOSIS — H40.1131 PRIMARY OPEN ANGLE GLAUCOMA OF BOTH EYES, MILD STAGE: ICD-10-CM

## 2022-03-01 DIAGNOSIS — E11.3553 TYPE 2 DIABETES MELLITUS WITH STABLE PROLIFERATIVE RETINOPATHY OF BOTH EYES, WITHOUT LONG-TERM CURRENT USE OF INSULIN: Primary | ICD-10-CM

## 2022-03-01 DIAGNOSIS — E11.36 DIABETIC CATARACT OF BOTH EYES: ICD-10-CM

## 2022-03-01 PROCEDURE — 99999 PR PBB SHADOW E&M-EST. PATIENT-LVL IV: CPT | Mod: PBBFAC,,, | Performed by: OPHTHALMOLOGY

## 2022-03-01 PROCEDURE — 92134 CPTRZ OPH DX IMG PST SGM RTA: CPT | Mod: PBBFAC | Performed by: OPHTHALMOLOGY

## 2022-03-01 PROCEDURE — 92134 POSTERIOR SEGMENT OCT RETINA (OCULAR COHERENCE TOMOGRAPHY)-BOTH EYES: ICD-10-PCS | Mod: 26,S$PBB,, | Performed by: OPHTHALMOLOGY

## 2022-03-01 PROCEDURE — 99213 PR OFFICE/OUTPT VISIT, EST, LEVL III, 20-29 MIN: ICD-10-PCS | Mod: S$PBB,,, | Performed by: OPHTHALMOLOGY

## 2022-03-01 PROCEDURE — 99999 PR PBB SHADOW E&M-EST. PATIENT-LVL IV: ICD-10-PCS | Mod: PBBFAC,,, | Performed by: OPHTHALMOLOGY

## 2022-03-01 PROCEDURE — 92133 CPTRZD OPH DX IMG PST SGM ON: CPT | Mod: PBBFAC | Performed by: OPHTHALMOLOGY

## 2022-03-01 PROCEDURE — 99213 OFFICE O/P EST LOW 20 MIN: CPT | Mod: S$PBB,,, | Performed by: OPHTHALMOLOGY

## 2022-03-01 PROCEDURE — 99214 OFFICE O/P EST MOD 30 MIN: CPT | Mod: PBBFAC | Performed by: OPHTHALMOLOGY

## 2022-03-01 NOTE — PROGRESS NOTES
===============================  Date today is 3/1/2022  Natalia Mckeon is a 77 y.o. female  Last visit [facility]: :1/2/2018   Last visit eye dept. Visit date not found    Uncorrected distance visual acuity was 20/50 in the right eye and 20/40 in the left eye.  Tonometry     Tonometry (Applanation, 1:13 PM)       Right Left    Pressure 22 22          Target Pressure       Right Left    Target 18 18              Not recorded       Manifest Refraction     Manifest Refraction       Sphere Cylinder Glidden Dist VA    Right -1.50 +0.75 035 20/20    Left -1.00 Sphere  20/25              Not recorded       Chief Complaint   Patient presents with    Diabetic Eye Exam     Pt states she is here for dm eye exam / needs new glasses, lost old ones     HPI     Diabetic Eye Exam      Additional comments: Pt states she is here for dm eye exam / needs new   glasses, lost old ones              Comments     DM SINCE 2006  BDR  H/o DME OD  VMA  COAG (NEWLY DX 3/2018)  +FAMHX-GLAUCOMA-BROTHER      Latanoprost caused redness  Timolol caused redness          Last edited by KITTY Philip on 3/1/2022  1:14 PM. (History)      Problem List Items Addressed This Visit        Eye/Vision problems    Primary open angle glaucoma of both eyes, mild stage    Relevant Orders    Posterior Segment OCT Optic Nerve- Both eyes (Completed)      Other Visit Diagnoses     Type 2 diabetes mellitus with stable proliferative retinopathy of both eyes, without long-term current use of insulin    -  Primary    Relevant Orders    Posterior Segment OCT Retina-Both eyes (Completed)    Diabetic cataract of both eyes              ________________  3/1/2022 today    DM no active retinopathy  1+ cortical cataract OU  C/d 0.55 OD  RNFL normal OU  IOP 22 OU today  Will refer back to [facility] next visit  Continue drops  Will order a VF for next visit    RTC 3-4 months 24-2 VF with [facility] to resume glaucoma care  Instructed to call 24/7 for any worsening of vision or symptoms.  Check OU daily.   Gave my office and cell phone number.        ===============================

## 2022-03-02 ENCOUNTER — TELEPHONE (OUTPATIENT)
Dept: ADMINISTRATIVE | Facility: HOSPITAL | Age: 77
End: 2022-03-02
Payer: MEDICARE

## 2022-03-02 NOTE — TELEPHONE ENCOUNTER
No new care gaps identified.  Powered by [employer]. Reference number: [id number].   3/02/2022 10:57:54 AM CST

## 2022-03-04 ENCOUNTER — TELEPHONE (OUTPATIENT)
Dept: ADMINISTRATIVE | Facility: HOSPITAL | Age: 77
End: 2022-03-04
Payer: MEDICARE

## 2022-03-09 ENCOUNTER — OFFICE VISIT (OUTPATIENT)
Dept: FAMILY MEDICINE | Facility: CLINIC | Age: 77
End: 2022-03-09
Payer: MEDICARE

## 2022-03-09 VITALS
DIASTOLIC BLOOD PRESSURE: 72 MMHG | BODY MASS INDEX: 25.8 KG/M2 | WEIGHT: 140.19 LBS | SYSTOLIC BLOOD PRESSURE: 154 MMHG | TEMPERATURE: 97 F | HEIGHT: 62 IN | RESPIRATION RATE: 18 BRPM | HEART RATE: 64 BPM | OXYGEN SATURATION: 99 %

## 2022-03-09 DIAGNOSIS — H40.1131 PRIMARY OPEN ANGLE GLAUCOMA OF BOTH EYES, MILD STAGE: ICD-10-CM

## 2022-03-09 DIAGNOSIS — K21.9 GASTROESOPHAGEAL REFLUX DISEASE, UNSPECIFIED WHETHER ESOPHAGITIS PRESENT: ICD-10-CM

## 2022-03-09 DIAGNOSIS — E55.9 VITAMIN D DEFICIENCY: ICD-10-CM

## 2022-03-09 DIAGNOSIS — N39.0 RECURRENT UTI: ICD-10-CM

## 2022-03-09 DIAGNOSIS — E11.69 HYPERLIPIDEMIA ASSOCIATED WITH TYPE 2 DIABETES MELLITUS: ICD-10-CM

## 2022-03-09 DIAGNOSIS — J45.20 MILD INTERMITTENT ASTHMA WITHOUT COMPLICATION: ICD-10-CM

## 2022-03-09 DIAGNOSIS — E78.5 HYPERLIPIDEMIA ASSOCIATED WITH TYPE 2 DIABETES MELLITUS: ICD-10-CM

## 2022-03-09 DIAGNOSIS — E11.59 HYPERTENSION ASSOCIATED WITH DIABETES: ICD-10-CM

## 2022-03-09 DIAGNOSIS — F41.9 ANXIETY: ICD-10-CM

## 2022-03-09 DIAGNOSIS — E11.42 CONTROLLED TYPE 2 DIABETES MELLITUS WITH DIABETIC POLYNEUROPATHY, WITHOUT LONG-TERM CURRENT USE OF INSULIN: ICD-10-CM

## 2022-03-09 DIAGNOSIS — N18.31 STAGE 3A CHRONIC KIDNEY DISEASE: ICD-10-CM

## 2022-03-09 DIAGNOSIS — Z00.00 ENCOUNTER FOR PREVENTIVE HEALTH EXAMINATION: Primary | ICD-10-CM

## 2022-03-09 DIAGNOSIS — Z86.010 HISTORY OF COLON POLYPS: ICD-10-CM

## 2022-03-09 DIAGNOSIS — E03.9 ACQUIRED HYPOTHYROIDISM: ICD-10-CM

## 2022-03-09 DIAGNOSIS — I15.2 HYPERTENSION ASSOCIATED WITH DIABETES: ICD-10-CM

## 2022-03-09 PROBLEM — Z86.0100 HISTORY OF COLON POLYPS: Status: ACTIVE | Noted: 2022-03-09

## 2022-03-09 PROBLEM — H91.93 BILATERAL HEARING LOSS: Status: ACTIVE | Noted: 2022-03-09

## 2022-03-09 PROCEDURE — 99999 PR PBB SHADOW E&M-EST. PATIENT-LVL IV: ICD-10-PCS | Mod: PBBFAC,,, | Performed by: NURSE PRACTITIONER

## 2022-03-09 PROCEDURE — G0439 PPPS, SUBSEQ VISIT: HCPCS | Mod: ,,, | Performed by: NURSE PRACTITIONER

## 2022-03-09 PROCEDURE — G0439 PR MEDICARE ANNUAL WELLNESS SUBSEQUENT VISIT: ICD-10-PCS | Mod: ,,, | Performed by: NURSE PRACTITIONER

## 2022-03-09 PROCEDURE — 99999 PR PBB SHADOW E&M-EST. PATIENT-LVL IV: CPT | Mod: PBBFAC,,, | Performed by: NURSE PRACTITIONER

## 2022-03-09 PROCEDURE — 99214 OFFICE O/P EST MOD 30 MIN: CPT | Mod: PBBFAC,PO | Performed by: NURSE PRACTITIONER

## 2022-03-09 RX ORDER — OLMESARTAN MEDOXOMIL / AMLODIPINE BESYLATE / HYDROCHLOROTHIAZIDE 40; 10; 12.5 MG/1; MG/1; MG/1
1 TABLET, FILM COATED ORAL DAILY
Qty: 90 TABLET | Refills: 1 | Status: SHIPPED | OUTPATIENT
Start: 2022-03-09 | End: 2022-10-17

## 2022-03-09 NOTE — PATIENT INSTRUCTIONS
Counseling and Referral of Other Preventative  (Italic type indicates deductible and co-insurance are waived)    Patient Name: Natalia Mckeon  Today's Date: 3/9/2022    Health Maintenance       Date Due Completion Date    COVID-19 Vaccine (4 - Booster for Pfizer series) 03/08/2022 10/8/2021    Diabetes Urine Screening 06/30/2022 6/30/2021    Lipid Panel 06/30/2022 6/30/2021    Hemoglobin A1c 07/10/2022 1/10/2022    Foot Exam 07/12/2022 7/12/2021    Override on 10/12/2015: Done (Moderate decreased sensation to vibratory sense bilateral feet.  Skin intact.  No onychomycosis.)    DEXA Scan 10/01/2022 10/1/2020    Eye Exam 03/01/2023 3/1/2022 (Done)    Override on 3/1/2022: Done    Override on 11/20/2019: Done    Override on 12/19/2018: Done    Override on 7/20/2015: Done    Override on 7/20/2015: Done    TETANUS VACCINE 06/25/2024 6/25/2014        No orders of the defined types were placed in this encounter.    The following information is provided to all patients.  This information is to help you find resources for any of the problems found today that may be affecting your health:                Living healthy guide: www.[facility].[place].gov      Understanding Diabetes: www.diabetes.org      Eating healthy: www.cdc.gov/healthyweight      CDC home safety checklist: www.cdc.gov/steadi/patient.html      Agency on Aging: www.goea.[place].[facility]      Alcoholics anonymous (AA): www.aa.org      Physical Activity: www.elicia.nih.gov/hr1msoc      Tobacco use: www.quitwithusla.org

## 2022-03-09 NOTE — PROGRESS NOTES
"  Natalia Mckeon presented for a  Medicare AWV and comprehensive Health Risk Assessment today. The following components were reviewed and updated:    · Medical history  · Family History  · Social history  · Allergies and Current Medications  · Health Risk Assessment  · Health Maintenance  · Care Team         ** See Completed Assessments for Annual Wellness Visit within the encounter summary.**         The following assessments were completed:  · Living Situation  · CAGE  · Depression Screening  · Timed Get Up and Go  · Whisper Test  · Cognitive Function Screening  · Nutrition Screening  · ADL Screening  · PAQ Screening        Vitals:    03/09/22 1115 03/09/22 1132   BP: (!) 156/78 (!) 154/72   Pulse: 64    Resp: 18    Temp: 97.3 °F (36.3 °C)    SpO2: 99%    Weight: 63.6 kg (140 lb 3.4 oz)    Height: 5' 2" (1.575 m)      Body mass index is 25.65 kg/m².  Physical Exam  Vitals and nursing note reviewed.   Constitutional:       Appearance: Normal appearance. She is well-developed.      Comments: Severe Hard of hearing   HENT:      Head: Normocephalic and atraumatic.   Eyes:      Pupils: Pupils are equal, round, and reactive to light.   Neck:      Vascular: No carotid bruit.   Cardiovascular:      Rate and Rhythm: Normal rate and regular rhythm.      Pulses: Normal pulses.      Heart sounds: Normal heart sounds. No murmur heard.    No gallop.   Pulmonary:      Effort: Pulmonary effort is normal.      Breath sounds: Normal breath sounds.   Abdominal:      General: Bowel sounds are normal. There is no distension.      Palpations: Abdomen is soft.      Tenderness: There is no abdominal tenderness.   Musculoskeletal:         General: No tenderness. Normal range of motion.   Skin:     General: Skin is warm and dry.   Neurological:      Mental Status: She is alert.      Motor: No abnormal muscle tone.   Psychiatric:         Speech: Speech normal.         Behavior: Behavior normal.         Thought Content: Thought content " normal.         Judgment: Judgment normal.         Current Outpatient Medications:     aspirin 81 MG Chew, Take 1 tablet (81 mg total) by mouth every other day., Disp: , Rfl: 0    CALCIUM CARBONATE (CALCIUM 300 ORAL), Take by mouth., Disp: , Rfl:     fluticasone (FLONASE) 50 mcg/actuation nasal spray, 2 sprays by Each Nare route once daily., Disp: 16 g, Rfl: 6    ipratropium (ATROVENT) 0.03 % nasal spray, 2 sprays by Nasal route 2 (two) times daily., Disp: 30 mL, Rfl: 4    lancets Misc, To check BG 2 times daily, to use with insurance preferred meter  Dx:  E11.9, Disp: 200 each, Rfl: 3    levocetirizine (XYZAL) 5 MG tablet, levocetirizine 5 mg tablet, Disp: , Rfl:     levothyroxine (SYNTHROID) 25 MCG tablet, Take 1 tablet (25 mcg total) by mouth once daily., Disp: 30 tablet, Rfl: 11    metFORMIN (GLUCOPHAGE) 500 MG tablet, Take 1 tablet (500 mg total) by mouth 2 (two) times daily with meals., Disp: 180 tablet, Rfl: 3    olmesartan-amLODIPin-hcthiazid 40-10-12.5 mg Tab, Take 1 tablet by mouth once daily., Disp: 90 tablet, Rfl: 1    omeprazole (PRILOSEC) 40 MG capsule, TAKE 1 CAPSULE(40 MG) BY MOUTH EVERY DAY, Disp: 90 capsule, Rfl: 3    PHENAZOPYRIDINE HCL (AZO ORAL), Take by mouth., Disp: , Rfl:     polyethylene glycol (GLYCOLAX) 17 gram/dose powder, MIX 17 GRAMS ACCORDING TO PACKAGE DIRECTIONS AND DRINK ONCE DAILY, Disp: 510 g, Rfl: 3    rosuvastatin (CRESTOR) 10 MG tablet, Take 1 tablet (10 mg total) by mouth once daily., Disp: 30 tablet, Rfl: 11    sertraline (ZOLOFT) 50 MG tablet, TAKE 1 AND 1/2 TABLETS(75 MG) BY MOUTH EVERY EVENING, Disp: 135 tablet, Rfl: 3    timolol maleate 0.5% (TIMOPTIC) 0.5 % Drop, Place 1 drop into both eyes 2 (two) times daily., Disp: 10 mL, Rfl: 6    ACCU-CHEK COMPACT PLUS TEST Strp, TEST THREE TIMES DAILY, Disp: 102 strip, Rfl: 0    azelastine (ASTELIN) 137 mcg (0.1 %) nasal spray, 2 sprays 2 (two) times daily., Disp: , Rfl:     benzonatate (TESSALON) 100 MG capsule,  Take by mouth., Disp: , Rfl:     blood-glucose meter kit, To check BG 2 times daily, to use with insurance preferred meter  Dx:  E11.9, Disp: 1 each, Rfl: 0    CONTOUR NEXT TEST STRIPS Strp, CHECK BLOOD SUGAR TWICE DAILY, Disp: 200 strip, Rfl: 4    cranberry conc-C-bacillus coag 250-30-50 mg-mg-million Tab, Take by mouth., Disp: , Rfl:     cyanocobalamin, vitamin B-12, 5,000 mcg Subl, Place under the tongue., Disp: , Rfl:     fluticasone-salmeterol diskus inhaler 250-50 mcg, Inhale 1 puff into the lungs 2 (two) times daily., Disp: , Rfl: 0    ketorolac 0.5% (ACULAR) 0.5 % Drop, ketorolac 0.5 % eye drops, Disp: , Rfl:     latanoprost (XALATAN) 0.005 % ophthalmic solution, Place 1 drop into both eyes every evening., Disp: 2.5 mL, Rfl: 11    potassium chloride SA (K-DUR,KLOR-CON) 20 MEQ tablet, TAKE 1 TABLET(20 MEQ) BY MOUTH EVERY DAY, Disp: 90 tablet, Rfl: 3            Diagnoses and health risks identified today and associated recommendations/orders:    1. Encounter for preventive health examination    2. Controlled type 2 diabetes mellitus with diabetic polyneuropathy, without long-term current use of insulin  Component      Latest Ref Rng & Units 1/10/2022   Hemoglobin A1C External      4.0 - 5.6 % 7.4 (H)   Chronic and Ongoing on Metformin scheduled for labs  7/ 2022 . Continue current treatment plan as previously prescribed with your PCP    3. Hypertension associated with diabetes  Chronic and Ongoing.  BP elevated today - states she has not taken   olmesartan-amLODIPin-hcthiazid 40-10-12.5 mg Tab, Take 1 tablet by mouth once daily  Request a recheck with pcp- message to Carlito WEBBER to set up appt and call pt      4. Hyperlipidemia associated with type 2 diabetes mellitus  Chronic and Stable on Crestor. Continue current treatment plan as previously prescribed with your PCP    5. Mild intermittent asthma without complication  Chronic and Stable on Advaor. Continue current treatment plan as previously  prescribed with your PCP    6. Primary open angle glaucoma of both eyes, mild stage  Chronic and Stable.  See drops Continue current treatment plan as previously prescribed with your optholomogist     7. Anxiety  Chronic and Stable on Zolfir. Continue current treatment plan as previously prescribed with your PCP    8. Vitamin D deficiency  Chronic and Stable on vitamin D- UTD DEXA . Continue current treatment plan as previously prescribed with your PCP    9. Recurrent UTI  Chronic and Stable.  statse she tkes prevetive abx occassionsContinue current treatment plan as previously prescribed with your outside urolgist - Dr. Lira    10. Acquired hypothyroidism  Chronic and Stable. Continue current treatment plan as previously prescribed with your PCP    11. Gastroesophageal reflux disease, unspecified whether esophagitis present  Chronic and Stable on Prolisec. Continue current treatment plan as previously prescribed with your PCP    12. History of colon polyps    7 /2018 ..Dr Olivera- tubular adenoma; tortuous colon; diverticulosis in the sigmoid, descending and ascending colon; + hem. Repeat colon in 5 yrs for surveillanc    13 billateal hearing loss  Pt unable to hear most of the visit percy -zachary she has one mary aide and cant afford new ones    14Stage 3a chronic kidney disease  if African American >60 mL/min/1.73 m^2 50.7 Abnormal   46.4 Abnormal   57.6 Abnormal      Chronic and Stable. Continue current treatment plan as previously prescribed with your PCP  .   I offered to discuss advanced care planning, including how to pick a person who would make decisions for you if you were unable to make them for yourself, called a health care power of , and what kind of decisions you might make such as use of life sustaining treatments such as ventilators and tube feeding when faced with a life limiting illness recorded on a living will that they will need to know. (How you want to be cared for as you  near the end of your natural life)    X Patient is interested in learning more about how to make advanced directives.  I provided them paperwork and offered to discuss this with them      Provided Natalia with a 5-10 year written screening schedule and personal prevention plan. Recommendations were developed using the USPSTF age appropriate recommendations. Education, counseling, and referrals were provided as needed. After Visit Summary printed and given to patient which includes a list of additional screenings\tests needed.     1year annual wellness  Arlene Mendez NP .

## 2022-03-09 NOTE — TELEPHONE ENCOUNTER
Refill Authorization Note   Natalia Mckeon  is requesting a refill authorization.  Brief Assessment and Rationale for Refill:  Approve     Medication Therapy Plan:       Medication Reconciliation Completed: No   Comments:   --->Care Gap information included below if applicable.   Orders Placed This Encounter    olmesartan-amLODIPin-hcthiazid 40-10-12.5 mg Tab      Requested Prescriptions   Signed Prescriptions Disp Refills    olmesartan-amLODIPin-hcthiazid 40-10-12.5 mg Tab 90 tablet 1     Sig: Take 1 tablet by mouth once daily.       Cardiovascular: CCB + ARB + Diuretic Combos Passed - 3/2/2022 10:57 AM        Passed - Patient is at least 18 years old        Passed - Last BP in normal range within 360 days     BP Readings from Last 1 Encounters:   01/12/22 132/78               Passed - Last Heart Rate in normal range within 360 days     Pulse Readings from Last 1 Encounters:   01/12/22 71              Passed - Valid encounter within last 15 months     Recent Visits  Date Type Provider Dept   01/12/22 Office Visit Jen Carlton MD [facility] Family Medicine   07/12/21 Office Visit Jen Carlton MD [facility]   01/06/21 Office Visit Jen Carlton MD [facility]   06/30/20 Office Visit Jen Carlton MD [facility]   Showing recent visits within past 720 days and meeting all other requirements  Future Appointments  No visits were found meeting these conditions.  Showing future appointments within next 150 days and meeting all other requirements      Future Appointments              Today Arlene Mendez NP [facility], [address]    In 3 months FARR, VISUAL-ONE [facility] [place] - Ophthalmology 3rd Fl, [facility]    In 3 months Jaspreet Phillip MD [facility] - Ophthalmology 3rd Fl, [facility]    In 3 months SPECIMEN, [facility] - Lab, [address]    In 3 months LABORATORY, [facility]    In 4  months Jen Carlton MD [facility], [facility]                Passed - K in normal range and within 360 days     Potassium   Date Value Ref Range Status   06/30/2021 4.2 3.5 - 5.1 mmol/L Final   07/10/2020 4.9 3.5 - 5.1 mmol/L Final   11/28/2018 4.5 3.5 - 5.1 mmol/L Final              Passed - Na is between 130 and 148 and within 360 days     Sodium   Date Value Ref Range Status   06/30/2021 136 136 - 145 mmol/L Final   07/10/2020 141 136 - 145 mmol/L Final   11/28/2018 139 136 - 145 mmol/L Final              Passed - Cr is 1.39 or below and within 360 days     Lab Results   Component Value Date    CREATININE 1.2 06/30/2021    CREATININE 1.3 07/10/2020    CREATININE 1.1 11/28/2018              Passed - eGFR within 360 days     Lab Results   Component Value Date    EGFRNONAA 44.0 (A) 06/30/2021    EGFRNONAA 40.2 (A) 07/10/2020    EGFRNONAA 49.9 (A) 11/28/2018                    Appointments  past 12m or future 3m with PCP    Date Provider   Last Visit   1/12/2022 Jen Carlton MD   Next Visit   3/4/2022 Jen Carlton MD   ED visits in past 90 days: 0     Note composed:9:48 AM 03/09/2022

## 2022-03-09 NOTE — Clinical Note
Your patient was seen today for a HRA visit. Abnormalities have been identified during this visit that may require additional testing and follow up. I have included a copy of my visit note, please review the note and feel free to contact me with any questions.  Thank you for allowing me to participate in the care of your patients.  Arlene Mendez NP

## 2022-03-17 ENCOUNTER — PATIENT OUTREACH (OUTPATIENT)
Dept: ADMINISTRATIVE | Facility: HOSPITAL | Age: 77
End: 2022-03-17
Payer: MEDICARE

## 2022-03-17 ENCOUNTER — TELEPHONE (OUTPATIENT)
Dept: FAMILY MEDICINE | Facility: CLINIC | Age: 77
End: 2022-03-17
Payer: MEDICARE

## 2022-04-02 DIAGNOSIS — E03.9 ACQUIRED HYPOTHYROIDISM: ICD-10-CM

## 2022-04-02 NOTE — TELEPHONE ENCOUNTER
Care Due:                  Date            Visit Type   Department     Provider  --------------------------------------------------------------------------------                                EP -                              PRIMARY      [facility]    Jen Logan  Last Visit: 01-      CARE ([facility])   MEDICINE       Bianka                              EP -                              PRIMARY      [facility] FAM[facility]    Jen Whitennally  Next Visit: 04-      CARE ([facility])   [facility]                                                            Last  Test          Frequency    Reason                     Performed    Due Date  --------------------------------------------------------------------------------    CMP.........  12 months..  metFORMIN,                 06- 06-                             olmesartan-amLODIPin-hcth                             iazid, rosuvastatin......    Lipid Panel.  12 months..  rosuvastatin.............  06- 06-    TSH.........  12 months..  levothyroxine............  07- 06-    Powered by [employer]. Reference number: [id number].   4/02/2022 10:36:01 AM CDT

## 2022-04-08 RX ORDER — LEVOTHYROXINE SODIUM 25 UG/1
TABLET ORAL
Qty: 90 TABLET | Refills: 1 | Status: SHIPPED | OUTPATIENT
Start: 2022-04-08 | End: 2022-11-01

## 2022-04-08 NOTE — TELEPHONE ENCOUNTER
Refill Authorization Note   Natalia Mckeon  is requesting a refill authorization.  Brief Assessment and Rationale for Refill:  Approve     Medication Therapy Plan:  FOV;FLOS; TSH IS WNL;    Medication Reconciliation Completed: No   Comments:     No Care Gaps recommended.     Note composed:8:08 AM 04/08/2022

## 2022-05-02 ENCOUNTER — TELEPHONE (OUTPATIENT)
Dept: OPHTHALMOLOGY | Facility: CLINIC | Age: 77
End: 2022-05-02
Payer: MEDICARE

## 2022-05-02 RX ORDER — ROSUVASTATIN CALCIUM 10 MG/1
TABLET, COATED ORAL
Qty: 90 TABLET | Refills: 0 | Status: SHIPPED | OUTPATIENT
Start: 2022-05-02 | End: 2022-07-29

## 2022-05-02 NOTE — TELEPHONE ENCOUNTER
Left msg for patient to call and r/s her appts on 6/28/22 due to Dr Phillip being out of clinic in surgery

## 2022-05-02 NOTE — TELEPHONE ENCOUNTER
Refill Authorization Note   Natalia Mckeon  is requesting a refill authorization.  Brief Assessment and Rationale for Refill:  Approve     Medication Therapy Plan:       Medication Reconciliation Completed: No   Comments:     No Care Gaps recommended.     Note composed:4:58 PM 05/02/2022

## 2022-05-02 NOTE — TELEPHONE ENCOUNTER
Care Due:                  Date            Visit Type   Department     Provider  --------------------------------------------------------------------------------                                EP -                              PRIMARY      [facility]    Jen Desmond  Last Visit: 01-      CARE ([facility])   MEDICINE       Bianka                               -                              PRIMARY      [facility] FAMILY    Jen Desmond  Next Visit: 07-      CARE ([facility])   [facility]                                                            Last  Test          Frequency    Reason                     Performed    Due Date  --------------------------------------------------------------------------------    HBA1C.......  6 months...  metFORMIN................  01-   07-    Powered by [employer]. Reference number: [id number].   5/02/2022 4:27:54 AM CDT

## 2022-06-28 ENCOUNTER — IMMUNIZATION (OUTPATIENT)
Dept: PRIMARY CARE CLINIC | Facility: CLINIC | Age: 77
End: 2022-06-28
Payer: MEDICARE

## 2022-06-28 DIAGNOSIS — Z23 NEED FOR VACCINATION: Primary | ICD-10-CM

## 2022-06-28 PROCEDURE — 91305 COVID-19, MRNA, LNP-S, PF, 30 MCG/0.3 ML DOSE VACCINE (PFIZER): CPT | Mod: PBBFAC | Performed by: FAMILY MEDICINE

## 2022-06-29 NOTE — PROGRESS NOTES
"Subjective:      Patient ID: Natalia Mckeon is a 77 y.o. female.    Chief Complaint: Follow-up      HPI  Here for f/u medical problems and preventive exam.  Just metformin.  Exercises regularly.  AC breakfast sugars 150 range.  No f/c/sw/cough.  No cp/sob/palp.  BMs good on miralax.  Anxiety doing well.  Urine normal, s/p abic for UTI.    HM: 10/21 fluvax, 1/21 covid vaccines/booster, 7/15 [id number], 2/18 booster [id number], 6/14 TDaP, 10/14 zostervax at pharmacy, 2019 Shingrix x2, 7/21 mmg/Gyn Dr. Obrien, bmd per her, 3/19 Cscope Dr. Auguste rep 5y, 5/21 Eye Dr. Phillip, 11/16 HCV neg, Cards Dr. Jm Vasquez, Urol Dr. Lira.     Review of Systems   Constitutional: Negative for appetite change, chills, diaphoresis and fever.   HENT: Negative for congestion, ear pain, rhinorrhea, sinus pressure and sore throat.    Respiratory: Negative for cough, chest tightness and shortness of breath.    Cardiovascular: Negative for chest pain and palpitations.   Gastrointestinal: Negative for blood in stool, constipation, diarrhea, nausea and vomiting.   Genitourinary: Negative for dysuria, frequency, hematuria, menstrual problem, urgency and vaginal discharge.   Musculoskeletal: Negative for arthralgias.   Skin: Negative for rash.   Neurological: Negative for dizziness and headaches.   Psychiatric/Behavioral: Negative for sleep disturbance. The patient is not nervous/anxious.          Objective:   /75 (BP Location: Right arm, Patient Position: Sitting, BP Method: Small (Manual))   Pulse 89   Temp 96.1 °F (35.6 °C)   Ht 5' 2" (1.575 m)   Wt 63.7 kg (140 lb 6.9 oz)   SpO2 95%   BMI 25.69 kg/m²     Physical Exam  Constitutional:       Appearance: She is well-developed.   HENT:      Right Ear: External ear normal. Tympanic membrane is not injected.      Left Ear: External ear normal. Tympanic membrane is not injected.   Eyes:      Conjunctiva/sclera: Conjunctivae normal.   Neck:      Thyroid: No thyromegaly. "   Cardiovascular:      Rate and Rhythm: Normal rate and regular rhythm.      Pulses:           Dorsalis pedis pulses are 2+ on the right side and 2+ on the left side.        Posterior tibial pulses are 2+ on the right side and 2+ on the left side.      Heart sounds: No murmur heard.    No friction rub. No gallop.   Pulmonary:      Effort: Pulmonary effort is normal.      Breath sounds: Normal breath sounds. No wheezing or rales.   Abdominal:      General: Bowel sounds are normal.      Palpations: Abdomen is soft. There is no mass.      Tenderness: There is no abdominal tenderness.   Musculoskeletal:      Cervical back: Normal range of motion and neck supple.   Feet:      Right foot:      Protective Sensation: 10 sites tested. 10 sites sensed.      Skin integrity: No ulcer, blister, skin breakdown, erythema, warmth, callus or dry skin.      Left foot:      Protective Sensation: 10 sites tested. 10 sites sensed.      Skin integrity: No ulcer, blister, skin breakdown, erythema, warmth, callus or dry skin.   Lymphadenopathy:      Cervical: No cervical adenopathy.   Skin:     General: Skin is warm.      Findings: No rash.   Neurological:      Mental Status: She is alert and oriented to person, place, and time.             Latest Reference Range & Units 07/06/22 08:41 07/06/22 09:01   WBC 3.90 - 12.70 K/uL  5.16   RBC 4.00 - 5.40 M/uL  4.46   Hemoglobin 12.0 - 16.0 g/dL  12.2   Hematocrit 37.0 - 48.5 %  38.1   MCV 82 - 98 fL  85   MCH 27.0 - 31.0 pg  27.4   MCHC 32.0 - 36.0 g/dL  32.0   RDW 11.5 - 14.5 %  14.8 (H)   Platelets 150 - 450 K/uL  204   MPV 9.2 - 12.9 fL  11.1   Gran % 38.0 - 73.0 %  60.6   Lymph % 18.0 - 48.0 %  28.1   Mono % 4.0 - 15.0 %  7.6   Eosinophil % 0.0 - 8.0 %  2.7   Basophil % 0.0 - 1.9 %  0.6   Immature Granulocytes 0.0 - 0.5 %  0.4   Gran # (ANC) 1.8 - 7.7 K/uL  3.1   Lymph # 1.0 - 4.8 K/uL  1.5   Mono # 0.3 - 1.0 K/uL  0.4   Eos # 0.0 - 0.5 K/uL  0.1   Baso # 0.00 - 0.20 K/uL  0.03   Immature  Grans (Abs) 0.00 - 0.04 K/uL  0.02   nRBC 0 /100 WBC  0   Differential Method   Automated   Sodium 136 - 145 mmol/L  139   Potassium 3.5 - 5.1 mmol/L  4.5   Chloride 95 - 110 mmol/L  103   CO2 23 - 29 mmol/L  26   Anion Gap 8 - 16 mmol/L  10   BUN 8 - 23 mg/dL  20   Creatinine 0.5 - 1.4 mg/dL  1.3   eGFR if non African American >60 mL/min/1.73 m^2  39.7 !   eGFR if African American >60 mL/min/1.73 m^2  45.7 !   Glucose 70 - 110 mg/dL  150 (H)   Calcium 8.7 - 10.5 mg/dL  9.8   Alkaline Phosphatase 55 - 135 U/L  87   PROTEIN TOTAL 6.0 - 8.4 g/dL  7.2   Albumin 3.5 - 5.2 g/dL  3.9   BILIRUBIN TOTAL 0.1 - 1.0 mg/dL  0.4   AST 10 - 40 U/L  19   ALT 10 - 44 U/L  18   Cholesterol 120 - 199 mg/dL  142   HDL 40 - 75 mg/dL  54   HDL/Cholesterol Ratio 20.0 - 50.0 %  38.0   LDL Cholesterol External 63.0 - 159.0 mg/dL  75.6   Non-HDL Cholesterol mg/dL  88   Total Cholesterol/HDL Ratio 2.0 - 5.0   2.6   Triglycerides 30 - 150 mg/dL  62   Hemoglobin A1C External 4.0 - 5.6 %  7.5 (H)   Estimated Avg Glucose 68 - 131 mg/dL  169 (H)   TSH 0.400 - 4.000 uIU/mL  3.418   Creatinine, Urine 15.0 - 325.0 mg/dL 62.0    Microalbumin, Urine ug/mL 11.0    MICROALB/CREAT RATIO 0.0 - 30.0 ug/mg 17.7        Assessment:       1. Acquired hypothyroidism    2. Hypertension associated with diabetes    3. Controlled type 2 diabetes mellitus with diabetic polyneuropathy, without long-term current use of insulin    4. Hyperlipidemia associated with type 2 diabetes mellitus    5. Gastroesophageal reflux disease without esophagitis    6. Anxiety    7. Mild intermittent asthma without complication    8. Stage 3a chronic kidney disease    9. Vitamin D deficiency    10. Encounter for preventive health examination          Plan:     Acquired hypothyroidism- Clinically stable, continue present treatment.    Hypertension associated with diabetes- stable, cotn rx.    Controlled type 2 diabetes mellitus with diabetic polyneuropathy, without long-term current use  of insulin- cont present metformin, increase exercise, recheck 3mo.    Hyperlipidemia associated with type 2 diabetes mellitus- dogin well, cont statin.    Gastroesophageal reflux disease without esophagitis- doing well, cont PPI.    Anxiety- doing well, cont rx.    Mild intermittent asthma without complication- doing well.    Stage 3a chronic kidney disease  -     Basic Metabolic Panel; Future; Expected date: 07/13/2022    Vitamin D deficiency    Encounter for preventive health examination- mmg/gyn to be scheduled.    Increase fluids, recheck lab in 3mo.

## 2022-07-06 ENCOUNTER — LAB VISIT (OUTPATIENT)
Dept: LAB | Facility: HOSPITAL | Age: 77
End: 2022-07-06
Attending: INTERNAL MEDICINE
Payer: MEDICARE

## 2022-07-06 DIAGNOSIS — E11.69 HYPERLIPIDEMIA ASSOCIATED WITH TYPE 2 DIABETES MELLITUS: ICD-10-CM

## 2022-07-06 DIAGNOSIS — E78.5 HYPERLIPIDEMIA ASSOCIATED WITH TYPE 2 DIABETES MELLITUS: ICD-10-CM

## 2022-07-06 DIAGNOSIS — E11.42 CONTROLLED TYPE 2 DIABETES MELLITUS WITH DIABETIC POLYNEUROPATHY, WITHOUT LONG-TERM CURRENT USE OF INSULIN: ICD-10-CM

## 2022-07-06 DIAGNOSIS — Z29.9 PREVENTIVE MEASURE: ICD-10-CM

## 2022-07-06 DIAGNOSIS — E03.9 ACQUIRED HYPOTHYROIDISM: ICD-10-CM

## 2022-07-06 LAB
ALBUMIN SERPL BCP-MCNC: 3.9 G/DL (ref 3.5–5.2)
ALP SERPL-CCNC: 87 U/L (ref 55–135)
ALT SERPL W/O P-5'-P-CCNC: 18 U/L (ref 10–44)
ANION GAP SERPL CALC-SCNC: 10 MMOL/L (ref 8–16)
AST SERPL-CCNC: 19 U/L (ref 10–40)
BASOPHILS # BLD AUTO: 0.03 K/UL (ref 0–0.2)
BASOPHILS NFR BLD: 0.6 % (ref 0–1.9)
BILIRUB SERPL-MCNC: 0.4 MG/DL (ref 0.1–1)
BUN SERPL-MCNC: 20 MG/DL (ref 8–23)
CALCIUM SERPL-MCNC: 9.8 MG/DL (ref 8.7–10.5)
CHLORIDE SERPL-SCNC: 103 MMOL/L (ref 95–110)
CHOLEST SERPL-MCNC: 142 MG/DL (ref 120–199)
CHOLEST/HDLC SERPL: 2.6 {RATIO} (ref 2–5)
CO2 SERPL-SCNC: 26 MMOL/L (ref 23–29)
CREAT SERPL-MCNC: 1.3 MG/DL (ref 0.5–1.4)
DIFFERENTIAL METHOD: ABNORMAL
EOSINOPHIL # BLD AUTO: 0.1 K/UL (ref 0–0.5)
EOSINOPHIL NFR BLD: 2.7 % (ref 0–8)
ERYTHROCYTE [DISTWIDTH] IN BLOOD BY AUTOMATED COUNT: 14.8 % (ref 11.5–14.5)
EST. GFR  (AFRICAN AMERICAN): 45.7 ML/MIN/1.73 M^2
EST. GFR  (NON AFRICAN AMERICAN): 39.7 ML/MIN/1.73 M^2
ESTIMATED AVG GLUCOSE: 169 MG/DL (ref 68–131)
GLUCOSE SERPL-MCNC: 150 MG/DL (ref 70–110)
HBA1C MFR BLD: 7.5 % (ref 4–5.6)
HCT VFR BLD AUTO: 38.1 % (ref 37–48.5)
HDLC SERPL-MCNC: 54 MG/DL (ref 40–75)
HDLC SERPL: 38 % (ref 20–50)
HGB BLD-MCNC: 12.2 G/DL (ref 12–16)
IMM GRANULOCYTES # BLD AUTO: 0.02 K/UL (ref 0–0.04)
IMM GRANULOCYTES NFR BLD AUTO: 0.4 % (ref 0–0.5)
LDLC SERPL CALC-MCNC: 75.6 MG/DL (ref 63–159)
LYMPHOCYTES # BLD AUTO: 1.5 K/UL (ref 1–4.8)
LYMPHOCYTES NFR BLD: 28.1 % (ref 18–48)
MCH RBC QN AUTO: 27.4 PG (ref 27–31)
MCHC RBC AUTO-ENTMCNC: 32 G/DL (ref 32–36)
MCV RBC AUTO: 85 FL (ref 82–98)
MONOCYTES # BLD AUTO: 0.4 K/UL (ref 0.3–1)
MONOCYTES NFR BLD: 7.6 % (ref 4–15)
NEUTROPHILS # BLD AUTO: 3.1 K/UL (ref 1.8–7.7)
NEUTROPHILS NFR BLD: 60.6 % (ref 38–73)
NONHDLC SERPL-MCNC: 88 MG/DL
NRBC BLD-RTO: 0 /100 WBC
PLATELET # BLD AUTO: 204 K/UL (ref 150–450)
PMV BLD AUTO: 11.1 FL (ref 9.2–12.9)
POTASSIUM SERPL-SCNC: 4.5 MMOL/L (ref 3.5–5.1)
PROT SERPL-MCNC: 7.2 G/DL (ref 6–8.4)
RBC # BLD AUTO: 4.46 M/UL (ref 4–5.4)
SODIUM SERPL-SCNC: 139 MMOL/L (ref 136–145)
TRIGL SERPL-MCNC: 62 MG/DL (ref 30–150)
TSH SERPL DL<=0.005 MIU/L-ACNC: 3.42 UIU/ML (ref 0.4–4)
WBC # BLD AUTO: 5.16 K/UL (ref 3.9–12.7)

## 2022-07-06 PROCEDURE — 85025 COMPLETE CBC W/AUTO DIFF WBC: CPT | Performed by: INTERNAL MEDICINE

## 2022-07-06 PROCEDURE — 84443 ASSAY THYROID STIM HORMONE: CPT | Performed by: INTERNAL MEDICINE

## 2022-07-06 PROCEDURE — 83036 HEMOGLOBIN GLYCOSYLATED A1C: CPT | Performed by: INTERNAL MEDICINE

## 2022-07-06 PROCEDURE — 36415 COLL VENOUS BLD VENIPUNCTURE: CPT | Mod: PO | Performed by: INTERNAL MEDICINE

## 2022-07-06 PROCEDURE — 80053 COMPREHEN METABOLIC PANEL: CPT | Performed by: INTERNAL MEDICINE

## 2022-07-06 PROCEDURE — 80061 LIPID PANEL: CPT | Performed by: INTERNAL MEDICINE

## 2022-07-13 ENCOUNTER — OFFICE VISIT (OUTPATIENT)
Dept: FAMILY MEDICINE | Facility: CLINIC | Age: 77
End: 2022-07-13
Payer: MEDICARE

## 2022-07-13 VITALS
BODY MASS INDEX: 25.84 KG/M2 | HEIGHT: 62 IN | WEIGHT: 140.44 LBS | HEART RATE: 89 BPM | TEMPERATURE: 96 F | SYSTOLIC BLOOD PRESSURE: 132 MMHG | OXYGEN SATURATION: 95 % | DIASTOLIC BLOOD PRESSURE: 75 MMHG

## 2022-07-13 DIAGNOSIS — J45.20 MILD INTERMITTENT ASTHMA WITHOUT COMPLICATION: ICD-10-CM

## 2022-07-13 DIAGNOSIS — E11.42 CONTROLLED TYPE 2 DIABETES MELLITUS WITH DIABETIC POLYNEUROPATHY, WITHOUT LONG-TERM CURRENT USE OF INSULIN: ICD-10-CM

## 2022-07-13 DIAGNOSIS — N18.31 STAGE 3A CHRONIC KIDNEY DISEASE: ICD-10-CM

## 2022-07-13 DIAGNOSIS — Z00.00 ENCOUNTER FOR PREVENTIVE HEALTH EXAMINATION: ICD-10-CM

## 2022-07-13 DIAGNOSIS — E03.9 ACQUIRED HYPOTHYROIDISM: Primary | ICD-10-CM

## 2022-07-13 DIAGNOSIS — E78.5 HYPERLIPIDEMIA ASSOCIATED WITH TYPE 2 DIABETES MELLITUS: ICD-10-CM

## 2022-07-13 DIAGNOSIS — E11.59 HYPERTENSION ASSOCIATED WITH DIABETES: ICD-10-CM

## 2022-07-13 DIAGNOSIS — K21.9 GASTROESOPHAGEAL REFLUX DISEASE WITHOUT ESOPHAGITIS: ICD-10-CM

## 2022-07-13 DIAGNOSIS — E11.69 HYPERLIPIDEMIA ASSOCIATED WITH TYPE 2 DIABETES MELLITUS: ICD-10-CM

## 2022-07-13 DIAGNOSIS — E55.9 VITAMIN D DEFICIENCY: ICD-10-CM

## 2022-07-13 DIAGNOSIS — I15.2 HYPERTENSION ASSOCIATED WITH DIABETES: ICD-10-CM

## 2022-07-13 DIAGNOSIS — F41.9 ANXIETY: ICD-10-CM

## 2022-07-13 PROCEDURE — 3078F PR MOST RECENT DIASTOLIC BLOOD PRESSURE < 80 MM HG: ICD-10-PCS | Mod: CPTII,S$GLB,, | Performed by: INTERNAL MEDICINE

## 2022-07-13 PROCEDURE — 1101F PT FALLS ASSESS-DOCD LE1/YR: CPT | Mod: CPTII,S$GLB,, | Performed by: INTERNAL MEDICINE

## 2022-07-13 PROCEDURE — 3075F PR MOST RECENT SYSTOLIC BLOOD PRESS GE 130-139MM HG: ICD-10-PCS | Mod: CPTII,S$GLB,, | Performed by: INTERNAL MEDICINE

## 2022-07-13 PROCEDURE — 99999 PR PBB SHADOW E&M-EST. PATIENT-LVL IV: ICD-10-PCS | Mod: PBBFAC,,, | Performed by: INTERNAL MEDICINE

## 2022-07-13 PROCEDURE — 99214 PR OFFICE/OUTPT VISIT, EST, LEVL IV, 30-39 MIN: ICD-10-PCS | Mod: S$GLB,,, | Performed by: INTERNAL MEDICINE

## 2022-07-13 PROCEDURE — 3051F PR MOST RECENT HEMOGLOBIN A1C LEVEL 7.0 - < 8.0%: ICD-10-PCS | Mod: CPTII,S$GLB,, | Performed by: INTERNAL MEDICINE

## 2022-07-13 PROCEDURE — 3075F SYST BP GE 130 - 139MM HG: CPT | Mod: CPTII,S$GLB,, | Performed by: INTERNAL MEDICINE

## 2022-07-13 PROCEDURE — 99999 PR PBB SHADOW E&M-EST. PATIENT-LVL IV: CPT | Mod: PBBFAC,,, | Performed by: INTERNAL MEDICINE

## 2022-07-13 PROCEDURE — 1159F MED LIST DOCD IN RCRD: CPT | Mod: CPTII,S$GLB,, | Performed by: INTERNAL MEDICINE

## 2022-07-13 PROCEDURE — 3078F DIAST BP <80 MM HG: CPT | Mod: CPTII,S$GLB,, | Performed by: INTERNAL MEDICINE

## 2022-07-13 PROCEDURE — 99214 OFFICE O/P EST MOD 30 MIN: CPT | Mod: S$GLB,,, | Performed by: INTERNAL MEDICINE

## 2022-07-13 PROCEDURE — 3288F FALL RISK ASSESSMENT DOCD: CPT | Mod: CPTII,S$GLB,, | Performed by: INTERNAL MEDICINE

## 2022-07-13 PROCEDURE — 3288F PR FALLS RISK ASSESSMENT DOCUMENTED: ICD-10-PCS | Mod: CPTII,S$GLB,, | Performed by: INTERNAL MEDICINE

## 2022-07-13 PROCEDURE — 3051F HG A1C>EQUAL 7.0%<8.0%: CPT | Mod: CPTII,S$GLB,, | Performed by: INTERNAL MEDICINE

## 2022-07-13 PROCEDURE — 1101F PR PT FALLS ASSESS DOC 0-1 FALLS W/OUT INJ PAST YR: ICD-10-PCS | Mod: CPTII,S$GLB,, | Performed by: INTERNAL MEDICINE

## 2022-07-13 PROCEDURE — 1159F PR MEDICATION LIST DOCUMENTED IN MEDICAL RECORD: ICD-10-PCS | Mod: CPTII,S$GLB,, | Performed by: INTERNAL MEDICINE

## 2022-07-13 RX ORDER — METFORMIN HYDROCHLORIDE 500 MG/1
500 TABLET ORAL 2 TIMES DAILY WITH MEALS
Qty: 180 TABLET | Refills: 3 | Status: SHIPPED | OUTPATIENT
Start: 2022-07-13 | End: 2023-04-06 | Stop reason: SDUPTHER

## 2022-07-13 RX ORDER — METFORMIN HYDROCHLORIDE 500 MG/1
1000 TABLET ORAL 2 TIMES DAILY WITH MEALS
Qty: 180 TABLET | Refills: 3 | Status: SHIPPED | OUTPATIENT
Start: 2022-07-13 | End: 2022-07-13

## 2022-07-24 DIAGNOSIS — E11.42 CONTROLLED TYPE 2 DIABETES MELLITUS WITH DIABETIC POLYNEUROPATHY, WITHOUT LONG-TERM CURRENT USE OF INSULIN: ICD-10-CM

## 2022-07-24 NOTE — TELEPHONE ENCOUNTER
No new care gaps identified.  [facility] [place] Embedded Care Gaps. Reference number: [id number]. 7/24/2022   4:24:10 AM CDT

## 2022-07-25 RX ORDER — BLOOD SUGAR DIAGNOSTIC
STRIP MISCELLANEOUS
Qty: 200 STRIP | Refills: 3 | Status: SHIPPED | OUTPATIENT
Start: 2022-07-25 | End: 2023-08-04 | Stop reason: SDUPTHER

## 2022-07-26 NOTE — TELEPHONE ENCOUNTER
Refill Decision Note   Natalia Mike  is requesting a refill authorization.  Brief Assessment and Rationale for Refill:  Approve     Medication Therapy Plan:       Medication Reconciliation Completed: No   Comments:     No Care Gaps recommended.     Note composed:10:10 PM 07/25/2022

## 2022-07-29 RX ORDER — ROSUVASTATIN CALCIUM 10 MG/1
TABLET, COATED ORAL
Qty: 90 TABLET | Refills: 3 | Status: SHIPPED | OUTPATIENT
Start: 2022-07-29

## 2022-07-29 NOTE — TELEPHONE ENCOUNTER
No new care gaps identified.  [facility] Embedded Care Gaps. Reference number: [id number]. 7/29/2022   4:21:40 AM CDT  
Refill Decision Note   Natalia Mike  is requesting a refill authorization.  Brief Assessment and Rationale for Refill:  Approve     Medication Therapy Plan:       Medication Reconciliation Completed: No   Comments:     No Care Gaps recommended.     Note composed:11:40 AM 07/29/2022          
Statement Selected

## 2022-09-02 ENCOUNTER — OFFICE VISIT (OUTPATIENT)
Dept: OPHTHALMOLOGY | Facility: CLINIC | Age: 77
End: 2022-09-02
Payer: MEDICARE

## 2022-09-02 DIAGNOSIS — Z91.199 NON-COMPLIANCE: ICD-10-CM

## 2022-09-02 DIAGNOSIS — H04.123 DRY EYE SYNDROME OF BOTH EYES: ICD-10-CM

## 2022-09-02 DIAGNOSIS — E11.3553 TYPE 2 DIABETES MELLITUS WITH STABLE PROLIFERATIVE RETINOPATHY OF BOTH EYES, WITHOUT LONG-TERM CURRENT USE OF INSULIN: ICD-10-CM

## 2022-09-02 DIAGNOSIS — H40.1131 PRIMARY OPEN ANGLE GLAUCOMA OF BOTH EYES, MILD STAGE: Primary | ICD-10-CM

## 2022-09-02 DIAGNOSIS — E11.36 DIABETIC CATARACT OF BOTH EYES: ICD-10-CM

## 2022-09-02 PROCEDURE — 1159F MED LIST DOCD IN RCRD: CPT | Mod: CPTII,S$GLB,, | Performed by: OPHTHALMOLOGY

## 2022-09-02 PROCEDURE — 99999 PR PBB SHADOW E&M-EST. PATIENT-LVL III: CPT | Mod: PBBFAC,,, | Performed by: OPHTHALMOLOGY

## 2022-09-02 PROCEDURE — 1160F PR REVIEW ALL MEDS BY PRESCRIBER/CLIN PHARMACIST DOCUMENTED: ICD-10-PCS | Mod: CPTII,S$GLB,, | Performed by: OPHTHALMOLOGY

## 2022-09-02 PROCEDURE — 92083 EXTENDED VISUAL FIELD XM: CPT | Mod: S$GLB,,, | Performed by: OPHTHALMOLOGY

## 2022-09-02 PROCEDURE — 92020 GONIOSCOPY: CPT | Mod: S$GLB,,, | Performed by: OPHTHALMOLOGY

## 2022-09-02 PROCEDURE — 99999 PR PBB SHADOW E&M-EST. PATIENT-LVL III: ICD-10-PCS | Mod: PBBFAC,,, | Performed by: OPHTHALMOLOGY

## 2022-09-02 PROCEDURE — 92133 POSTERIOR SEGMENT OCT OPTIC NERVE(OCULAR COHERENCE TOMOGRAPHY) - OU - BOTH EYES: ICD-10-PCS | Mod: S$GLB,,, | Performed by: OPHTHALMOLOGY

## 2022-09-02 PROCEDURE — 92083 HUMPHREY VISUAL FIELD - OU - BOTH EYES: ICD-10-PCS | Mod: S$GLB,,, | Performed by: OPHTHALMOLOGY

## 2022-09-02 PROCEDURE — 1159F PR MEDICATION LIST DOCUMENTED IN MEDICAL RECORD: ICD-10-PCS | Mod: CPTII,S$GLB,, | Performed by: OPHTHALMOLOGY

## 2022-09-02 PROCEDURE — 99214 PR OFFICE/OUTPT VISIT, EST, LEVL IV, 30-39 MIN: ICD-10-PCS | Mod: S$GLB,,, | Performed by: OPHTHALMOLOGY

## 2022-09-02 PROCEDURE — 1160F RVW MEDS BY RX/DR IN RCRD: CPT | Mod: CPTII,S$GLB,, | Performed by: OPHTHALMOLOGY

## 2022-09-02 PROCEDURE — 99214 OFFICE O/P EST MOD 30 MIN: CPT | Mod: S$GLB,,, | Performed by: OPHTHALMOLOGY

## 2022-09-02 PROCEDURE — 92020 PR SPECIAL EYE EVAL,GONIOSCOPY: ICD-10-PCS | Mod: S$GLB,,, | Performed by: OPHTHALMOLOGY

## 2022-09-02 PROCEDURE — 92133 CPTRZD OPH DX IMG PST SGM ON: CPT | Mod: S$GLB,,, | Performed by: OPHTHALMOLOGY

## 2022-09-02 RX ORDER — FLUCONAZOLE 150 MG/1
150 TABLET ORAL DAILY
COMMUNITY
Start: 2022-07-21 | End: 2023-04-06 | Stop reason: SDUPTHER

## 2022-09-02 RX ORDER — LATANOPROST 50 UG/ML
1 SOLUTION/ DROPS OPHTHALMIC NIGHTLY
Qty: 2.5 ML | Refills: 2 | Status: SHIPPED | OUTPATIENT
Start: 2022-09-02 | End: 2022-11-21

## 2022-09-02 RX ORDER — CEPHALEXIN 250 MG/1
250 CAPSULE ORAL DAILY
COMMUNITY
Start: 2022-07-28

## 2022-09-02 NOTE — PROGRESS NOTES
HPI     Glaucoma            Comments: Pt here for lost to f/u HVF GOCT IOP chk. No pain or   discomfort. VA stable. No gtts.           Comments    DM SINCE 2006  BDR  H/o DME OD  VMA  COAG (NEWLY DX 3/2018)  +FAMHX-GLAUCOMA-BROTHER      Latanoprost caused redness  Timolol caused redness            Last edited by Gaston Juares MA on 9/2/2022 10:44 AM.            Assessment /Plan     For exam results, see Encounter Report.      ICD-10-CM ICD-9-CM    1. Primary open angle glaucoma of both eyes, mild stage  H40.1131 365.11 Carlos Visual Field - OU - Extended - Both Eyes     365.71 Posterior Segment OCT Optic Nerve- Both eyes      latanoprost (XALATAN) 0.005 % ophthalmic solution    IOP not within acceptable range relative to target IOP with risk of irreversible visual loss. Additional treatment required.  Discussed options, risks, and benefits of additional medication, SLT laser, or incisional glaucoma surgery.     recommend medication compliance, resume Latanoprost qam OU. Also discussed SLT     Patient chooses the above     Reviewed importance of continued compliance with treatment and follow up.         2. Type 2 diabetes mellitus with stable proliferative retinopathy of both eyes, without long-term current use of insulin  E11.3553 250.50 No dilation today, though stable at previous.   Reviewed diabetic eye precautions including excellent blood sugar control, and importance of regular follow up.      362.02       3. Diabetic cataract of both eyes  E11.36 250.50 You were found to have an early cataract in your eye(s) today, however the cataract is not affecting your activities of daily living, such as reading and driving.You do not need  surgery at this time. We will recheck your cataract at your next visit. You are welcome to call for an earlier appointment if your vision gets worse.     366.41       4. Dry eye syndrome of both eyes  H04.123 375.15 Recommend Pataday PRN OU      5. Non-compliance  Z91.19 V15.81  Discussed importance of medication compliance, she understands.          Return to clinic 4 month IOP check        Latanoprost qam OU

## 2022-09-28 ENCOUNTER — OFFICE VISIT (OUTPATIENT)
Dept: OPHTHALMOLOGY | Facility: CLINIC | Age: 77
End: 2022-09-28
Payer: MEDICARE

## 2022-09-28 DIAGNOSIS — H40.1131 PRIMARY OPEN ANGLE GLAUCOMA OF BOTH EYES, MILD STAGE: Primary | ICD-10-CM

## 2022-09-28 DIAGNOSIS — Z91.199 NON-COMPLIANCE: ICD-10-CM

## 2022-09-28 PROCEDURE — 1159F PR MEDICATION LIST DOCUMENTED IN MEDICAL RECORD: ICD-10-PCS | Mod: CPTII,S$GLB,, | Performed by: OPHTHALMOLOGY

## 2022-09-28 PROCEDURE — 99999 PR PBB SHADOW E&M-EST. PATIENT-LVL II: CPT | Mod: PBBFAC,,, | Performed by: OPHTHALMOLOGY

## 2022-09-28 PROCEDURE — 1160F RVW MEDS BY RX/DR IN RCRD: CPT | Mod: CPTII,S$GLB,, | Performed by: OPHTHALMOLOGY

## 2022-09-28 PROCEDURE — 1159F MED LIST DOCD IN RCRD: CPT | Mod: CPTII,S$GLB,, | Performed by: OPHTHALMOLOGY

## 2022-09-28 PROCEDURE — 99213 PR OFFICE/OUTPT VISIT, EST, LEVL III, 20-29 MIN: ICD-10-PCS | Mod: S$GLB,,, | Performed by: OPHTHALMOLOGY

## 2022-09-28 PROCEDURE — 1160F PR REVIEW ALL MEDS BY PRESCRIBER/CLIN PHARMACIST DOCUMENTED: ICD-10-PCS | Mod: CPTII,S$GLB,, | Performed by: OPHTHALMOLOGY

## 2022-09-28 PROCEDURE — 99999 PR PBB SHADOW E&M-EST. PATIENT-LVL II: ICD-10-PCS | Mod: PBBFAC,,, | Performed by: OPHTHALMOLOGY

## 2022-09-28 PROCEDURE — 99213 OFFICE O/P EST LOW 20 MIN: CPT | Mod: S$GLB,,, | Performed by: OPHTHALMOLOGY

## 2022-09-28 RX ORDER — BRIMONIDINE TARTRATE 2 MG/ML
1 SOLUTION/ DROPS OPHTHALMIC 2 TIMES DAILY
Qty: 15 ML | Refills: 3 | Status: SHIPPED | OUTPATIENT
Start: 2022-09-28 | End: 2023-09-26 | Stop reason: SDUPTHER

## 2022-09-28 NOTE — PROGRESS NOTES
HPI     Glaucoma            Comments: IOP check    Patient feels OU is doing well, no changes in VA and using drops as   directed.  At times OS itches in the nasal corner but nothing unbearable.          Comments    DM SINCE 2006  BDR  H/o DME OD  VMA  COAG (NEWLY DX 3/2018)  +FAMHX-GLAUCOMA-BROTHER      Latanoprost caused redness  Timolol caused redness        Latanoprost qam OU          Last edited by Anastasia Beach, Patient Care Assistant on 9/28/2022 11:34   AM.            Assessment /Plan     For exam results, see Encounter Report.      ICD-10-CM ICD-9-CM    1. Primary open angle glaucoma of both eyes, mild stage  H40.1131 365.11 IOP not within acceptable range relative to target IOP with risk of irreversible visual loss. Additional treatment required.  Discussed options, risks, and benefits of additional medication, SLT laser, or incisional glaucoma surgery.     Recommend Brimonidine BID OU  D/c Latanoprost     Patient chooses the above     Reviewed importance of continued compliance with treatment and follow up.        365.71       2. Non-compliance  Z91.19 V15.81           Return to clinic 4-6 weeks for IOP check      Brimonidine BID OU

## 2022-10-31 NOTE — PROGRESS NOTES
"Subjective:      Patient ID: Natalia Mckeon is a 77 y.o. female.    Chief Complaint: Cough (Coughing and congestion for about a week. Pt has had flu shot and covid booster.)      HPI  Here for follow up of medical problems.  Exercising at least 3d per week, walking and exercise class.  No cp/sob/palp.  Cough and congestion x few days, mucinex/claritin is helping.  Fever at the beginning, now getting better.  No n/v/d.  No cp/sob/palp.  No head pressure.    Updated/ annual due 7/23:  HM: 10/21 fluvax, 1/21 covid vaccines/booster, 7/15 [id number], 2/18 booster [id number], 6/14 TDaP, 10/14 zostervax at pharmacy, 2019 Shingrix x2, 10/22 mmg/Gyn Dr. Obrien, bmd per her, 3/19 Cscope Dr. Auguste rep 5y, 5/21 Eye Dr. Phillip, 11/16 HCV neg, Cards Dr. Jm Vasquez, Urol Dr. Lira.     Review of Systems   Constitutional:  Negative for chills, diaphoresis and fever.   Respiratory:  Negative for cough and shortness of breath.    Cardiovascular:  Negative for chest pain, palpitations and leg swelling.   Gastrointestinal:  Negative for blood in stool, constipation, diarrhea, nausea and vomiting.   Genitourinary:  Negative for dysuria, frequency and hematuria.   Psychiatric/Behavioral:  The patient is not nervous/anxious.        Objective:   /68 (BP Location: Left arm)   Pulse 88   Temp 98.8 °F (37.1 °C) (Oral)   Ht 5' 2.5" (1.588 m)   Wt 62.6 kg (138 lb)   SpO2 97%   BMI 24.84 kg/m²     Physical Exam  Constitutional:       Appearance: She is well-developed.   Neck:      Thyroid: No thyroid mass.      Vascular: No carotid bruit.   Cardiovascular:      Rate and Rhythm: Normal rate and regular rhythm.      Heart sounds: No murmur heard.    No friction rub. No gallop.   Pulmonary:      Effort: Pulmonary effort is normal.      Breath sounds: Normal breath sounds. No wheezing or rales.   Abdominal:      General: Bowel sounds are normal.      Palpations: Abdomen is soft. There is no mass.      Tenderness: There is no " abdominal tenderness.   Musculoskeletal:      Cervical back: Neck supple.   Lymphadenopathy:      Cervical: No cervical adenopathy.   Neurological:      Mental Status: She is alert and oriented to person, place, and time.         Assessment:       1. Controlled type 2 diabetes mellitus with diabetic polyneuropathy, without long-term current use of insulin    2. Stage 3a chronic kidney disease    3. Hypertension associated with diabetes    4. Mild intermittent asthma without complication    5. Acquired hypothyroidism          Plan:     Controlled type 2 diabetes mellitus with diabetic polyneuropathy, without long-term current use of insulin- recheck now.  -     Hemoglobin A1C; Future; Expected date: 11/09/2022    Stage 3a chronic kidney disease- recheck now.  -     Basic Metabolic Panel; Future; Expected date: 11/09/2022    Hypertension associated with diabetes- stable, cont rx.    Mild intermittent asthma without complication- no flare with current viral uri, cont inhaler.    Acquired hypothyroidism- Clinically stable, continue present treatment.    RTC 3mo.

## 2022-11-02 DIAGNOSIS — Z78.0 MENOPAUSE: ICD-10-CM

## 2022-11-08 ENCOUNTER — TELEPHONE (OUTPATIENT)
Dept: PRIMARY CARE CLINIC | Facility: CLINIC | Age: 77
End: 2022-11-08
Payer: MEDICARE

## 2022-11-09 ENCOUNTER — OFFICE VISIT (OUTPATIENT)
Dept: OPHTHALMOLOGY | Facility: CLINIC | Age: 77
End: 2022-11-09
Payer: MEDICARE

## 2022-11-09 ENCOUNTER — OFFICE VISIT (OUTPATIENT)
Dept: PRIMARY CARE CLINIC | Facility: CLINIC | Age: 77
End: 2022-11-09
Payer: MEDICARE

## 2022-11-09 VITALS
OXYGEN SATURATION: 97 % | BODY MASS INDEX: 24.45 KG/M2 | WEIGHT: 138 LBS | HEIGHT: 63 IN | TEMPERATURE: 99 F | SYSTOLIC BLOOD PRESSURE: 130 MMHG | DIASTOLIC BLOOD PRESSURE: 68 MMHG | HEART RATE: 88 BPM

## 2022-11-09 DIAGNOSIS — E03.9 ACQUIRED HYPOTHYROIDISM: ICD-10-CM

## 2022-11-09 DIAGNOSIS — E11.42 CONTROLLED TYPE 2 DIABETES MELLITUS WITH DIABETIC POLYNEUROPATHY, WITHOUT LONG-TERM CURRENT USE OF INSULIN: Primary | ICD-10-CM

## 2022-11-09 DIAGNOSIS — J45.20 MILD INTERMITTENT ASTHMA WITHOUT COMPLICATION: ICD-10-CM

## 2022-11-09 DIAGNOSIS — Z91.199 NON-COMPLIANCE: ICD-10-CM

## 2022-11-09 DIAGNOSIS — N18.31 STAGE 3A CHRONIC KIDNEY DISEASE: ICD-10-CM

## 2022-11-09 DIAGNOSIS — H40.1131 PRIMARY OPEN ANGLE GLAUCOMA OF BOTH EYES, MILD STAGE: Primary | ICD-10-CM

## 2022-11-09 DIAGNOSIS — E11.59 HYPERTENSION ASSOCIATED WITH DIABETES: ICD-10-CM

## 2022-11-09 DIAGNOSIS — I15.2 HYPERTENSION ASSOCIATED WITH DIABETES: ICD-10-CM

## 2022-11-09 LAB
ANION GAP SERPL CALC-SCNC: 12 MMOL/L (ref 8–16)
BUN SERPL-MCNC: 19 MG/DL (ref 8–23)
CALCIUM SERPL-MCNC: 9.6 MG/DL (ref 8.7–10.5)
CHLORIDE SERPL-SCNC: 105 MMOL/L (ref 95–110)
CO2 SERPL-SCNC: 23 MMOL/L (ref 23–29)
CREAT SERPL-MCNC: 1.6 MG/DL (ref 0.5–1.4)
EST. GFR  (NO RACE VARIABLE): 33 ML/MIN/1.73 M^2
ESTIMATED AVG GLUCOSE: 171 MG/DL (ref 68–131)
GLUCOSE SERPL-MCNC: 99 MG/DL (ref 70–110)
HBA1C MFR BLD: 7.6 % (ref 4–5.6)
POTASSIUM SERPL-SCNC: 4.4 MMOL/L (ref 3.5–5.1)
SODIUM SERPL-SCNC: 140 MMOL/L (ref 136–145)

## 2022-11-09 PROCEDURE — 1101F PT FALLS ASSESS-DOCD LE1/YR: CPT | Mod: CPTII,S$GLB,, | Performed by: INTERNAL MEDICINE

## 2022-11-09 PROCEDURE — 3288F PR FALLS RISK ASSESSMENT DOCUMENTED: ICD-10-PCS | Mod: CPTII,S$GLB,, | Performed by: INTERNAL MEDICINE

## 2022-11-09 PROCEDURE — 99214 OFFICE O/P EST MOD 30 MIN: CPT | Mod: S$GLB,,, | Performed by: INTERNAL MEDICINE

## 2022-11-09 PROCEDURE — 99213 PR OFFICE/OUTPT VISIT, EST, LEVL III, 20-29 MIN: ICD-10-PCS | Mod: S$GLB,,, | Performed by: OPHTHALMOLOGY

## 2022-11-09 PROCEDURE — 3288F FALL RISK ASSESSMENT DOCD: CPT | Mod: CPTII,S$GLB,, | Performed by: INTERNAL MEDICINE

## 2022-11-09 PROCEDURE — 3078F DIAST BP <80 MM HG: CPT | Mod: CPTII,S$GLB,, | Performed by: INTERNAL MEDICINE

## 2022-11-09 PROCEDURE — 1126F PR PAIN SEVERITY QUANTIFIED, NO PAIN PRESENT: ICD-10-PCS | Mod: CPTII,S$GLB,, | Performed by: INTERNAL MEDICINE

## 2022-11-09 PROCEDURE — 1159F MED LIST DOCD IN RCRD: CPT | Mod: CPTII,S$GLB,, | Performed by: INTERNAL MEDICINE

## 2022-11-09 PROCEDURE — 99213 OFFICE O/P EST LOW 20 MIN: CPT | Mod: S$GLB,,, | Performed by: OPHTHALMOLOGY

## 2022-11-09 PROCEDURE — 99214 PR OFFICE/OUTPT VISIT, EST, LEVL IV, 30-39 MIN: ICD-10-PCS | Mod: S$GLB,,, | Performed by: INTERNAL MEDICINE

## 2022-11-09 PROCEDURE — 3078F PR MOST RECENT DIASTOLIC BLOOD PRESSURE < 80 MM HG: ICD-10-PCS | Mod: CPTII,S$GLB,, | Performed by: INTERNAL MEDICINE

## 2022-11-09 PROCEDURE — 1159F MED LIST DOCD IN RCRD: CPT | Mod: CPTII,S$GLB,, | Performed by: OPHTHALMOLOGY

## 2022-11-09 PROCEDURE — 83036 HEMOGLOBIN GLYCOSYLATED A1C: CPT | Performed by: INTERNAL MEDICINE

## 2022-11-09 PROCEDURE — 1159F PR MEDICATION LIST DOCUMENTED IN MEDICAL RECORD: ICD-10-PCS | Mod: CPTII,S$GLB,, | Performed by: OPHTHALMOLOGY

## 2022-11-09 PROCEDURE — 99999 PR PBB SHADOW E&M-EST. PATIENT-LVL III: CPT | Mod: PBBFAC,,, | Performed by: INTERNAL MEDICINE

## 2022-11-09 PROCEDURE — 3075F PR MOST RECENT SYSTOLIC BLOOD PRESS GE 130-139MM HG: ICD-10-PCS | Mod: CPTII,S$GLB,, | Performed by: INTERNAL MEDICINE

## 2022-11-09 PROCEDURE — 80048 BASIC METABOLIC PNL TOTAL CA: CPT | Performed by: INTERNAL MEDICINE

## 2022-11-09 PROCEDURE — 99999 PR PBB SHADOW E&M-EST. PATIENT-LVL I: ICD-10-PCS | Mod: PBBFAC,,, | Performed by: OPHTHALMOLOGY

## 2022-11-09 PROCEDURE — 3075F SYST BP GE 130 - 139MM HG: CPT | Mod: CPTII,S$GLB,, | Performed by: INTERNAL MEDICINE

## 2022-11-09 PROCEDURE — 99999 PR PBB SHADOW E&M-EST. PATIENT-LVL III: ICD-10-PCS | Mod: PBBFAC,,, | Performed by: INTERNAL MEDICINE

## 2022-11-09 PROCEDURE — 1160F PR REVIEW ALL MEDS BY PRESCRIBER/CLIN PHARMACIST DOCUMENTED: ICD-10-PCS | Mod: CPTII,S$GLB,, | Performed by: OPHTHALMOLOGY

## 2022-11-09 PROCEDURE — 1126F AMNT PAIN NOTED NONE PRSNT: CPT | Mod: CPTII,S$GLB,, | Performed by: INTERNAL MEDICINE

## 2022-11-09 PROCEDURE — 99999 PR PBB SHADOW E&M-EST. PATIENT-LVL I: CPT | Mod: PBBFAC,,, | Performed by: OPHTHALMOLOGY

## 2022-11-09 PROCEDURE — 1160F RVW MEDS BY RX/DR IN RCRD: CPT | Mod: CPTII,S$GLB,, | Performed by: OPHTHALMOLOGY

## 2022-11-09 PROCEDURE — 1159F PR MEDICATION LIST DOCUMENTED IN MEDICAL RECORD: ICD-10-PCS | Mod: CPTII,S$GLB,, | Performed by: INTERNAL MEDICINE

## 2022-11-09 PROCEDURE — 1101F PR PT FALLS ASSESS DOC 0-1 FALLS W/OUT INJ PAST YR: ICD-10-PCS | Mod: CPTII,S$GLB,, | Performed by: INTERNAL MEDICINE

## 2022-11-09 NOTE — PROGRESS NOTES
HPI     Glaucoma            Comments: Pt reports for 6wk IOP check. Denies any pain or irritation. Va   stable. Taking Brimonidine qd instead of BID.           Comments    DM SINCE 2006  BDR  H/o DME OD  VMA  COAG (NEWLY DX 3/2018)  +FAMHX-GLAUCOMA-BROTHER      Latanoprost caused redness  Timolol caused redness        Brimonidine BID OU            Last edited by Jaspreet Jack on 11/9/2022 10:30 AM.            Assessment /Plan     For exam results, see Encounter Report.      ICD-10-CM ICD-9-CM    1. Primary open angle glaucoma of both eyes, mild stage  H40.1131 365.11 Improvement, though no drops were used this morning. Asked she use drops morning of next visit.      365.71       2. Non-compliance  Z91.199 V15.81 As above           Return to clinic 2 months   Asked she use drops morning of next visit         Brimonidine BID OU

## 2022-11-10 ENCOUNTER — TELEPHONE (OUTPATIENT)
Dept: PRIMARY CARE CLINIC | Facility: CLINIC | Age: 77
End: 2022-11-10
Payer: MEDICARE

## 2022-11-10 DIAGNOSIS — K59.00 CONSTIPATION, UNSPECIFIED CONSTIPATION TYPE: ICD-10-CM

## 2022-11-10 DIAGNOSIS — N18.32 CHRONIC RENAL IMPAIRMENT, STAGE 3B: Primary | ICD-10-CM

## 2022-11-10 RX ORDER — POLYETHYLENE GLYCOL 3350 17 G/17G
POWDER, FOR SOLUTION ORAL
Qty: 510 G | Refills: 3 | Status: SHIPPED | OUTPATIENT
Start: 2022-11-10

## 2022-11-10 NOTE — TELEPHONE ENCOUNTER
Please tell pt kidney function is a little lower on yesterday's lab.  Make sure she is drinking about 6 glasses of water a day, and schedule repeat lab in 1mo.  Keep appt at 3mo.  SM

## 2022-12-14 ENCOUNTER — TELEPHONE (OUTPATIENT)
Dept: OPHTHALMOLOGY | Facility: CLINIC | Age: 77
End: 2022-12-14
Payer: MEDICARE

## 2022-12-30 ENCOUNTER — LAB VISIT (OUTPATIENT)
Dept: LAB | Facility: HOSPITAL | Age: 77
End: 2022-12-30
Attending: INTERNAL MEDICINE
Payer: MEDICARE

## 2022-12-30 DIAGNOSIS — N18.32 CHRONIC RENAL IMPAIRMENT, STAGE 3B: ICD-10-CM

## 2022-12-30 PROCEDURE — 80048 BASIC METABOLIC PNL TOTAL CA: CPT | Performed by: INTERNAL MEDICINE

## 2022-12-30 PROCEDURE — 36415 COLL VENOUS BLD VENIPUNCTURE: CPT | Performed by: INTERNAL MEDICINE

## 2022-12-31 LAB
ANION GAP SERPL CALC-SCNC: 8 MMOL/L (ref 8–16)
BUN SERPL-MCNC: 19 MG/DL (ref 8–23)
CALCIUM SERPL-MCNC: 10 MG/DL (ref 8.7–10.5)
CHLORIDE SERPL-SCNC: 104 MMOL/L (ref 95–110)
CO2 SERPL-SCNC: 27 MMOL/L (ref 23–29)
CREAT SERPL-MCNC: 1.4 MG/DL (ref 0.5–1.4)
EST. GFR  (NO RACE VARIABLE): 38.7 ML/MIN/1.73 M^2
GLUCOSE SERPL-MCNC: 106 MG/DL (ref 70–110)
POTASSIUM SERPL-SCNC: 4.6 MMOL/L (ref 3.5–5.1)
SODIUM SERPL-SCNC: 139 MMOL/L (ref 136–145)

## 2023-01-05 DIAGNOSIS — F41.9 ANXIETY: ICD-10-CM

## 2023-01-05 RX ORDER — SERTRALINE HYDROCHLORIDE 50 MG/1
TABLET, FILM COATED ORAL
Qty: 135 TABLET | Refills: 3 | Status: SHIPPED | OUTPATIENT
Start: 2023-01-05

## 2023-03-23 NOTE — PROGRESS NOTES
"Subjective:      Patient ID: Natalia Mckeon is a 78 y.o. female.    Chief Complaint: Follow-up (Three month follow up. Pt has left knee pain, [facility]      HPI  Here for follow up of medical problems.  AC breakfast sugars 150 range.  BP at home 120/67.  No f/c/sw/cough.  Flonase helps nasal allergies, +/- claritin-D.  No cp/sob/palp.  BMs better now.  Asthma doing well now, no inhalers lately.  Anxiety doing well on zoloft.    Updated/ annual due 7/23:  HM: 10/22 fluvax, 1/21 covid vaccines/booster, 7/15 [id number], 2/18 booster [id number], 6/14 TDaP, 10/14 zostervax at pharmacy, 2019 Shingrix x2, 10/22 mmg/Gyn Dr. Obrien, bmd per her, 3/19 Cscope Dr. Auguste rep 5y, 5/21 Eye Dr. Phillip, 11/16 HCV neg, Cards Dr. Jm Vasquez, Urol Dr. Lira.     Review of Systems   Constitutional:  Negative for chills, diaphoresis and fever.   Respiratory:  Negative for cough and shortness of breath.    Cardiovascular:  Negative for chest pain, palpitations and leg swelling.   Gastrointestinal:  Negative for blood in stool, constipation, diarrhea, nausea and vomiting.   Genitourinary:  Negative for dysuria, frequency and hematuria.   Psychiatric/Behavioral:  The patient is not nervous/anxious.        Objective:   /67   Pulse 67   Temp 98.1 °F (36.7 °C) (Oral)   Ht 5' 2.5" (1.588 m)   Wt 61.6 kg (135 lb 11.2 oz)   SpO2 100%   BMI 24.42 kg/m²     Physical Exam  Constitutional:       Appearance: She is well-developed.   Neck:      Thyroid: No thyroid mass.      Vascular: No carotid bruit.   Cardiovascular:      Rate and Rhythm: Normal rate and regular rhythm.      Heart sounds: No murmur heard.    No friction rub. No gallop.   Pulmonary:      Effort: Pulmonary effort is normal.      Breath sounds: Normal breath sounds. No wheezing or rales.   Abdominal:      General: Bowel sounds are normal.      Palpations: Abdomen is soft. There is no mass.      Tenderness: There is no abdominal " tenderness.   Musculoskeletal:      Cervical back: Neck supple.   Lymphadenopathy:      Cervical: No cervical adenopathy.   Neurological:      Mental Status: She is alert and oriented to person, place, and time.         Assessment:       1. Hypertension associated with diabetes    2. Controlled type 2 diabetes mellitus with diabetic polyneuropathy, without long-term current use of insulin    3. Acquired hypothyroidism    4. Stage 3a chronic kidney disease    5. Mild intermittent asthma without complication    6. Recurrent UTI    7. Anxiety          Plan:     Hypertension associated with diabetes- stable, cont rx and home monitoring.    Controlled type 2 diabetes mellitus with diabetic polyneuropathy, without long-term current use of insulin- recheck now.  -     Hemoglobin A1C; Future; Expected date: 04/06/2023  -     Basic Metabolic Panel; Future; Expected date: 04/06/2023  -     metFORMIN (GLUCOPHAGE) 500 MG tablet; Take 1 tablet (500 mg total) by mouth 2 (two) times daily with meals.  Dispense: 180 tablet; Refill: 3    Acquired hypothyroidism- Clinically stable, continue present treatment.    Stage 3a chronic kidney disease- recheck now.    Mild intermittent asthma without complication, quiet now.    Recurrent UTI, prn for yeast infx:  -     fluconazole (DIFLUCAN) 150 MG Tab; Take 1 tablet (150 mg total) by mouth daily as needed (yeast infection).  Dispense: 5 tablet; Refill: 2    Anxiety- doing well on rx, cont.

## 2023-04-06 ENCOUNTER — LAB VISIT (OUTPATIENT)
Dept: LAB | Facility: HOSPITAL | Age: 78
End: 2023-04-06
Attending: INTERNAL MEDICINE
Payer: MEDICARE

## 2023-04-06 ENCOUNTER — OFFICE VISIT (OUTPATIENT)
Dept: PRIMARY CARE CLINIC | Facility: CLINIC | Age: 78
End: 2023-04-06
Payer: MEDICARE

## 2023-04-06 VITALS
HEIGHT: 63 IN | TEMPERATURE: 98 F | SYSTOLIC BLOOD PRESSURE: 120 MMHG | OXYGEN SATURATION: 100 % | DIASTOLIC BLOOD PRESSURE: 67 MMHG | WEIGHT: 135.69 LBS | HEART RATE: 67 BPM | BODY MASS INDEX: 24.04 KG/M2

## 2023-04-06 DIAGNOSIS — N18.31 STAGE 3A CHRONIC KIDNEY DISEASE: ICD-10-CM

## 2023-04-06 DIAGNOSIS — E03.9 ACQUIRED HYPOTHYROIDISM: ICD-10-CM

## 2023-04-06 DIAGNOSIS — F41.9 ANXIETY: ICD-10-CM

## 2023-04-06 DIAGNOSIS — N39.0 RECURRENT UTI: ICD-10-CM

## 2023-04-06 DIAGNOSIS — E11.42 CONTROLLED TYPE 2 DIABETES MELLITUS WITH DIABETIC POLYNEUROPATHY, WITHOUT LONG-TERM CURRENT USE OF INSULIN: ICD-10-CM

## 2023-04-06 DIAGNOSIS — E11.59 HYPERTENSION ASSOCIATED WITH DIABETES: Primary | ICD-10-CM

## 2023-04-06 DIAGNOSIS — I15.2 HYPERTENSION ASSOCIATED WITH DIABETES: Primary | ICD-10-CM

## 2023-04-06 DIAGNOSIS — J45.20 MILD INTERMITTENT ASTHMA WITHOUT COMPLICATION: ICD-10-CM

## 2023-04-06 LAB
ANION GAP SERPL CALC-SCNC: 11 MMOL/L (ref 8–16)
BUN SERPL-MCNC: 20 MG/DL (ref 8–23)
CALCIUM SERPL-MCNC: 9.2 MG/DL (ref 8.7–10.5)
CHLORIDE SERPL-SCNC: 102 MMOL/L (ref 95–110)
CO2 SERPL-SCNC: 24 MMOL/L (ref 23–29)
CREAT SERPL-MCNC: 1.4 MG/DL (ref 0.5–1.4)
EST. GFR  (NO RACE VARIABLE): 38.5 ML/MIN/1.73 M^2
ESTIMATED AVG GLUCOSE: 163 MG/DL (ref 68–131)
GLUCOSE SERPL-MCNC: 125 MG/DL (ref 70–110)
HBA1C MFR BLD: 7.3 % (ref 4–5.6)
POTASSIUM SERPL-SCNC: 4.6 MMOL/L (ref 3.5–5.1)
SODIUM SERPL-SCNC: 137 MMOL/L (ref 136–145)

## 2023-04-06 PROCEDURE — 99999 PR PBB SHADOW E&M-EST. PATIENT-LVL V: CPT | Mod: PBBFAC,,, | Performed by: INTERNAL MEDICINE

## 2023-04-06 PROCEDURE — 3074F SYST BP LT 130 MM HG: CPT | Mod: CPTII,S$GLB,, | Performed by: INTERNAL MEDICINE

## 2023-04-06 PROCEDURE — 3074F PR MOST RECENT SYSTOLIC BLOOD PRESSURE < 130 MM HG: ICD-10-PCS | Mod: CPTII,S$GLB,, | Performed by: INTERNAL MEDICINE

## 2023-04-06 PROCEDURE — 36415 COLL VENOUS BLD VENIPUNCTURE: CPT | Mod: PO | Performed by: INTERNAL MEDICINE

## 2023-04-06 PROCEDURE — 1101F PT FALLS ASSESS-DOCD LE1/YR: CPT | Mod: CPTII,S$GLB,, | Performed by: INTERNAL MEDICINE

## 2023-04-06 PROCEDURE — 80048 BASIC METABOLIC PNL TOTAL CA: CPT | Performed by: INTERNAL MEDICINE

## 2023-04-06 PROCEDURE — 99214 OFFICE O/P EST MOD 30 MIN: CPT | Mod: S$GLB,,, | Performed by: INTERNAL MEDICINE

## 2023-04-06 PROCEDURE — 3078F PR MOST RECENT DIASTOLIC BLOOD PRESSURE < 80 MM HG: ICD-10-PCS | Mod: CPTII,S$GLB,, | Performed by: INTERNAL MEDICINE

## 2023-04-06 PROCEDURE — 1125F PR PAIN SEVERITY QUANTIFIED, PAIN PRESENT: ICD-10-PCS | Mod: CPTII,S$GLB,, | Performed by: INTERNAL MEDICINE

## 2023-04-06 PROCEDURE — 3288F PR FALLS RISK ASSESSMENT DOCUMENTED: ICD-10-PCS | Mod: CPTII,S$GLB,, | Performed by: INTERNAL MEDICINE

## 2023-04-06 PROCEDURE — 1159F MED LIST DOCD IN RCRD: CPT | Mod: CPTII,S$GLB,, | Performed by: INTERNAL MEDICINE

## 2023-04-06 PROCEDURE — 83036 HEMOGLOBIN GLYCOSYLATED A1C: CPT | Performed by: INTERNAL MEDICINE

## 2023-04-06 PROCEDURE — 99214 PR OFFICE/OUTPT VISIT, EST, LEVL IV, 30-39 MIN: ICD-10-PCS | Mod: S$GLB,,, | Performed by: INTERNAL MEDICINE

## 2023-04-06 PROCEDURE — 3288F FALL RISK ASSESSMENT DOCD: CPT | Mod: CPTII,S$GLB,, | Performed by: INTERNAL MEDICINE

## 2023-04-06 PROCEDURE — 99999 PR PBB SHADOW E&M-EST. PATIENT-LVL V: ICD-10-PCS | Mod: PBBFAC,,, | Performed by: INTERNAL MEDICINE

## 2023-04-06 PROCEDURE — 1159F PR MEDICATION LIST DOCUMENTED IN MEDICAL RECORD: ICD-10-PCS | Mod: CPTII,S$GLB,, | Performed by: INTERNAL MEDICINE

## 2023-04-06 PROCEDURE — 1101F PR PT FALLS ASSESS DOC 0-1 FALLS W/OUT INJ PAST YR: ICD-10-PCS | Mod: CPTII,S$GLB,, | Performed by: INTERNAL MEDICINE

## 2023-04-06 PROCEDURE — 1125F AMNT PAIN NOTED PAIN PRSNT: CPT | Mod: CPTII,S$GLB,, | Performed by: INTERNAL MEDICINE

## 2023-04-06 PROCEDURE — 3078F DIAST BP <80 MM HG: CPT | Mod: CPTII,S$GLB,, | Performed by: INTERNAL MEDICINE

## 2023-04-06 RX ORDER — FLUCONAZOLE 150 MG/1
150 TABLET ORAL DAILY PRN
Qty: 5 TABLET | Refills: 2 | Status: SHIPPED | OUTPATIENT
Start: 2023-04-06 | End: 2024-01-08

## 2023-04-06 RX ORDER — METFORMIN HYDROCHLORIDE 500 MG/1
500 TABLET ORAL 2 TIMES DAILY WITH MEALS
Qty: 180 TABLET | Refills: 3 | Status: SHIPPED | OUTPATIENT
Start: 2023-04-06

## 2023-04-13 ENCOUNTER — TELEPHONE (OUTPATIENT)
Dept: PRIMARY CARE CLINIC | Facility: CLINIC | Age: 78
End: 2023-04-13
Payer: MEDICARE

## 2023-04-21 ENCOUNTER — TELEPHONE (OUTPATIENT)
Dept: PRIMARY CARE CLINIC | Facility: CLINIC | Age: 78
End: 2023-04-21
Payer: MEDICARE

## 2023-04-21 NOTE — TELEPHONE ENCOUNTER
Spoke with patient in attempts to schedule Medicare annual wellness visit with Nurse Practitioner Apple Cooley in Dr. Carlton's office. Patient stated she was not unable to make the appt at the moment, but that she will give us a call back to schedule in July.

## 2023-06-23 NOTE — PROGRESS NOTES
"Subjective:      Patient ID: Natalia Mckeon is a 78 y.o. female.    Chief Complaint: Follow-up (Three month follow up. Pt states she's been under some stress, due to son being in the hospital. )      HPI  Here for f/u medical problems and preventive exam.  BP at home 120-138/60-73.  No exercise lately, as son in ICU.  Does walk 15 min daily.  No f/c/sw/cough.  Allergies good with flonase, uses albuterol prn about once daily, out of advair.  No cp/sob/palp.  BMs normal.  Urine normal.  Anxiety doing well on 75mg zoloft.  AC breakfast sugars 140-170.    HM: 10/22 fluvax, 1/21 covid vaccines/booster, 7/15 [id number], 2/18 booster [id number], 7/20 today [id number], 6/14 TDaP, 10/14 zostervax at pharmacy, 2019 Shingrix x2, 10/22 mmg/Gyn Dr. Obrien, bmd per her, 3/19 Cscope Dr. Auguste rep 5y, 11/22 Eye Dr. Phillip, 11/16 HCV neg, Cards Dr. Jm Vasquez, Urol Dr. Lira.     Review of Systems   Constitutional:  Negative for appetite change, chills, diaphoresis and fever.   HENT:  Negative for congestion, ear pain, rhinorrhea, sinus pressure and sore throat.    Respiratory:  Negative for cough, chest tightness and shortness of breath.    Cardiovascular:  Negative for chest pain and palpitations.   Gastrointestinal:  Negative for blood in stool, constipation, diarrhea, nausea and vomiting.   Genitourinary:  Negative for dysuria, frequency, hematuria, menstrual problem, urgency and vaginal discharge.   Musculoskeletal:  Negative for arthralgias.   Skin:  Negative for rash.   Neurological:  Negative for dizziness and headaches.   Psychiatric/Behavioral:  Negative for sleep disturbance. The patient is not nervous/anxious.        Objective:   /67   Temp 97.9 °F (36.6 °C) (Oral)   Ht 5' 2.5" (1.588 m)   Wt 61.5 kg (135 lb 9.6 oz)   SpO2 99%   BMI 24.41 kg/m²     Physical Exam  Constitutional:       Appearance: She is well-developed.   HENT:      Right Ear: External ear normal. Tympanic membrane is not injected.      " Left Ear: External ear normal. Tympanic membrane is not injected.   Eyes:      Conjunctiva/sclera: Conjunctivae normal.   Neck:      Thyroid: No thyromegaly.   Cardiovascular:      Rate and Rhythm: Normal rate and regular rhythm.      Pulses:           Dorsalis pedis pulses are 2+ on the right side and 2+ on the left side.        Posterior tibial pulses are 2+ on the right side and 2+ on the left side.      Heart sounds: No murmur heard.    No friction rub. No gallop.   Pulmonary:      Effort: Pulmonary effort is normal.      Breath sounds: Normal breath sounds. No wheezing or rales.   Abdominal:      General: Bowel sounds are normal.      Palpations: Abdomen is soft. There is no mass.      Tenderness: There is no abdominal tenderness.   Musculoskeletal:      Cervical back: Normal range of motion and neck supple.   Feet:      Right foot:      Protective Sensation: 10 sites tested.  10 sites sensed.      Skin integrity: No ulcer, blister, skin breakdown, erythema, warmth, callus or dry skin.      Left foot:      Protective Sensation: 10 sites tested.  10 sites sensed.      Skin integrity: No ulcer, blister, skin breakdown, erythema, warmth, callus or dry skin.   Lymphadenopathy:      Cervical: No cervical adenopathy.   Skin:     General: Skin is warm.      Findings: No rash.   Neurological:      Mental Status: She is alert and oriented to person, place, and time.         Assessment:       1. Hyperlipidemia associated with type 2 diabetes mellitus    2. Stage 3a chronic kidney disease    3. Controlled type 2 diabetes mellitus with diabetic polyneuropathy, without long-term current use of insulin    4. Anxiety    5. Acquired hypothyroidism    6. Gastroesophageal reflux disease without esophagitis    7. Hypertension associated with diabetes    8. Mild intermittent asthma without complication    9. Encounter for preventive health examination    10. Type 2 diabetes mellitus with stable proliferative retinopathy of both  eyes, without long-term current use of insulin          Plan:     Hyperlipidemia associated with type 2 diabetes mellitus- cont statin, check lab.  -     CBC Auto Differential; Future; Expected date: 07/07/2023  -     Comprehensive Metabolic Panel; Future; Expected date: 07/07/2023  -     Lipid Panel; Future; Expected date: 07/07/2023  -     TSH; Future; Expected date: 07/07/2023  -     Hemoglobin A1C; Future; Expected date: 07/07/2023  -     Microalbumin/Creatinine Ratio, Urine; Future; Expected date: 07/07/2023    Stage 3a chronic kidney disease- poss jardiance.    Controlled type 2 diabetes mellitus with diabetic polyneuropathy, with stable proliferative retinopathy of both eyes, without long-term current use of insulin- check lab, [id number], poss JARDIANCE.  -     Pneumococcal Conjugate Vaccine (20 Valent) (IM)    Anxiety- doing well, cont rx.    Acquired hypothyroidism- Clinically stable, continue present treatment.    Gastroesophageal reflux disease without esophagitis- stable, cont rx prn.    Hypertension associated with diabetes- stable, cont rx.    Mild intermittent asthma without complication- restart advair.  -     fluticasone-salmeterol diskus inhaler 250-50 mcg; Inhale 1 puff into the lungs 2 (two) times daily.  Dispense: 60 each; Refill: 11    Encounter for preventive health examination    RTC 3mo.

## 2023-06-27 ENCOUNTER — PATIENT OUTREACH (OUTPATIENT)
Dept: ADMINISTRATIVE | Facility: HOSPITAL | Age: 78
End: 2023-06-27
Payer: MEDICARE

## 2023-06-27 NOTE — PROGRESS NOTES
[employer] [facility] HTN: 90 day supply filled 04/13/2023, called Pt to friendly remind to  HTN meds, no answer.

## 2023-07-07 ENCOUNTER — LAB VISIT (OUTPATIENT)
Dept: LAB | Facility: HOSPITAL | Age: 78
End: 2023-07-07
Attending: INTERNAL MEDICINE
Payer: MEDICARE

## 2023-07-07 ENCOUNTER — OFFICE VISIT (OUTPATIENT)
Dept: PRIMARY CARE CLINIC | Facility: CLINIC | Age: 78
End: 2023-07-07
Payer: MEDICARE

## 2023-07-07 VITALS
BODY MASS INDEX: 24.03 KG/M2 | TEMPERATURE: 98 F | OXYGEN SATURATION: 99 % | HEIGHT: 63 IN | WEIGHT: 135.63 LBS | SYSTOLIC BLOOD PRESSURE: 120 MMHG | DIASTOLIC BLOOD PRESSURE: 67 MMHG

## 2023-07-07 DIAGNOSIS — I15.2 HYPERTENSION ASSOCIATED WITH DIABETES: ICD-10-CM

## 2023-07-07 DIAGNOSIS — E78.5 HYPERLIPIDEMIA ASSOCIATED WITH TYPE 2 DIABETES MELLITUS: ICD-10-CM

## 2023-07-07 DIAGNOSIS — Z00.00 ENCOUNTER FOR PREVENTIVE HEALTH EXAMINATION: ICD-10-CM

## 2023-07-07 DIAGNOSIS — E11.42 CONTROLLED TYPE 2 DIABETES MELLITUS WITH DIABETIC POLYNEUROPATHY, WITHOUT LONG-TERM CURRENT USE OF INSULIN: ICD-10-CM

## 2023-07-07 DIAGNOSIS — N18.31 STAGE 3A CHRONIC KIDNEY DISEASE: ICD-10-CM

## 2023-07-07 DIAGNOSIS — F41.9 ANXIETY: ICD-10-CM

## 2023-07-07 DIAGNOSIS — E11.69 HYPERLIPIDEMIA ASSOCIATED WITH TYPE 2 DIABETES MELLITUS: ICD-10-CM

## 2023-07-07 DIAGNOSIS — E03.9 ACQUIRED HYPOTHYROIDISM: ICD-10-CM

## 2023-07-07 DIAGNOSIS — E11.59 HYPERTENSION ASSOCIATED WITH DIABETES: ICD-10-CM

## 2023-07-07 DIAGNOSIS — E78.5 HYPERLIPIDEMIA ASSOCIATED WITH TYPE 2 DIABETES MELLITUS: Primary | ICD-10-CM

## 2023-07-07 DIAGNOSIS — K21.9 GASTROESOPHAGEAL REFLUX DISEASE WITHOUT ESOPHAGITIS: ICD-10-CM

## 2023-07-07 DIAGNOSIS — E11.69 HYPERLIPIDEMIA ASSOCIATED WITH TYPE 2 DIABETES MELLITUS: Primary | ICD-10-CM

## 2023-07-07 DIAGNOSIS — E11.3553 TYPE 2 DIABETES MELLITUS WITH STABLE PROLIFERATIVE RETINOPATHY OF BOTH EYES, WITHOUT LONG-TERM CURRENT USE OF INSULIN: ICD-10-CM

## 2023-07-07 DIAGNOSIS — J45.20 MILD INTERMITTENT ASTHMA WITHOUT COMPLICATION: ICD-10-CM

## 2023-07-07 LAB
ALBUMIN/CREAT UR: 14.5 UG/MG (ref 0–30)
CREAT UR-MCNC: 83 MG/DL (ref 15–325)
MICROALBUMIN UR DL<=1MG/L-MCNC: 12 UG/ML

## 2023-07-07 PROCEDURE — G0009 PNEUMOCOCCAL CONJUGATE VACCINE 20-VALENT: ICD-10-PCS | Mod: S$GLB,,, | Performed by: INTERNAL MEDICINE

## 2023-07-07 PROCEDURE — 3078F DIAST BP <80 MM HG: CPT | Mod: CPTII,S$GLB,, | Performed by: INTERNAL MEDICINE

## 2023-07-07 PROCEDURE — 82570 ASSAY OF URINE CREATININE: CPT | Performed by: INTERNAL MEDICINE

## 2023-07-07 PROCEDURE — 1101F PR PT FALLS ASSESS DOC 0-1 FALLS W/OUT INJ PAST YR: ICD-10-PCS | Mod: CPTII,S$GLB,, | Performed by: INTERNAL MEDICINE

## 2023-07-07 PROCEDURE — 3074F SYST BP LT 130 MM HG: CPT | Mod: CPTII,S$GLB,, | Performed by: INTERNAL MEDICINE

## 2023-07-07 PROCEDURE — 3288F FALL RISK ASSESSMENT DOCD: CPT | Mod: CPTII,S$GLB,, | Performed by: INTERNAL MEDICINE

## 2023-07-07 PROCEDURE — 3288F PR FALLS RISK ASSESSMENT DOCUMENTED: ICD-10-PCS | Mod: CPTII,S$GLB,, | Performed by: INTERNAL MEDICINE

## 2023-07-07 PROCEDURE — 99999 PR PBB SHADOW E&M-EST. PATIENT-LVL V: ICD-10-PCS | Mod: PBBFAC,,, | Performed by: INTERNAL MEDICINE

## 2023-07-07 PROCEDURE — 99214 PR OFFICE/OUTPT VISIT, EST, LEVL IV, 30-39 MIN: ICD-10-PCS | Mod: 25,S$GLB,, | Performed by: INTERNAL MEDICINE

## 2023-07-07 PROCEDURE — 90677 PNEUMOCOCCAL CONJUGATE VACCINE 20-VALENT: ICD-10-PCS | Mod: S$GLB,,, | Performed by: INTERNAL MEDICINE

## 2023-07-07 PROCEDURE — G0009 ADMIN PNEUMOCOCCAL VACCINE: HCPCS | Mod: S$GLB,,, | Performed by: INTERNAL MEDICINE

## 2023-07-07 PROCEDURE — 3078F PR MOST RECENT DIASTOLIC BLOOD PRESSURE < 80 MM HG: ICD-10-PCS | Mod: CPTII,S$GLB,, | Performed by: INTERNAL MEDICINE

## 2023-07-07 PROCEDURE — 1159F PR MEDICATION LIST DOCUMENTED IN MEDICAL RECORD: ICD-10-PCS | Mod: CPTII,S$GLB,, | Performed by: INTERNAL MEDICINE

## 2023-07-07 PROCEDURE — 1159F MED LIST DOCD IN RCRD: CPT | Mod: CPTII,S$GLB,, | Performed by: INTERNAL MEDICINE

## 2023-07-07 PROCEDURE — 99999 PR PBB SHADOW E&M-EST. PATIENT-LVL V: CPT | Mod: PBBFAC,,, | Performed by: INTERNAL MEDICINE

## 2023-07-07 PROCEDURE — 90677 PCV20 VACCINE IM: CPT | Mod: S$GLB,,, | Performed by: INTERNAL MEDICINE

## 2023-07-07 PROCEDURE — 3074F PR MOST RECENT SYSTOLIC BLOOD PRESSURE < 130 MM HG: ICD-10-PCS | Mod: CPTII,S$GLB,, | Performed by: INTERNAL MEDICINE

## 2023-07-07 PROCEDURE — 1101F PT FALLS ASSESS-DOCD LE1/YR: CPT | Mod: CPTII,S$GLB,, | Performed by: INTERNAL MEDICINE

## 2023-07-07 PROCEDURE — 99214 OFFICE O/P EST MOD 30 MIN: CPT | Mod: 25,S$GLB,, | Performed by: INTERNAL MEDICINE

## 2023-07-07 RX ORDER — FLUTICASONE PROPIONATE AND SALMETEROL 250; 50 UG/1; UG/1
1 POWDER RESPIRATORY (INHALATION) 2 TIMES DAILY
Qty: 60 EACH | Refills: 11 | Status: SHIPPED | OUTPATIENT
Start: 2023-07-07 | End: 2023-10-12 | Stop reason: SDUPTHER

## 2023-07-31 NOTE — TELEPHONE ENCOUNTER
Chief Complaint  Extremity Weakness, Diabetes, Hypertension, Atrial Fibrillation, and Coronary Artery Disease    Subjective     CC  Problem List  Visit Diagnosis   Encounters  Notes  Medications  Labs  Result Review Imaging  Media    Mendel Melendez presents to [facility] for   History of Present Illness  Friday possibly got over heated. Saturday was exhausted all day. Had a fever on Saturday. No fever Sunday or today. Urinary frequency with little output. He is concerned because he continued to have moderate fatigue. He is eating well taking fluids and urinating regularly.   Diabetes  He presents for his follow-up diabetic visit. He has type 2 diabetes mellitus. No MedicAlert identification noted. The initial diagnosis of diabetes was made 8 years ago. His disease course has been improving. There are no hypoglycemic complications. Symptoms are stable. Risk factors for coronary artery disease include dyslipidemia, diabetes mellitus, hypertension, male sex, family history and obesity. Current diabetic treatment includes oral agent (monotherapy). He is compliant with treatment most of the time. His weight is fluctuating minimally. He is following a diabetic and generally healthy diet. Meal planning includes avoidance of concentrated sweets. He has not had a previous visit with a dietitian. He participates in exercise daily. His home blood glucose trend is fluctuating minimally. His overall blood glucose range is 130-140 mg/dl. An ACE inhibitor/angiotensin II receptor blocker is not being taken. He does not see a podiatrist.Eye exam is current ([facility]).   Hypertension  This is a chronic problem. The current episode started more than 1 year ago. The problem has been waxing and waning since onset. Pertinent negatives include no peripheral edema. Risk factors for coronary artery disease include diabetes mellitus, dyslipidemia, male gender, obesity and family history. Current  Spoke with pt and informed her that her lab orders have been put in for to have done, with understanding.   "antihypertension treatment includes calcium channel blockers and beta blockers. The current treatment provides mild improvement. There are no compliance problems.    Coronary Artery Disease  Presents for follow-up visit. The symptoms have been stable. Compliance with diet is good. Compliance with exercise is good. Compliance with medications is good.   Atrial Fibrillation  Presents for follow-up visit. Symptoms are negative for an AICD problem, bradycardia, hypotension, pacemaker problem and tachycardia. The symptoms have been stable. There are no medication compliance problems.   Difficulty Urinating  This is a new problem. The current episode started in the past 7 days (friday night 07/28/2023). The problem occurs constantly. The problem has been unchanged. Associated symptoms include urinary symptoms. Nothing aggravates the symptoms. He has tried nothing for the symptoms.   Review of Systems     Objective   Vital Signs:   /72 (BP Location: Left arm, Patient Position: Sitting, Cuff Size: Adult)   Pulse 105   Temp 97.5 øF (36.4 øC) (Infrared)   Resp 18   Ht 165.1 cm (65\")   Wt 98.5 kg (217 lb 3.2 oz)   SpO2 98% Comment: room air  BMI 36.14 kg/mý     Physical Exam  Constitutional:       General: He is not in acute distress.     Comments: Well hydrated   Cardiovascular:      Rate and Rhythm: Normal rate and regular rhythm.      Heart sounds: No murmur heard.  Pulmonary:      Effort: Pulmonary effort is normal.   Abdominal:      General: Abdomen is flat. Bowel sounds are normal.      Palpations: Abdomen is soft. There is no mass.      Tenderness: There is no abdominal tenderness.   Musculoskeletal:      Right lower leg: No edema.      Left lower leg: No edema.   Lymphadenopathy:      Cervical: No cervical adenopathy.   Skin:     Findings: No rash.   Neurological:      Mental Status: He is alert.      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 His anatomy is challenging w/o ability to have a perfect fix.    Angina much improved but continues and unchanged. He continues cardiology follow up  He is considering new cardiologist given [facility] exit.   Risk factors modified         Relevant Medications    nitroglycerin (NITROSTAT) 0.4 MG SL tablet    Type 2 diabetes mellitus with diabetic peripheral angiopathy without gangrene, without long-term current use of insulin    Overview     A1c 6.9 06/16/2023 improved, he reports no hypo or hyperglycemia.   A1c 7.2 03/03/2023  A1c 6.8 11/28/2022   A1c 6.6 09/24/2021 improved.   A1c 7.2 10/06/2020   A1c 7.3 11/18/2019     A1c 6.9 5/7/2019   A1c 7.0 02/12/2018    A1c 7.1, 11/1/2018 new onset          Other Visit Diagnoses       History of heat exhaustion    -  Primary    neg exam, He should continue to push fluids, rest avoid excessive heat and follow up should sxs not continue to improve and resolve.    Urinary frequency        neg exam neg u/a    Relevant Orders    POCT urinalysis dipstick, manual (Completed)            Follow Up Instructions Charge Capture  Follow-up Communications  Return in  about 3 months (around 10/31/2023), or if symptoms worsen or fail to improve.  Patient was given instructions and counseling regarding his condition or for health maintenance advice. Please see specific information pulled into the AVS if appropriate.

## 2023-08-04 DIAGNOSIS — E11.42 CONTROLLED TYPE 2 DIABETES MELLITUS WITH DIABETIC POLYNEUROPATHY, WITHOUT LONG-TERM CURRENT USE OF INSULIN: ICD-10-CM

## 2023-08-05 ENCOUNTER — HOSPITAL ENCOUNTER (EMERGENCY)
Facility: HOSPITAL | Age: 78
Discharge: HOME | End: 2023-08-05
Attending: FAMILY MEDICINE
Payer: MEDICARE

## 2023-08-05 VITALS
TEMPERATURE: 99 F | DIASTOLIC BLOOD PRESSURE: 77 MMHG | SYSTOLIC BLOOD PRESSURE: 138 MMHG | BODY MASS INDEX: 24.84 KG/M2 | HEIGHT: 62 IN | OXYGEN SATURATION: 100 % | RESPIRATION RATE: 15 BRPM | HEART RATE: 70 BPM | WEIGHT: 135 LBS

## 2023-08-05 DIAGNOSIS — S09.90XA INJURY OF HEAD, INITIAL ENCOUNTER: Primary | ICD-10-CM
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
## 2023-08-05 DIAGNOSIS — M25.562 LEFT KNEE PAIN: ICD-10-CM

## 2023-08-05 PROCEDURE — 99284 EMERGENCY DEPT VISIT MOD MDM: CPT | Mod: 25
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
## 2023-09-26 ENCOUNTER — OFFICE VISIT (OUTPATIENT)
Dept: OPHTHALMOLOGY | Facility: CLINIC | Age: 78
End: 2023-09-26
Payer: MEDICARE

## 2023-09-26 DIAGNOSIS — E11.3553 TYPE 2 DIABETES MELLITUS WITH STABLE PROLIFERATIVE RETINOPATHY OF BOTH EYES, WITHOUT LONG-TERM CURRENT USE OF INSULIN: ICD-10-CM

## 2023-09-26 DIAGNOSIS — H04.123 DRY EYE SYNDROME OF BOTH EYES: ICD-10-CM

## 2023-09-26 DIAGNOSIS — H40.1131 PRIMARY OPEN ANGLE GLAUCOMA OF BOTH EYES, MILD STAGE: ICD-10-CM

## 2023-09-26 DIAGNOSIS — H40.1131 PRIMARY OPEN ANGLE GLAUCOMA OF BOTH EYES, MILD STAGE: Primary | ICD-10-CM

## 2023-09-26 DIAGNOSIS — E11.36 DIABETIC CATARACT OF BOTH EYES: ICD-10-CM

## 2023-09-26 DIAGNOSIS — Z91.199 NON-COMPLIANCE: ICD-10-CM

## 2023-09-26 PROCEDURE — 1159F PR MEDICATION LIST DOCUMENTED IN MEDICAL RECORD: ICD-10-PCS | Mod: CPTII,S$GLB,, | Performed by: OPHTHALMOLOGY

## 2023-09-26 PROCEDURE — 92020 GONIOSCOPY: CPT | Mod: S$GLB,,, | Performed by: OPHTHALMOLOGY

## 2023-09-26 PROCEDURE — 1160F PR REVIEW ALL MEDS BY PRESCRIBER/CLIN PHARMACIST DOCUMENTED: ICD-10-PCS | Mod: CPTII,S$GLB,, | Performed by: OPHTHALMOLOGY

## 2023-09-26 PROCEDURE — 92020 PR SPECIAL EYE EVAL,GONIOSCOPY: ICD-10-PCS | Mod: S$GLB,,, | Performed by: OPHTHALMOLOGY

## 2023-09-26 PROCEDURE — 99214 PR OFFICE/OUTPT VISIT, EST, LEVL IV, 30-39 MIN: ICD-10-PCS | Mod: S$GLB,,, | Performed by: OPHTHALMOLOGY

## 2023-09-26 PROCEDURE — 1159F MED LIST DOCD IN RCRD: CPT | Mod: CPTII,S$GLB,, | Performed by: OPHTHALMOLOGY

## 2023-09-26 PROCEDURE — 2023F PR DILATED RETINAL EXAM W/O EVID OF RETINOPATHY: ICD-10-PCS | Mod: CPTII,S$GLB,, | Performed by: OPHTHALMOLOGY

## 2023-09-26 PROCEDURE — 1160F RVW MEDS BY RX/DR IN RCRD: CPT | Mod: CPTII,S$GLB,, | Performed by: OPHTHALMOLOGY

## 2023-09-26 PROCEDURE — 99999 PR PBB SHADOW E&M-EST. PATIENT-LVL IV: CPT | Mod: PBBFAC,,, | Performed by: OPHTHALMOLOGY

## 2023-09-26 PROCEDURE — 1126F PR PAIN SEVERITY QUANTIFIED, NO PAIN PRESENT: ICD-10-PCS | Mod: CPTII,S$GLB,, | Performed by: OPHTHALMOLOGY

## 2023-09-26 PROCEDURE — 99214 OFFICE O/P EST MOD 30 MIN: CPT | Mod: S$GLB,,, | Performed by: OPHTHALMOLOGY

## 2023-09-26 PROCEDURE — 99999 PR PBB SHADOW E&M-EST. PATIENT-LVL IV: ICD-10-PCS | Mod: PBBFAC,,, | Performed by: OPHTHALMOLOGY

## 2023-09-26 PROCEDURE — 2023F DILAT RTA XM W/O RTNOPTHY: CPT | Mod: CPTII,S$GLB,, | Performed by: OPHTHALMOLOGY

## 2023-09-26 PROCEDURE — 1126F AMNT PAIN NOTED NONE PRSNT: CPT | Mod: CPTII,S$GLB,, | Performed by: OPHTHALMOLOGY

## 2023-09-26 RX ORDER — LATANOPROST 50 UG/ML
1 SOLUTION/ DROPS OPHTHALMIC DAILY
Qty: 2.5 ML | Refills: 3 | Status: SHIPPED | OUTPATIENT
Start: 2023-09-26

## 2023-09-26 RX ORDER — BRIMONIDINE TARTRATE 2 MG/ML
1 SOLUTION/ DROPS OPHTHALMIC 2 TIMES DAILY
Qty: 15 ML | Refills: 3 | Status: SHIPPED | OUTPATIENT
Start: 2023-09-26 | End: 2024-09-25

## 2023-09-26 NOTE — PROGRESS NOTES
HPI     Glaucoma            Comments: Lost in follow up been a year. Vision  is doing well, no   changes. No pain or irritation, pt only using meds  1 x daily and not BID   as directed     Diagnosed with diabetes in 2006  Lab Results       Component                Value               Date                       HGBA1C                   7.4 (H)             07/07/2023                             Comments    DM SINCE 2006  BDR  H/o DME OD  VMA  COAG (NEWLY DX 3/2018)  +FAMHX-GLAUCOMA-BROTHER      Latanoprost caused redness  Timolol caused redness        Brimonidine BID OU            Last edited by Jaspreet Phillip MD on 9/26/2023 10:40 AM.            Assessment /Plan     For exam results, see Encounter Report.      ICD-10-CM ICD-9-CM    1. Primary open angle glaucoma of both eyes, mild stage [H40.1131]  H40.1131 365.11 Intraocular pressure is not within an acceptable range relative to target pressure. Increases risk of irreversible visual loss due to inadequate compliance discussed. Lengthy discussion regarding importance of absolute compliance with treatment regimen.    Discussed options, risks, and benefits of additional medication, SLT laser, or incisional glaucoma surgery without improved compliance.     Patient chooses resuming meds BID dosing and adding latanoprost        365.71       2. Type 2 diabetes mellitus with stable proliferative retinopathy of both eyes, without long-term current use of insulin  E11.3553 250.50 Diabetes with no diabetic retinopathy on dilated exam.   Reviewed diabetic eye precautions including excellent blood sugar control, and importance of regular follow up.          362.02       3. Diabetic cataract of both eyes  E11.36 250.50 You were found to have an early cataract in your eye(s) today. However the cataract is not affecting your activities of daily living, such as reading and driving. You do not need surgery at this time. We will recheck your cataract at your next visit. You are  welcome to call for an earlier appointment if your vision gets worse.        366.41       4. Dry eye syndrome of both eyes  H04.123 375.15 Continue tears       5. Primary open angle glaucoma of both eyes, mild stage  H40.1131 365.11 latanoprost 0.005 % ophthalmic solution     365.71 brimonidine 0.2% (ALPHAGAN) 0.2 % Drop     see above       6. Non-compliance  Z91.199 V15.81 Encourage compliance and proper follow up           Return to clinic 3-4 weeks with GOCT      Latanoprost QHS OU   Brimonidine BID OU

## 2023-10-04 NOTE — PROGRESS NOTES
Subjective:      Patient ID: Natalia Mckeon is a 78 y.o. female.    Chief Complaint: Follow-up (Three month follow up. Pt is on antibiotics for urinary tract infection. Pt is requesting medication for knee pain. Pt needs test strips and lancets. )      \[facility]\""  Here for follow up of medical problems.  BP at home 120-137/70s.  Breathing well on advair only prn.  AC breakfast sugars 130 range.  No f/c/sw/cough.  No cp/sob/palp.  BMs normal.  Taking crestor only every other day.  On doxy 100mg daily for recurrent UTIs, Urol Dr. Lira.  Taking naproxen daily for over a year.      The 10-year ASCVD risk score (Cesar COLINDRES, et al., 2019) is: 35.3%    Values used to calculate the score:      Age: 78 years      Sex: Female      Is Non- : Yes      Diabetic: Yes      Tobacco smoker: No      Systolic Blood Pressure: 127 mmHg      Is BP treated: No      HDL Cholesterol: 57 mg/dL      Total Cholesterol: 157 mg/dL      Advance Care Planning     Date: 10/12/2023    Power of   I initiated the process of voluntary advance care planning today and explained the importance of this process to the patient.  I introduced the concept of advance directives to the patient, as well. Then the patient received detailed information about the importance of designating a Health Care Power of  (HCPOA). She was also instructed to communicate with this person about their wishes for future healthcare, should she become sick and lose decision-making capacity. The patient has previously appointed a HCPOA. After our discussion, the patient has decided to complete a HCPOA and has appointed her  , daughters , health care agent:  Sherwin Mckeon Jr, Angel Sinclair  & health care agent number:  [phone], [phone], [phone].              Updated/ annual due 7/24:  HM: 10/23 fluvax, 1/21 covid vaccines/booster, 7/15 [id number], 2/18 booster [id number], 7/20 [id number], 6/14 TDaP, 10/14 zostervax at  "pharmacy, 2019 Shingrix x2, 11/23 sched mmg/Gyn Dr. Obrien, bmd per her, 3/19 Cscope Dr. Auguste rep 5y, 11/22 Eye Dr. Phillip, 11/16 HCV neg, Cards Dr. Jm Vasquez, Urol Dr. Lira.     Review of Systems   Constitutional:  Negative for chills, diaphoresis and fever.   Respiratory:  Negative for cough and shortness of breath.    Cardiovascular:  Negative for chest pain, palpitations and leg swelling.   Gastrointestinal:  Negative for blood in stool, constipation, diarrhea, nausea and vomiting.   Genitourinary:  Negative for dysuria, frequency and hematuria.   Psychiatric/Behavioral:  The patient is not nervous/anxious.          Objective:   /72   Pulse 77   Temp 98.8 °F (37.1 °C) (Oral)   Ht 5' 2" (1.575 m)   Wt 61.8 kg (136 lb 4.8 oz)   SpO2 97%   BMI 24.93 kg/m²     Physical Exam  Constitutional:       Appearance: She is well-developed.   Neck:      Thyroid: No thyroid mass.      Vascular: No carotid bruit.   Cardiovascular:      Rate and Rhythm: Normal rate and regular rhythm.      Heart sounds: No murmur heard.     No friction rub. No gallop.   Pulmonary:      Effort: Pulmonary effort is normal.      Breath sounds: Normal breath sounds. No wheezing or rales.   Abdominal:      General: Bowel sounds are normal.      Palpations: Abdomen is soft. There is no mass.      Tenderness: There is no abdominal tenderness.   Musculoskeletal:      Cervical back: Neck supple.   Lymphadenopathy:      Cervical: No cervical adenopathy.   Neurological:      Mental Status: She is alert and oriented to person, place, and time.             Assessment:       1. Mild intermittent asthma without complication    2. Hypertension associated with diabetes    3. Controlled type 2 diabetes mellitus with diabetic polyneuropathy, without long-term current use of insulin    4. Hyperlipidemia associated with type 2 diabetes mellitus    5. Stage 3b chronic kidney disease          Plan:     Natalia was seen today for " follow-up.    Diagnoses and all orders for this visit:    Mild intermittent asthma without complication  -     fluticasone-salmeterol diskus inhaler 250-50 mcg; Inhale 1 puff into the lungs 2 (two) times daily.    Hypertension associated with diabetes- stable, cont rx.    Controlled type 2 diabetes mellitus with diabetic polyneuropathy, without long-term current use of insulin- POSS ADD JARDIANCE after lab in 2wk, recheck 3mo.  -     Hemoglobin A1C; Future    Hyperlipidemia associated with type 2 diabetes mellitus- now take crestor daily, recheck 3mo.  -     Lipid Panel; Future    Stage 3b chronic kidney disease- STOP NSAIDs, all.  Recheck lab in 2wk.  -     Basic Metabolic Panel; Future

## 2023-10-12 ENCOUNTER — OFFICE VISIT (OUTPATIENT)
Dept: PRIMARY CARE CLINIC | Facility: CLINIC | Age: 78
End: 2023-10-12
Payer: MEDICARE

## 2023-10-12 VITALS
DIASTOLIC BLOOD PRESSURE: 72 MMHG | WEIGHT: 136.31 LBS | HEIGHT: 62 IN | BODY MASS INDEX: 25.08 KG/M2 | HEART RATE: 77 BPM | SYSTOLIC BLOOD PRESSURE: 127 MMHG | OXYGEN SATURATION: 97 % | TEMPERATURE: 99 F

## 2023-10-12 DIAGNOSIS — E78.5 HYPERLIPIDEMIA ASSOCIATED WITH TYPE 2 DIABETES MELLITUS: ICD-10-CM

## 2023-10-12 DIAGNOSIS — J45.20 MILD INTERMITTENT ASTHMA WITHOUT COMPLICATION: Primary | ICD-10-CM

## 2023-10-12 DIAGNOSIS — E11.42 CONTROLLED TYPE 2 DIABETES MELLITUS WITH DIABETIC POLYNEUROPATHY, WITHOUT LONG-TERM CURRENT USE OF INSULIN: ICD-10-CM

## 2023-10-12 DIAGNOSIS — N18.32 STAGE 3B CHRONIC KIDNEY DISEASE: ICD-10-CM

## 2023-10-12 DIAGNOSIS — E11.59 HYPERTENSION ASSOCIATED WITH DIABETES: ICD-10-CM

## 2023-10-12 DIAGNOSIS — E11.69 HYPERLIPIDEMIA ASSOCIATED WITH TYPE 2 DIABETES MELLITUS: ICD-10-CM

## 2023-10-12 DIAGNOSIS — I15.2 HYPERTENSION ASSOCIATED WITH DIABETES: ICD-10-CM

## 2023-10-12 PROCEDURE — 90694 FLU VACCINE - QUADRIVALENT - ADJUVANTED: ICD-10-PCS | Mod: S$GLB,,, | Performed by: INTERNAL MEDICINE

## 2023-10-12 PROCEDURE — 3288F PR FALLS RISK ASSESSMENT DOCUMENTED: ICD-10-PCS | Mod: CPTII,S$GLB,, | Performed by: INTERNAL MEDICINE

## 2023-10-12 PROCEDURE — 3078F DIAST BP <80 MM HG: CPT | Mod: CPTII,S$GLB,, | Performed by: INTERNAL MEDICINE

## 2023-10-12 PROCEDURE — 1101F PT FALLS ASSESS-DOCD LE1/YR: CPT | Mod: CPTII,S$GLB,, | Performed by: INTERNAL MEDICINE

## 2023-10-12 PROCEDURE — 99213 OFFICE O/P EST LOW 20 MIN: CPT | Mod: S$GLB,,, | Performed by: INTERNAL MEDICINE

## 2023-10-12 PROCEDURE — 3074F PR MOST RECENT SYSTOLIC BLOOD PRESSURE < 130 MM HG: ICD-10-PCS | Mod: CPTII,S$GLB,, | Performed by: INTERNAL MEDICINE

## 2023-10-12 PROCEDURE — G0008 ADMIN INFLUENZA VIRUS VAC: HCPCS | Mod: S$GLB,,, | Performed by: INTERNAL MEDICINE

## 2023-10-12 PROCEDURE — 90694 VACC AIIV4 NO PRSRV 0.5ML IM: CPT | Mod: S$GLB,,, | Performed by: INTERNAL MEDICINE

## 2023-10-12 PROCEDURE — 1101F PR PT FALLS ASSESS DOC 0-1 FALLS W/OUT INJ PAST YR: ICD-10-PCS | Mod: CPTII,S$GLB,, | Performed by: INTERNAL MEDICINE

## 2023-10-12 PROCEDURE — 3078F PR MOST RECENT DIASTOLIC BLOOD PRESSURE < 80 MM HG: ICD-10-PCS | Mod: CPTII,S$GLB,, | Performed by: INTERNAL MEDICINE

## 2023-10-12 PROCEDURE — 99213 PR OFFICE/OUTPT VISIT, EST, LEVL III, 20-29 MIN: ICD-10-PCS | Mod: S$GLB,,, | Performed by: INTERNAL MEDICINE

## 2023-10-12 PROCEDURE — G0008 FLU VACCINE - QUADRIVALENT - ADJUVANTED: ICD-10-PCS | Mod: S$GLB,,, | Performed by: INTERNAL MEDICINE

## 2023-10-12 PROCEDURE — 1159F MED LIST DOCD IN RCRD: CPT | Mod: CPTII,S$GLB,, | Performed by: INTERNAL MEDICINE

## 2023-10-12 PROCEDURE — 1159F PR MEDICATION LIST DOCUMENTED IN MEDICAL RECORD: ICD-10-PCS | Mod: CPTII,S$GLB,, | Performed by: INTERNAL MEDICINE

## 2023-10-12 PROCEDURE — 99999 PR PBB SHADOW E&M-EST. PATIENT-LVL III: CPT | Mod: PBBFAC,,, | Performed by: INTERNAL MEDICINE

## 2023-10-12 PROCEDURE — 99999 PR PBB SHADOW E&M-EST. PATIENT-LVL III: ICD-10-PCS | Mod: PBBFAC,,, | Performed by: INTERNAL MEDICINE

## 2023-10-12 PROCEDURE — 3074F SYST BP LT 130 MM HG: CPT | Mod: CPTII,S$GLB,, | Performed by: INTERNAL MEDICINE

## 2023-10-12 PROCEDURE — 3288F FALL RISK ASSESSMENT DOCD: CPT | Mod: CPTII,S$GLB,, | Performed by: INTERNAL MEDICINE

## 2023-10-12 RX ORDER — FLUTICASONE PROPIONATE AND SALMETEROL 250; 50 UG/1; UG/1
1 POWDER RESPIRATORY (INHALATION) 2 TIMES DAILY
Qty: 60 EACH | Refills: 11 | Status: SHIPPED | OUTPATIENT
Start: 2023-10-12 | End: 2024-10-11

## 2023-10-25 ENCOUNTER — LAB VISIT (OUTPATIENT)
Dept: LAB | Facility: HOSPITAL | Age: 78
End: 2023-10-25
Attending: INTERNAL MEDICINE
Payer: MEDICARE

## 2023-10-25 DIAGNOSIS — N18.32 STAGE 3B CHRONIC KIDNEY DISEASE: ICD-10-CM

## 2023-10-25 LAB
ANION GAP SERPL CALC-SCNC: 9 MMOL/L (ref 8–16)
BUN SERPL-MCNC: 21 MG/DL (ref 8–23)
CALCIUM SERPL-MCNC: 9.4 MG/DL (ref 8.7–10.5)
CHLORIDE SERPL-SCNC: 104 MMOL/L (ref 95–110)
CO2 SERPL-SCNC: 25 MMOL/L (ref 23–29)
CREAT SERPL-MCNC: 1.3 MG/DL (ref 0.5–1.4)
EST. GFR  (NO RACE VARIABLE): 42.1 ML/MIN/1.73 M^2
GLUCOSE SERPL-MCNC: 145 MG/DL (ref 70–110)
POTASSIUM SERPL-SCNC: 4.6 MMOL/L (ref 3.5–5.1)
SODIUM SERPL-SCNC: 138 MMOL/L (ref 136–145)

## 2023-10-25 PROCEDURE — 80048 BASIC METABOLIC PNL TOTAL CA: CPT | Performed by: INTERNAL MEDICINE

## 2023-10-25 PROCEDURE — 36415 COLL VENOUS BLD VENIPUNCTURE: CPT | Mod: PO | Performed by: INTERNAL MEDICINE

## 2023-11-17 DIAGNOSIS — E11.42 CONTROLLED TYPE 2 DIABETES MELLITUS WITH DIABETIC POLYNEUROPATHY, WITHOUT LONG-TERM CURRENT USE OF INSULIN: ICD-10-CM

## 2023-11-17 RX ORDER — INSULIN PUMP SYRINGE, 3 ML
EACH MISCELLANEOUS
Qty: 1 EACH | Refills: 0 | Status: SHIPPED | OUTPATIENT
Start: 2023-11-17 | End: 2024-11-16

## 2024-01-08 ENCOUNTER — OFFICE VISIT (OUTPATIENT)
Dept: PRIMARY CARE CLINIC | Facility: CLINIC | Age: 79
End: 2024-01-08
Payer: MEDICARE

## 2024-01-08 VITALS
WEIGHT: 136.13 LBS | OXYGEN SATURATION: 98 % | HEIGHT: 62 IN | SYSTOLIC BLOOD PRESSURE: 127 MMHG | TEMPERATURE: 98 F | HEART RATE: 66 BPM | DIASTOLIC BLOOD PRESSURE: 76 MMHG | BODY MASS INDEX: 25.05 KG/M2

## 2024-01-08 DIAGNOSIS — R10.13 DYSPEPSIA: ICD-10-CM

## 2024-01-08 DIAGNOSIS — F51.01 PRIMARY INSOMNIA: ICD-10-CM

## 2024-01-08 DIAGNOSIS — E11.42 CONTROLLED TYPE 2 DIABETES MELLITUS WITH DIABETIC POLYNEUROPATHY, WITHOUT LONG-TERM CURRENT USE OF INSULIN: ICD-10-CM

## 2024-01-08 DIAGNOSIS — I15.2 HYPERTENSION ASSOCIATED WITH DIABETES: Primary | ICD-10-CM

## 2024-01-08 DIAGNOSIS — E11.59 HYPERTENSION ASSOCIATED WITH DIABETES: Primary | ICD-10-CM

## 2024-01-08 DIAGNOSIS — E11.3553 TYPE 2 DIABETES MELLITUS WITH STABLE PROLIFERATIVE RETINOPATHY OF BOTH EYES, WITHOUT LONG-TERM CURRENT USE OF INSULIN: ICD-10-CM

## 2024-01-08 DIAGNOSIS — N18.32 STAGE 3B CHRONIC KIDNEY DISEASE: ICD-10-CM

## 2024-01-08 DIAGNOSIS — E11.69 HYPERLIPIDEMIA ASSOCIATED WITH TYPE 2 DIABETES MELLITUS: ICD-10-CM

## 2024-01-08 DIAGNOSIS — E78.5 HYPERLIPIDEMIA ASSOCIATED WITH TYPE 2 DIABETES MELLITUS: ICD-10-CM

## 2024-01-08 LAB
ALBUMIN SERPL BCP-MCNC: 4.2 G/DL (ref 3.5–5.2)
ALP SERPL-CCNC: 87 U/L (ref 55–135)
ALT SERPL W/O P-5'-P-CCNC: 17 U/L (ref 10–44)
ANION GAP SERPL CALC-SCNC: 9 MMOL/L (ref 8–16)
AST SERPL-CCNC: 21 U/L (ref 10–40)
BILIRUB SERPL-MCNC: 0.5 MG/DL (ref 0.1–1)
BUN SERPL-MCNC: 19 MG/DL (ref 8–23)
CALCIUM SERPL-MCNC: 9.7 MG/DL (ref 8.7–10.5)
CHLORIDE SERPL-SCNC: 104 MMOL/L (ref 95–110)
CHOLEST SERPL-MCNC: 163 MG/DL (ref 120–199)
CHOLEST/HDLC SERPL: 2.4 {RATIO} (ref 2–5)
CO2 SERPL-SCNC: 26 MMOL/L (ref 23–29)
CREAT SERPL-MCNC: 1.3 MG/DL (ref 0.5–1.4)
EST. GFR  (NO RACE VARIABLE): 42.1 ML/MIN/1.73 M^2
GLUCOSE SERPL-MCNC: 155 MG/DL (ref 70–110)
HDLC SERPL-MCNC: 68 MG/DL (ref 40–75)
HDLC SERPL: 41.7 % (ref 20–50)
LDLC SERPL CALC-MCNC: 84.2 MG/DL (ref 63–159)
NONHDLC SERPL-MCNC: 95 MG/DL
POTASSIUM SERPL-SCNC: 5.1 MMOL/L (ref 3.5–5.1)
PROT SERPL-MCNC: 7.7 G/DL (ref 6–8.4)
SODIUM SERPL-SCNC: 139 MMOL/L (ref 136–145)
TRIGL SERPL-MCNC: 54 MG/DL (ref 30–150)

## 2024-01-08 PROCEDURE — 80053 COMPREHEN METABOLIC PANEL: CPT | Performed by: FAMILY MEDICINE

## 2024-01-08 PROCEDURE — 1159F MED LIST DOCD IN RCRD: CPT | Mod: CPTII,S$GLB,, | Performed by: FAMILY MEDICINE

## 2024-01-08 PROCEDURE — 99999 PR PBB SHADOW E&M-EST. PATIENT-LVL V: CPT | Mod: PBBFAC,,, | Performed by: FAMILY MEDICINE

## 2024-01-08 PROCEDURE — 82043 UR ALBUMIN QUANTITATIVE: CPT | Performed by: FAMILY MEDICINE

## 2024-01-08 PROCEDURE — 80061 LIPID PANEL: CPT | Performed by: INTERNAL MEDICINE

## 2024-01-08 PROCEDURE — 3072F LOW RISK FOR RETINOPATHY: CPT | Mod: CPTII,S$GLB,, | Performed by: FAMILY MEDICINE

## 2024-01-08 PROCEDURE — 3074F SYST BP LT 130 MM HG: CPT | Mod: CPTII,S$GLB,, | Performed by: FAMILY MEDICINE

## 2024-01-08 PROCEDURE — 1160F RVW MEDS BY RX/DR IN RCRD: CPT | Mod: CPTII,S$GLB,, | Performed by: FAMILY MEDICINE

## 2024-01-08 PROCEDURE — 3078F DIAST BP <80 MM HG: CPT | Mod: CPTII,S$GLB,, | Performed by: FAMILY MEDICINE

## 2024-01-08 PROCEDURE — 1101F PT FALLS ASSESS-DOCD LE1/YR: CPT | Mod: CPTII,S$GLB,, | Performed by: FAMILY MEDICINE

## 2024-01-08 PROCEDURE — 83036 HEMOGLOBIN GLYCOSYLATED A1C: CPT | Performed by: INTERNAL MEDICINE

## 2024-01-08 PROCEDURE — 1157F ADVNC CARE PLAN IN RCRD: CPT | Mod: CPTII,S$GLB,, | Performed by: FAMILY MEDICINE

## 2024-01-08 PROCEDURE — 3288F FALL RISK ASSESSMENT DOCD: CPT | Mod: CPTII,S$GLB,, | Performed by: FAMILY MEDICINE

## 2024-01-08 PROCEDURE — 99214 OFFICE O/P EST MOD 30 MIN: CPT | Mod: S$GLB,,, | Performed by: FAMILY MEDICINE

## 2024-01-08 PROCEDURE — 1126F AMNT PAIN NOTED NONE PRSNT: CPT | Mod: CPTII,S$GLB,, | Performed by: FAMILY MEDICINE

## 2024-01-08 RX ORDER — TRAZODONE HYDROCHLORIDE 100 MG/1
100 TABLET ORAL NIGHTLY
Qty: 30 TABLET | Refills: 11 | Status: SHIPPED | OUTPATIENT
Start: 2024-01-08 | End: 2025-01-07

## 2024-01-08 RX ORDER — OMEPRAZOLE 40 MG/1
40 CAPSULE, DELAYED RELEASE ORAL DAILY
Qty: 90 CAPSULE | Refills: 3 | Status: SHIPPED | OUTPATIENT
Start: 2024-01-08

## 2024-01-08 NOTE — PROGRESS NOTES
Subjective:       Patient ID: Natalia Mckeon is a 78 y.o. female.    Chief Complaint: Follow-up (3 month f/u pt has some RX that need refills)    HPI:     Here for follow up of medical problems. Had attempted cscope w termination due to tachycardia.  BP at home 120-137/70s.  Breathing well on advair only prn.  AC breakfast sugars 130 range.  No f/c/sw/cough.  No cp/sob/palp.  BMs normal.  Taking crestor only every other day. Significant hearing loss; seeing audiology and has maximized hearing aid potential she says  On doxy 100mg daily for recurrent UTIs, Urol Dr. Lira.  Taking naproxen daily for over a year.      The 10-year ASCVD risk score (Cesar COLINDRES, et al., 2019) is: 35.3%    Values used to calculate the score:      Age: 78 years      Sex: Female      Is Non- : Yes      Diabetic: Yes      Tobacco smoker: No      Systolic Blood Pressure: 127 mmHg      Is BP treated: No      HDL Cholesterol: 57 mg/dL      Total Cholesterol: 157 mg/dL      Advance Care Planning     Date: 10/12/2023    Power of   I initiated the process of voluntary advance care planning today and explained the importance of this process to the patient.  I introduced the concept of advance directives to the patient, as well. Then the patient received detailed information about the importance of designating a Health Care Power of  (HCPOA). She was also instructed to communicate with this person about their wishes for future healthcare, should she become sick and lose decision-making capacity. The patient has previously appointed a HCPOA. After our discussion, the patient has decided to complete a HCPOA and has appointed her , daughters, health care agent: Sherwin Mckeon Jr, Angel Sinclair & health care agent number: [phone], [phone], [phone].             Updated/ annual due 7/24:  HM: 10/23 fluvax, 1/21 covid vaccines/booster, 7/15 [id number], 2/18 booster [id number], 7/20  [id number], 6/14 TDaP, 10/14 zostervax at pharmacy, 2019 Shingrix x2, 11/23 sched mmg/Gyn Dr. Obrien, bmd per her, 3/19 Cscope Dr. Auguste rep 5y, 11/22 Eye Dr. Phillip, 11/16 HCV neg, Cards Dr. Jm Vasquez, Urol Dr. Lira.       Objective:     Vitals:    01/08/24 1112   BP: 127/76   Pulse: 66   Temp: 98.2 °F (36.8 °C)        Physical Exam  Vitals and nursing note reviewed.   Constitutional:       Appearance: She is well-developed.   HENT:      Head: Normocephalic and atraumatic.   Eyes:      Pupils: Pupils are equal, round, and reactive to light.   Cardiovascular:      Rate and Rhythm: Normal rate and regular rhythm.   Pulmonary:      Effort: Pulmonary effort is normal.      Breath sounds: Normal breath sounds.   Musculoskeletal:         General: No deformity. Normal range of motion.      Cervical back: Normal range of motion and neck supple.   Skin:     General: Skin is warm and dry.   Neurological:      Mental Status: She is alert and oriented to person, place, and time.   Psychiatric:         Mood and Affect: Mood normal.         Behavior: Behavior normal.         Thought Content: Thought content normal.           Albumin   Date Value Ref Range Status   01/08/2024 4.2 3.5 - 5.2 g/dL Final     eGFR   Date Value Ref Range Status   01/08/2024 42.1 (A) >60 mL/min/1.73 m^2 Final      Latest Reference Range & Units 10/25/23 12:06   Sodium 136 - 145 mmol/L 138   Potassium 3.5 - 5.1 mmol/L 4.6   Chloride 95 - 110 mmol/L 104   CO2 23 - 29 mmol/L 25   Anion Gap 8 - 16 mmol/L 9   BUN 8 - 23 mg/dL 21   Creatinine 0.5 - 1.4 mg/dL 1.3   eGFR >60 mL/min/1.73 m^2 42.1 !   Glucose 70 - 110 mg/dL 145 (H)   Calcium 8.7 - 10.5 mg/dL 9.4   !: Data is abnormal  (H): Data is abnormally high   Latest Reference Range & Units 01/08/24 11:44   Sodium 136 - 145 mmol/L 139   Potassium 3.5 - 5.1 mmol/L 5.1   Chloride 95 - 110 mmol/L 104   CO2 23 - 29 mmol/L 26   Anion Gap 8 - 16 mmol/L 9   BUN 8 - 23 mg/dL 19   Creatinine 0.5 - 1.4 mg/dL  1.3   eGFR >60 mL/min/1.73 m^2 42.1 !   Glucose 70 - 110 mg/dL 155 (H)   Calcium 8.7 - 10.5 mg/dL 9.7   ALP 55 - 135 U/L 87   PROTEIN TOTAL 6.0 - 8.4 g/dL 7.7   Albumin 3.5 - 5.2 g/dL 4.2   BILIRUBIN TOTAL 0.1 - 1.0 mg/dL 0.5   AST 10 - 40 U/L 21   ALT 10 - 44 U/L 17   Cholesterol Total 120 - 199 mg/dL 163   HDL 40 - 75 mg/dL 68   HDL/Cholesterol Ratio 20.0 - 50.0 % 41.7   Non-HDL Cholesterol mg/dL 95   Total Cholesterol/HDL Ratio 2.0 - 5.0  2.4   Triglycerides 30 - 150 mg/dL 54   LDL Cholesterol 63.0 - 159.0 mg/dL 84.2   Hemoglobin A1C External 4.0 - 5.6 % 7.5 (H)   Estimated Avg Glucose 68 - 131 mg/dL 169 (H)   !: Data is abnormal  (H): Data is abnormally high  Assessment:       1. Hypertension associated with diabetes    2. Primary insomnia    3. Controlled type 2 diabetes mellitus with diabetic polyneuropathy, without long-term current use of insulin    4. Hyperlipidemia associated with type 2 diabetes mellitus    5. Dyspepsia    6. Type 2 diabetes mellitus with stable proliferative retinopathy of both eyes, without long-term current use of insulin    7. Stage 3b chronic kidney disease        Plan:           Problem List Items Addressed This Visit          Cardiac/Vascular    Hypertension associated with diabetes - Primary    Relevant Orders    COMPREHENSIVE METABOLIC PANEL (Completed)    Hyperlipidemia associated with type 2 diabetes mellitus    Current Assessment & Plan     Lab Results   Component Value Date    LDLCALC 84.2 01/08/2024   Goal <70  Encouraged daily use of statin            Renal/    Stage 3b chronic kidney disease    Current Assessment & Plan     BMP  Lab Results   Component Value Date     01/08/2024    K 5.1 01/08/2024     01/08/2024    CO2 26 01/08/2024    BUN 19 01/08/2024    CREATININE 1.3 01/08/2024    CALCIUM 9.7 01/08/2024    ANIONGAP 9 01/08/2024    EGFRNORACEVR 42.1 (A) 01/08/2024               Endocrine    Controlled type 2 diabetes mellitus with diabetic polyneuropathy,  without long-term current use of insulin    Current Assessment & Plan     Lab Results   Component Value Date    HGBA1C 7.5 (H) 01/08/2024            Relevant Medications    blood sugar diagnostic (CONTOUR NEXT TEST STRIPS) Strp    Other Relevant Orders    Microalbumin/creatinine urine ratio (Completed)    Type 2 diabetes mellitus with stable proliferative retinopathy of both eyes, without long-term current use of insulin     Other Visit Diagnoses       Primary insomnia        Relevant Medications    traZODone (DESYREL) 100 MG tablet    Dyspepsia        Relevant Medications    omeprazole (PRILOSEC) 40 MG capsule          Discussed holding omeprazole due to decline in renal function but she expressed desire to use as needed. Not taking NSAIDs. Will set up 3 month f/u with Dr. Carlton. Communication difficult during our visit due to hearing loss.

## 2024-01-08 NOTE — Clinical Note
Will you let her know her cholesterol is a little higher than goal. Advise daily statin use (was taking every other day). Kidney function stable. No NSAIDS; omeprazole only rarely as needed--pepcid a better alternative for reflux when it comes to kidney functions. I had trouble communicating with her yesterday.

## 2024-01-08 NOTE — PATIENT INSTRUCTIONS
If you are feeling unwell, we'd like to be the first ones to know here at [facility]! Please give us a call. Same day appointments are our top priority to keep you well and out of the emergency rooms and hospitals. Call [phone] for our direct line. After hours advice is always available. Please call [phone] after hours to speak to the on-call team.     Try to stop Tylenol PM which is associated with memory problems and increased falls in older people. I'll send in a prescription to take instead if you have trouble sleeping which is not habit forming, called trazodone. Take it as needed about 30 minutes before bed.     Vaccines recommended:  RSV  COVID19 booster  Both of these are available at your local pharmacy

## 2024-01-09 LAB
ALBUMIN/CREAT UR: 17.1 UG/MG (ref 0–30)
CREAT UR-MCNC: 35 MG/DL (ref 15–325)
ESTIMATED AVG GLUCOSE: 169 MG/DL (ref 68–131)
HBA1C MFR BLD: 7.5 % (ref 4–5.6)
MICROALBUMIN UR DL<=1MG/L-MCNC: 6 UG/ML

## 2024-01-09 NOTE — ASSESSMENT & PLAN NOTE
Lab Results   Component Value Date    LDLCALC 84.2 01/08/2024     Goal <70  Encouraged daily use of statin

## 2024-01-09 NOTE — ASSESSMENT & PLAN NOTE
BMP  Lab Results   Component Value Date     01/08/2024    K 5.1 01/08/2024     01/08/2024    CO2 26 01/08/2024    BUN 19 01/08/2024    CREATININE 1.3 01/08/2024    CALCIUM 9.7 01/08/2024    ANIONGAP 9 01/08/2024    EGFRNORACEVR 42.1 (A) 01/08/2024

## 2024-01-10 ENCOUNTER — TELEPHONE (OUTPATIENT)
Dept: PRIMARY CARE CLINIC | Facility: CLINIC | Age: 79
End: 2024-01-10
Payer: MEDICARE

## 2024-01-10 NOTE — TELEPHONE ENCOUNTER
Spoke with patient in regards to lab results and informed that cholesterol is a little higher than goal. Advise daily statin use (was taking every other day). Kidney function stable. No NSAIDS; omeprazole only rarely as needed--pepcid a better alternative for reflux when it comes to kidney functions. ADITYA Jama, RN

## 2024-02-20 DIAGNOSIS — E03.9 ACQUIRED HYPOTHYROIDISM: ICD-10-CM

## 2024-02-20 RX ORDER — LEVOTHYROXINE SODIUM 25 UG/1
25 TABLET ORAL DAILY
Qty: 90 TABLET | Refills: 1 | Status: SHIPPED | OUTPATIENT
Start: 2024-02-20

## 2024-02-20 NOTE — TELEPHONE ENCOUNTER
Refill Decision Note   Natalia Mckeon  is requesting a refill authorization.  Brief Assessment and Rationale for Refill:  Approve     Medication Therapy Plan:         Comments:     Note composed:8:02 AM 02/20/2024

## 2024-02-20 NOTE — TELEPHONE ENCOUNTER
No care due was identified.  [facility] Embedded Care Due Messages. Reference number: [id number].   2/20/2024 4:18:10 AM CST

## 2024-04-01 NOTE — PROGRESS NOTES
"Subjective:      Patient ID: Natalia Mckeon is a 79 y.o. female.    Chief Complaint: Follow-up (3 month f/u)      HPI  Here for follow up of medical problems.  BP at home 132/65.  AC breakfast sugars 140s.  Breathing well on advair.  Walking more, and doing aerobics.  No cp/sob/palp.  BMs good on miralax.    Updated/ annual due 7/24:  HM: 10/23 fluvax, 1/21 covid vaccines/booster, 7/15 [id number], 2/18 booster [id number], 7/20 [id number], 6/14 TDaP, 10/14 zostervax at pharmacy, 2019 Shingrix x2, 11/23 MMG/Gyn Dr. Obrien, bmd per her, 3/19 Cscope Dr. Auguste rep 5y, 9/23 Eye Dr. Phillip, 11/16 HCV neg, Cards Dr. Jm Vasquez, Urol Dr. Lira.     Review of Systems   Constitutional:  Negative for chills, diaphoresis and fever.   Respiratory:  Negative for cough and shortness of breath.    Cardiovascular:  Negative for chest pain, palpitations and leg swelling.   Gastrointestinal:  Negative for blood in stool, constipation, diarrhea, nausea and vomiting.   Genitourinary:  Negative for dysuria, frequency and hematuria.   Psychiatric/Behavioral:  The patient is not nervous/anxious.          Objective:   /65   Pulse 72   Temp 98.3 °F (36.8 °C) (Oral)   Ht 5' 2" (1.575 m)   Wt 64.1 kg (141 lb 5 oz)   SpO2 96%   BMI 25.85 kg/m²     Physical Exam  Constitutional:       Appearance: She is well-developed.   Neck:      Thyroid: No thyroid mass.      Vascular: No carotid bruit.   Cardiovascular:      Rate and Rhythm: Normal rate and regular rhythm.      Heart sounds: No murmur heard.     No friction rub. No gallop.   Pulmonary:      Effort: Pulmonary effort is normal.      Breath sounds: Normal breath sounds. No wheezing or rales.   Abdominal:      General: Bowel sounds are normal.      Palpations: Abdomen is soft. There is no mass.      Tenderness: There is no abdominal tenderness.   Musculoskeletal:      Cervical back: Neck supple.   Lymphadenopathy:      Cervical: No cervical adenopathy.   Neurological:      Mental " Status: She is alert and oriented to person, place, and time.            Latest Reference Range & Units 01/08/24 11:44 01/08/24 11:47   Sodium 136 - 145 mmol/L 139    Potassium 3.5 - 5.1 mmol/L 5.1    Chloride 95 - 110 mmol/L 104    CO2 23 - 29 mmol/L 26    Anion Gap 8 - 16 mmol/L 9    BUN 8 - 23 mg/dL 19    Creatinine 0.5 - 1.4 mg/dL 1.3    eGFR >60 mL/min/1.73 m^2 42.1 !    Glucose 70 - 110 mg/dL 155 (H)    Calcium 8.7 - 10.5 mg/dL 9.7    ALP 55 - 135 U/L 87    PROTEIN TOTAL 6.0 - 8.4 g/dL 7.7    Albumin 3.5 - 5.2 g/dL 4.2    BILIRUBIN TOTAL 0.1 - 1.0 mg/dL 0.5    AST 10 - 40 U/L 21    ALT 10 - 44 U/L 17    Cholesterol Total 120 - 199 mg/dL 163    HDL 40 - 75 mg/dL 68    HDL/Cholesterol Ratio 20.0 - 50.0 % 41.7    Non-HDL Cholesterol mg/dL 95    Total Cholesterol/HDL Ratio 2.0 - 5.0  2.4    Triglycerides 30 - 150 mg/dL 54    LDL Cholesterol 63.0 - 159.0 mg/dL 84.2    Hemoglobin A1C External 4.0 - 5.6 % 7.5 (H)    Estimated Avg Glucose 68 - 131 mg/dL 169 (H)    Urine Creatinine 15.0 - 325.0 mg/dL  35.0   Urine Microalbumin ug/mL  6.0   MICROALB/CREAT RATIO 0.0 - 30.0 ug/mg  17.1     Assessment:       1. Hypertension associated with diabetes    2. Controlled type 2 diabetes mellitus with diabetic polyneuropathy, without long-term current use of insulin    3. Hyperlipidemia associated with type 2 diabetes mellitus    4. Mild intermittent asthma without complication    5. Stage 3b chronic kidney disease    6. Type 2 diabetes mellitus with stable proliferative retinopathy of both eyes, without long-term current use of insulin    7. Anxiety          Plan:     Hypertension associated with diabetes- stable, cont rx.    Anxiety- much better on zoloft.    Controlled type 2 diabetes mellitus with diabetic polyneuropathy, with stable proliferative retinopathy of both eyes- stop metformin, try jardiance, recheck labs in 3mo.    Hyperlipidemia associated with type 2 diabetes mellitus- increase crestor to 20mg, recheck 3mo.  -      rosuvastatin (CRESTOR) 20 MG tablet; Take 1 tablet (20 mg total) by mouth once daily.  Dispense: 90 tablet; Refill: 3  -     CBC Auto Differential; Future; Expected date: 04/11/2024  -     Comprehensive Metabolic Panel; Future; Expected date: 04/11/2024  -     Lipid Panel; Future; Expected date: 04/11/2024  -     TSH; Future; Expected date: 04/11/2024  -     Hemoglobin A1C; Future; Expected date: 04/11/2024  -     Microalbumin/Creatinine Ratio, Urine; Future; Expected date: 04/11/2024    Mild intermittent asthma without complication- doing well, cont rx.    Stage 3b chronic kidney disease- knows to only take tylenol prn pain.    Tdap and HAV at pharmacy.

## 2024-04-04 ENCOUNTER — OFFICE VISIT (OUTPATIENT)
Dept: FAMILY MEDICINE | Facility: CLINIC | Age: 79
End: 2024-04-04
Payer: MEDICARE

## 2024-04-04 VITALS
RESPIRATION RATE: 18 BRPM | HEART RATE: 76 BPM | WEIGHT: 139.75 LBS | SYSTOLIC BLOOD PRESSURE: 136 MMHG | OXYGEN SATURATION: 96 % | DIASTOLIC BLOOD PRESSURE: 70 MMHG | HEIGHT: 62 IN | BODY MASS INDEX: 25.72 KG/M2 | TEMPERATURE: 97 F

## 2024-04-04 DIAGNOSIS — H40.1131 PRIMARY OPEN ANGLE GLAUCOMA OF BOTH EYES, MILD STAGE: ICD-10-CM

## 2024-04-04 DIAGNOSIS — M17.12 PRIMARY OSTEOARTHRITIS OF LEFT KNEE: ICD-10-CM

## 2024-04-04 DIAGNOSIS — F41.9 ANXIETY: ICD-10-CM

## 2024-04-04 DIAGNOSIS — K21.9 GASTROESOPHAGEAL REFLUX DISEASE WITHOUT ESOPHAGITIS: ICD-10-CM

## 2024-04-04 DIAGNOSIS — E03.9 ACQUIRED HYPOTHYROIDISM: ICD-10-CM

## 2024-04-04 DIAGNOSIS — E55.9 VITAMIN D DEFICIENCY: ICD-10-CM

## 2024-04-04 DIAGNOSIS — E11.59 HYPERTENSION ASSOCIATED WITH DIABETES: ICD-10-CM

## 2024-04-04 DIAGNOSIS — E11.42 CONTROLLED TYPE 2 DIABETES MELLITUS WITH DIABETIC POLYNEUROPATHY, WITHOUT LONG-TERM CURRENT USE OF INSULIN: ICD-10-CM

## 2024-04-04 DIAGNOSIS — Z00.00 ENCOUNTER FOR PREVENTIVE HEALTH EXAMINATION: Primary | ICD-10-CM

## 2024-04-04 DIAGNOSIS — J45.20 MILD INTERMITTENT ASTHMA WITHOUT COMPLICATION: ICD-10-CM

## 2024-04-04 DIAGNOSIS — E11.3553 TYPE 2 DIABETES MELLITUS WITH STABLE PROLIFERATIVE RETINOPATHY OF BOTH EYES, WITHOUT LONG-TERM CURRENT USE OF INSULIN: ICD-10-CM

## 2024-04-04 DIAGNOSIS — N39.0 RECURRENT UTI: ICD-10-CM

## 2024-04-04 DIAGNOSIS — I15.2 HYPERTENSION ASSOCIATED WITH DIABETES: ICD-10-CM

## 2024-04-04 DIAGNOSIS — E78.5 HYPERLIPIDEMIA, UNSPECIFIED HYPERLIPIDEMIA TYPE: ICD-10-CM

## 2024-04-04 DIAGNOSIS — N18.32 STAGE 3B CHRONIC KIDNEY DISEASE: ICD-10-CM

## 2024-04-04 DIAGNOSIS — H90.3 SENSORINEURAL HEARING LOSS (SNHL) OF BOTH EARS: ICD-10-CM

## 2024-04-04 PROCEDURE — 1101F PT FALLS ASSESS-DOCD LE1/YR: CPT | Mod: CPTII,S$GLB,, | Performed by: NURSE PRACTITIONER

## 2024-04-04 PROCEDURE — 3075F SYST BP GE 130 - 139MM HG: CPT | Mod: CPTII,S$GLB,, | Performed by: NURSE PRACTITIONER

## 2024-04-04 PROCEDURE — 99999 PR PBB SHADOW E&M-EST. PATIENT-LVL IV: CPT | Mod: PBBFAC,,, | Performed by: NURSE PRACTITIONER

## 2024-04-04 PROCEDURE — G0439 PPPS, SUBSEQ VISIT: HCPCS | Mod: S$GLB,,, | Performed by: NURSE PRACTITIONER

## 2024-04-04 PROCEDURE — 3078F DIAST BP <80 MM HG: CPT | Mod: CPTII,S$GLB,, | Performed by: NURSE PRACTITIONER

## 2024-04-04 PROCEDURE — 3288F FALL RISK ASSESSMENT DOCD: CPT | Mod: CPTII,S$GLB,, | Performed by: NURSE PRACTITIONER

## 2024-04-04 PROCEDURE — 1126F AMNT PAIN NOTED NONE PRSNT: CPT | Mod: CPTII,S$GLB,, | Performed by: NURSE PRACTITIONER

## 2024-04-04 PROCEDURE — 1157F ADVNC CARE PLAN IN RCRD: CPT | Mod: CPTII,S$GLB,, | Performed by: NURSE PRACTITIONER

## 2024-04-04 PROCEDURE — 3072F LOW RISK FOR RETINOPATHY: CPT | Mod: CPTII,S$GLB,, | Performed by: NURSE PRACTITIONER

## 2024-04-04 PROCEDURE — 1170F FXNL STATUS ASSESSED: CPT | Mod: CPTII,S$GLB,, | Performed by: NURSE PRACTITIONER

## 2024-04-04 PROCEDURE — 1159F MED LIST DOCD IN RCRD: CPT | Mod: CPTII,S$GLB,, | Performed by: NURSE PRACTITIONER

## 2024-04-04 PROCEDURE — 1160F RVW MEDS BY RX/DR IN RCRD: CPT | Mod: CPTII,S$GLB,, | Performed by: NURSE PRACTITIONER

## 2024-04-04 NOTE — PROGRESS NOTES
"  Natalia Mckeon presented for a follow-up Medicare AWV today. The following components were reviewed and updated:    Medical history  Family History  Social history  Allergies and Current Medications  Health Risk Assessment  Health Maintenance  Care Team    **See Completed Assessments for Annual Wellness visit with in the encounter summary    The following assessments were completed:  Depression Screening  Cognitive function Screening  Timed Get Up Test  Whisper Test      Opioid documentation:      Patient does not have a current opioid prescription.          Vitals:    04/04/24 0942   BP: 136/70   Pulse: 76   Resp: 18   Temp: 97.1 °F (36.2 °C)   SpO2: 96%   Weight: 63.4 kg (139 lb 12.4 oz)   Height: 5' 2" (1.575 m)     Body mass index is 25.56 kg/m².       Physical Exam  Constitutional:       Appearance: Normal appearance.   HENT:      Head: Normocephalic and atraumatic.   Cardiovascular:      Rate and Rhythm: Normal rate and regular rhythm.   Pulmonary:      Effort: Pulmonary effort is normal.      Breath sounds: Normal breath sounds.   Musculoskeletal:         General: Normal range of motion.      Cervical back: Normal range of motion.      Left knee: Tenderness present.   Neurological:      General: No focal deficit present.      Mental Status: She is alert and oriented to person, place, and time.   Psychiatric:         Attention and Perception: Attention normal.         Mood and Affect: Mood normal.         Speech: Speech normal.         Behavior: Behavior is cooperative.         Thought Content: Thought content normal.         Cognition and Memory: Cognition normal.         Judgment: Judgment normal.         Current Outpatient Medications   Medication Instructions    ACCU-CHEK COMPACT PLUS TEST Strp TEST THREE TIMES DAILY    aspirin 81 mg, Oral, Every other day    azelastine (ASTELIN) 137 mcg (0.1 %) nasal spray 2 sprays, 2 times daily    benzonatate (TESSALON) 100 MG capsule Oral    blood sugar diagnostic " (CONTOUR NEXT TEST STRIPS) Strp CHECK BLOOD SUGAR TWICE DAILY    blood-glucose meter kit To check BG 2 times daily, to use with insurance preferred meter  Dx:  E11.9    brimonidine 0.2% (ALPHAGAN) 0.2 % Drop 1 drop, Both Eyes, 2 times daily    CALCIUM CARBONATE (CALCIUM 300 ORAL) Oral    cephALEXin (KEFLEX) 250 mg, Oral, Daily    cranberry conc-C-bacillus coag 250-30-50 mg-mg-million Tab Oral    cyanocobalamin, vitamin B-12, 5,000 mcg Subl Place under the tongue.    fluticasone (FLONASE) 50 mcg/actuation nasal spray 2 sprays, Each Nostril, Daily    fluticasone-salmeterol diskus inhaler 250-50 mcg 1 puff, Inhalation, 2 times daily    ipratropium (ATROVENT) 0.03 % nasal spray 2 sprays, Nasal, 2 times daily    ketorolac 0.5% (ACULAR) 0.5 % Drop ketorolac 0.5 % eye drops    lancets Misc To check BG 2 times daily, to use with insurance preferred meter  Dx:  E11.9    latanoprost 0.005 % ophthalmic solution INSTILL 1 DROP IN BOTH EYES EVERY EVENING    latanoprost 0.005 % ophthalmic solution 1 drop, Both Eyes, Daily    levocetirizine (XYZAL) 5 MG tablet levocetirizine 5 mg tablet    levothyroxine (SYNTHROID) 25 mcg, Oral, Daily    metFORMIN (GLUCOPHAGE) 500 mg, Oral, 2 times daily with meals    olmesartan-amLODIPin-hcthiazid 40-10-12.5 mg Tab TAKE 1 TABLET BY MOUTH EVERY DAY    omeprazole (PRILOSEC) 40 mg, Oral, Daily, For acid reflux    PHENAZOPYRIDINE HCL (AZO ORAL) Take by mouth.    polyethylene glycol (GLYCOLAX) 17 gram/dose powder MIX 17 GRAMS ACCORDING TO PACKAGE DIRECTIONS AND DRINK ONCE DAILY    potassium chloride SA (K-DUR,KLOR-CON) 20 MEQ tablet TAKE 1 TABLET(20 MEQ) BY MOUTH EVERY DAY    rosuvastatin (CRESTOR) 10 MG tablet TAKE 1 TABLET(10 MG) BY MOUTH EVERY DAY    sertraline (ZOLOFT) 50 MG tablet TAKE 1 AND 1/2 TABLETS(75 MG) BY MOUTH EVERY EVENING    traZODone (DESYREL) 100 mg, Oral, Nightly       Diagnoses and health risks identified today and associated recommendations/orders:  1. Encounter for preventive  health examination  Reviewed  the need for RSV vaccine and Covid    2. Type 2 diabetes mellitus with stable proliferative retinopathy of both eyes, without long-term current use of insulin  myoglobin A1C 4.0 - 5.6 % 7.5 High  7.4 High  CM 7.3 High  CM 7.6 High  CM 7.5 High     Chronic and Ongoing on Metformin, diet indexers. Continue current treatment plan as previously prescribed with your PCP    3. Controlled type 2 diabetes mellitus with diabetic polyneuropathy, without long-term current use of insulin  Chronic and Ongoing on Metformin, diet and exercise. Continue current treatment plan as previously prescribed with your PCP    4. Stage 3 chronic kidney disease  GFR >60 mL/min/1.73 am^2 42.1 Abnormal  42.1 Abnormal  42.1 Abnormal  38.5 Abnormal    Chronic and Stable- Mointered Continue current treatment plan as previously prescribed with your PCP    5. Hypertension associated with diabetes  Chronic and Stable on Lotrel. Continue current treatment plan as previously prescribed with your PCP    6. Primary open angle glaucoma of both eyes, mild stage  Chronic and Stable IOP with drops. Continue current treatment plan as previously prescribed with your opth    7. Mild intermittent asthma without complication  Chronic and Stable on ADVAR and  Albuterol. Continue current treatment plan as previously prescribed with your PCP    8. Gastroesophageal reflux disease without esophagitis  Chronic and Stable on Prioles Continue current treatment plan as previously prescribed with your PCP    9. Sensorineural hearing loss (SNHL) of both ears  Chronic and Stable with hearing aides.  Followed by audiologist    10. Acquired hypothyroidism  Chronic and Stable on  Synthriod. Continue current treatment plan as previously prescribed with your PCP    11. Anxiety  Chronic and Stable on ZOLOFT. Continue current treatment plan as previously prescribed with your PCP    12. Primary osteoarthritis of left knee  Chronic and Ongoing with  Tylenol ES. Continue current treatment plan as previously prescribed with your ortho    13. Recurrent UTI  Chronic and Stable on Keflex as needed. Continue current treatment plan as previously prescribed with your  urologist    14. Vitamin D deficiency  Chronic and Stable on vitamin D . Continue current treatment plan as previously prescribed with your PCP    15.Hyperlipidemia, unspecified hyperlipidemia type   Chronic and Stable on Crestor. Continue current treatment plan as previously prescribed with your PCP    Provided Natalia with a 5-10 year written screening schedule and personal prevention plan. Recommendations were developed using the USPHS age appropriate recommendations. Education, counseling, and referrals were provided as needed.  After Visit Summary printed and given to patient which includes a list of additional screenings\tests needed.    Follow up in about 1 year (around 4/4/2025) for Follow up with PCP.  Arlene Mendez NP

## 2024-04-04 NOTE — PATIENT INSTRUCTIONS
Qasim Fisher,     If you are due for any health screening(s) below please notify me so we can arrange them to be ordered and scheduled. Most healthy patients at your age complete them, but you are free to accept or refuse.     If you can't do it, I'll definitely understand. If you can, I'd certainly appreciate it!    All of your core healthy metrics are met.                     Counseling and Referral of Other Preventative  (Italic type indicates deductible and co-insurance are waived)    Patient Name: Natalia Mckeon  Today's Date: 4/4/2024    Health Maintenance       Date Due Completion Date    RSV Vaccine (Age 60+ and Pregnant patients) (1 - 1-dose 60+ series) Never done ---    COVID-19 Vaccine (5 - 2023-24 season) 09/01/2023 6/28/2022    TETANUS VACCINE 06/25/2024 6/25/2014    Hemoglobin A1c 07/08/2024 1/8/2024    Eye Exam 09/26/2024 9/26/2023    Override on 9/26/2023: Done    Override on 3/1/2022: Done    Override on 11/20/2019: Done    Override on 12/19/2018: Done    Override on 7/20/2015: Done    Override on 7/20/2015: Done    DEXA Scan 10/26/2024 10/26/2022    Diabetes Urine Screening 01/08/2025 1/8/2024    Lipid Panel 01/08/2025 1/8/2024        No orders of the defined types were placed in this encounter.    The following information is provided to all patients.  This information is to help you find resources for any of the problems found today that may be affecting your health:                  Living healthy guide: www.[facility].[place].gov      Understanding Diabetes: www.diabetes.org      Eating healthy: www.cdc.gov/healthyweight      CDC home safety checklist: www.cdc.gov/steadi/patient.html      Agency on Aging: www.goea.[place].gov      Alcoholics anonymous (AA): www.aa.org      Physical Activity: www.elicia.nih.gov/iw9nhnk      Tobacco use: www.quitwithusla.org

## 2024-04-11 ENCOUNTER — OFFICE VISIT (OUTPATIENT)
Dept: PRIMARY CARE CLINIC | Facility: CLINIC | Age: 79
End: 2024-04-11
Payer: MEDICARE

## 2024-04-11 VITALS
DIASTOLIC BLOOD PRESSURE: 65 MMHG | HEIGHT: 62 IN | TEMPERATURE: 98 F | OXYGEN SATURATION: 96 % | WEIGHT: 141.31 LBS | BODY MASS INDEX: 26.01 KG/M2 | HEART RATE: 72 BPM | SYSTOLIC BLOOD PRESSURE: 132 MMHG

## 2024-04-11 DIAGNOSIS — I15.2 HYPERTENSION ASSOCIATED WITH DIABETES: Primary | ICD-10-CM

## 2024-04-11 DIAGNOSIS — E11.69 HYPERLIPIDEMIA ASSOCIATED WITH TYPE 2 DIABETES MELLITUS: ICD-10-CM

## 2024-04-11 DIAGNOSIS — E78.5 HYPERLIPIDEMIA ASSOCIATED WITH TYPE 2 DIABETES MELLITUS: ICD-10-CM

## 2024-04-11 DIAGNOSIS — E11.59 HYPERTENSION ASSOCIATED WITH DIABETES: Primary | ICD-10-CM

## 2024-04-11 DIAGNOSIS — J45.20 MILD INTERMITTENT ASTHMA WITHOUT COMPLICATION: ICD-10-CM

## 2024-04-11 DIAGNOSIS — N18.32 STAGE 3B CHRONIC KIDNEY DISEASE: ICD-10-CM

## 2024-04-11 DIAGNOSIS — F41.9 ANXIETY: ICD-10-CM

## 2024-04-11 DIAGNOSIS — E11.3553 TYPE 2 DIABETES MELLITUS WITH STABLE PROLIFERATIVE RETINOPATHY OF BOTH EYES, WITHOUT LONG-TERM CURRENT USE OF INSULIN: ICD-10-CM

## 2024-04-11 DIAGNOSIS — E11.42 CONTROLLED TYPE 2 DIABETES MELLITUS WITH DIABETIC POLYNEUROPATHY, WITHOUT LONG-TERM CURRENT USE OF INSULIN: ICD-10-CM

## 2024-04-11 PROCEDURE — 1157F ADVNC CARE PLAN IN RCRD: CPT | Mod: CPTII,S$GLB,, | Performed by: INTERNAL MEDICINE

## 2024-04-11 PROCEDURE — 3072F LOW RISK FOR RETINOPATHY: CPT | Mod: CPTII,S$GLB,, | Performed by: INTERNAL MEDICINE

## 2024-04-11 PROCEDURE — 99213 OFFICE O/P EST LOW 20 MIN: CPT | Mod: S$GLB,,, | Performed by: INTERNAL MEDICINE

## 2024-04-11 PROCEDURE — 1126F AMNT PAIN NOTED NONE PRSNT: CPT | Mod: CPTII,S$GLB,, | Performed by: INTERNAL MEDICINE

## 2024-04-11 PROCEDURE — 1101F PT FALLS ASSESS-DOCD LE1/YR: CPT | Mod: CPTII,S$GLB,, | Performed by: INTERNAL MEDICINE

## 2024-04-11 PROCEDURE — 1159F MED LIST DOCD IN RCRD: CPT | Mod: CPTII,S$GLB,, | Performed by: INTERNAL MEDICINE

## 2024-04-11 PROCEDURE — 99999 PR PBB SHADOW E&M-EST. PATIENT-LVL V: CPT | Mod: PBBFAC,,, | Performed by: INTERNAL MEDICINE

## 2024-04-11 PROCEDURE — 3078F DIAST BP <80 MM HG: CPT | Mod: CPTII,S$GLB,, | Performed by: INTERNAL MEDICINE

## 2024-04-11 PROCEDURE — 3075F SYST BP GE 130 - 139MM HG: CPT | Mod: CPTII,S$GLB,, | Performed by: INTERNAL MEDICINE

## 2024-04-11 PROCEDURE — 3288F FALL RISK ASSESSMENT DOCD: CPT | Mod: CPTII,S$GLB,, | Performed by: INTERNAL MEDICINE

## 2024-04-11 RX ORDER — ROSUVASTATIN CALCIUM 20 MG/1
20 TABLET, COATED ORAL DAILY
Qty: 90 TABLET | Refills: 3 | Status: SHIPPED | OUTPATIENT
Start: 2024-04-11

## 2024-06-10 ENCOUNTER — LAB VISIT (OUTPATIENT)
Dept: LAB | Facility: HOSPITAL | Age: 79
End: 2024-06-10
Payer: MEDICARE

## 2024-06-10 DIAGNOSIS — E11.69 HYPERLIPIDEMIA ASSOCIATED WITH TYPE 2 DIABETES MELLITUS: ICD-10-CM

## 2024-06-10 DIAGNOSIS — E78.5 HYPERLIPIDEMIA ASSOCIATED WITH TYPE 2 DIABETES MELLITUS: ICD-10-CM

## 2024-06-10 LAB
ALBUMIN/CREAT UR: 22 UG/MG (ref 0–30)
CREAT UR-MCNC: 50 MG/DL (ref 15–325)
MICROALBUMIN UR DL<=1MG/L-MCNC: 11 UG/ML

## 2024-06-10 PROCEDURE — 82570 ASSAY OF URINE CREATININE: CPT | Performed by: INTERNAL MEDICINE

## 2024-06-11 ENCOUNTER — TELEPHONE (OUTPATIENT)
Dept: PRIMARY CARE CLINIC | Facility: CLINIC | Age: 79
End: 2024-06-11
Payer: MEDICARE

## 2024-06-11 NOTE — TELEPHONE ENCOUNTER
Please tell pt her kidney function is worse than normal- has she been drinking enough fluids daily?  Any vomiting or diarrhea?  SM

## 2024-06-13 ENCOUNTER — OFFICE VISIT (OUTPATIENT)
Dept: PRIMARY CARE CLINIC | Facility: CLINIC | Age: 79
End: 2024-06-13
Payer: MEDICARE

## 2024-06-13 VITALS
OXYGEN SATURATION: 97 % | BODY MASS INDEX: 25.7 KG/M2 | DIASTOLIC BLOOD PRESSURE: 62 MMHG | TEMPERATURE: 98 F | HEART RATE: 85 BPM | HEIGHT: 62 IN | SYSTOLIC BLOOD PRESSURE: 122 MMHG | WEIGHT: 139.69 LBS

## 2024-06-13 DIAGNOSIS — N18.32 STAGE 3B CHRONIC KIDNEY DISEASE: ICD-10-CM

## 2024-06-13 DIAGNOSIS — E11.3553 TYPE 2 DIABETES MELLITUS WITH STABLE PROLIFERATIVE RETINOPATHY OF BOTH EYES, WITHOUT LONG-TERM CURRENT USE OF INSULIN: Primary | ICD-10-CM

## 2024-06-13 DIAGNOSIS — E11.42 CONTROLLED TYPE 2 DIABETES MELLITUS WITH DIABETIC POLYNEUROPATHY, WITHOUT LONG-TERM CURRENT USE OF INSULIN: ICD-10-CM

## 2024-06-13 PROCEDURE — 3072F LOW RISK FOR RETINOPATHY: CPT | Mod: CPTII,S$GLB,, | Performed by: NURSE PRACTITIONER

## 2024-06-13 PROCEDURE — 3288F FALL RISK ASSESSMENT DOCD: CPT | Mod: CPTII,S$GLB,, | Performed by: NURSE PRACTITIONER

## 2024-06-13 PROCEDURE — 99214 OFFICE O/P EST MOD 30 MIN: CPT | Mod: S$GLB,,, | Performed by: NURSE PRACTITIONER

## 2024-06-13 PROCEDURE — 1101F PT FALLS ASSESS-DOCD LE1/YR: CPT | Mod: CPTII,S$GLB,, | Performed by: NURSE PRACTITIONER

## 2024-06-13 PROCEDURE — 1157F ADVNC CARE PLAN IN RCRD: CPT | Mod: CPTII,S$GLB,, | Performed by: NURSE PRACTITIONER

## 2024-06-13 PROCEDURE — 3074F SYST BP LT 130 MM HG: CPT | Mod: CPTII,S$GLB,, | Performed by: NURSE PRACTITIONER

## 2024-06-13 PROCEDURE — 3078F DIAST BP <80 MM HG: CPT | Mod: CPTII,S$GLB,, | Performed by: NURSE PRACTITIONER

## 2024-06-13 PROCEDURE — 1126F AMNT PAIN NOTED NONE PRSNT: CPT | Mod: CPTII,S$GLB,, | Performed by: NURSE PRACTITIONER

## 2024-06-13 PROCEDURE — 1159F MED LIST DOCD IN RCRD: CPT | Mod: CPTII,S$GLB,, | Performed by: NURSE PRACTITIONER

## 2024-06-13 PROCEDURE — 99999 PR PBB SHADOW E&M-EST. PATIENT-LVL V: CPT | Mod: PBBFAC,,, | Performed by: NURSE PRACTITIONER

## 2024-06-13 RX ORDER — LANCETS
EACH MISCELLANEOUS
Qty: 200 EACH | Refills: 3 | Status: SHIPPED | OUTPATIENT
Start: 2024-06-13 | End: 2024-06-13 | Stop reason: SDUPTHER

## 2024-06-13 RX ORDER — LANCETS
EACH MISCELLANEOUS
Qty: 200 EACH | Refills: 3 | Status: SHIPPED | OUTPATIENT
Start: 2024-06-13

## 2024-06-13 RX ORDER — INSULIN PUMP SYRINGE, 3 ML
EACH MISCELLANEOUS
Qty: 1 EACH | Refills: 0 | Status: SHIPPED | OUTPATIENT
Start: 2024-06-13 | End: 2024-06-13 | Stop reason: SDUPTHER

## 2024-06-13 RX ORDER — INSULIN PUMP SYRINGE, 3 ML
EACH MISCELLANEOUS
Qty: 1 EACH | Refills: 0 | Status: SHIPPED | OUTPATIENT
Start: 2024-06-13 | End: 2025-06-13

## 2024-06-13 RX ORDER — INSULIN PUMP SYRINGE, 3 ML
EACH MISCELLANEOUS
Start: 2024-06-13 | End: 2024-06-13 | Stop reason: SDUPTHER

## 2024-06-13 NOTE — PROGRESS NOTES
Natalia Mckeon  06/13/2024  [id number]    Jen Carlton MD  Patient Care Team:  Jen Carlton MD as PCP - General (Internal Medicine)  ROSE Chairez MD as Consulting Physician (Ophthalmology)  Alyssia Mckeon LPN as Care Coordinator  Ricky Resendez MD as Consulting Physician (Gastroenterology)  Mesha Obrien MD (Obstetrics and Gynecology)  Jaspreet Phillip MD as Consulting Physician (Ophthalmology)  Jl Su MD as Consulting Physician (Otolaryngology)  Naveen, Samuel Monroy MD as Consulting Physician (Family Medicine)  Services, [facility] Note      Chief Complaint:  Chief Complaint   Patient presents with    Follow-up    Vaginitis     From medication prescribe by Dr Lira    Medication Problem     Jardiance gives pt dry mouth and very thirsty          Assessment/Plan:  1. Type 2 diabetes mellitus with stable proliferative retinopathy of both eyes, without long-term current use of insulin  -     SITagliptin phosphate (JANUVIA) 50 MG Tab; Take 1 tablet (50 mg total) by mouth once daily.  Dispense: 90 tablet; Refill: 3  -     BASIC METABOLIC PANEL; Future; Expected date: 08/13/2024    2. Stage 3b chronic kidney disease  Assessment & Plan:  Decline in GFR 8 weeks after starting Jardiance 10 mg daily     Latest Reference Range & Units 01/08/24 11:44 06/10/24 10:47   BUN 8 - 23 mg/dL 19 31 (H)   Creatinine 0.5 - 1.4 mg/dL 1.3 1.8 (H)   eGFR >60 mL/min/1.73 m^2 42.1 ! 28.3 !   Tighter control on blood sugar  Continue Jardiance  Drink extra clear fluids  BMP in 6 weeks      Orders:  -     BASIC METABOLIC PANEL; Future; Expected date: 08/13/2024    3. Controlled type 2 diabetes mellitus with diabetic polyneuropathy, without long-term current use of insulin  Assessment & Plan:  Metformin 1000 mg bid stopped in 4/24    Hgb A1 C has elevated 7.5 >>>> 8.8  Monitor and record blood glucose  Continue Jardiance 10 mg daily  Januvia 50 mg  daily added    Orders:  -     Discontinue: blood sugar diagnostic (CONTOUR NEXT TEST STRIPS) Strp; CHECK BLOOD SUGAR TWICE DAILY  Dispense: 200 strip; Refill: 3  -     Discontinue: blood-glucose meter kit; To check BG 2 times daily, to use with insurance preferred meter  Dx:  E11.9  Dispense: 1 each; Refill: 0  -     Discontinue: lancets Misc; To check BG 2 times daily, to use with insurance preferred meter  Dx:  E11.9  Dispense: 200 each; Refill: 3  -     Discontinue: blood sugar diagnostic (CONTOUR NEXT TEST STRIPS) Strp; CHECK BLOOD SUGAR TWICE DAILY  -     Discontinue: blood-glucose meter kit; To check BG 2 times daily, to use with insurance preferred meter  Dx:  E11.9  -     Discontinue: lancets Misc; To check BG 2 times daily, to use with insurance preferred meter  Dx:  E11.9  Dispense: 200 each; Refill: 3  -     blood sugar diagnostic (CONTOUR NEXT TEST STRIPS) Strp; CHECK BLOOD SUGAR TWICE DAILY  Dispense: 200 each; Refill: 2  -     blood-glucose meter kit; To check BG 2 times daily, to use with insurance preferred meter  Dx:  E11.9  Dispense: 1 each; Refill: 0  -     lancets Misc; To check BG 2 times daily, to use with insurance preferred meter  Dx:  E11.9  Dispense: 200 each; Refill: 3          Worry Score: 2  History of Present Illness:    Last saw Dr. Carlton on 4/11/24 - started Jardiance 10 mg daily and metformin was stopped.   Had labs on 6/10/24.   Message to patient - 6/11 = Please tell pt her kidney function is worse than normal- has she been drinking enough fluids daily?  Any vomiting or diarrhea?    Ms. Mckeon is here for F/U. Reports increased thirst since Jardiance  Drinking extra fluids    CBG in               evening 150s, 200 after eating   Denies any changes in diet.  Increased urine - nocturia X2  Denies other urinary symptoms including dysuria, hematuria and urgency.     Denies current symptoms of yeast infection - prone after ABX.     Reduction in GFR since onset of Jardiance -  likely rebound effect  Drinking fluids, denies recent GI losses or difficulty urinating.     No f/c/sw, denies CP, SOB, palp,    Denies abdominal pain, N/v/D            The following were reviewed: Active problem list, medication list, allergies, family history, social history, and Health Maintenance.     History:  Past Medical History:   Diagnosis Date    Anxiety     Asthma     Colon polyp     Diabetes mellitus, type 2     Diabetic retinopathy     DM (diabetes mellitus) 2008     11/07/2018    Hyperlipidemia     Hypertension     Hypothyroidism     Primary open angle glaucoma of both eyes, mild stage 1/2/2018    Pyelonephritis     Recurrent UTI     Dr. Lira follows    Stage 3a chronic kidney disease 3/9/2022     Past Surgical History:   Procedure Laterality Date    HYSTERECTOMY       Family History   Problem Relation Name Age of Onset    Cancer Mother          pancreatic    Hypertension Mother      Cancer Father          prostate    Stroke Brother      Cataracts Brother      Glaucoma Brother      Lupus Sister       Patient Active Problem List   Diagnosis    Hypertension associated with diabetes    Mild intermittent asthma without complication    Vitamin D deficiency    Anxiety    Recurrent UTI    Controlled type 2 diabetes mellitus with diabetic polyneuropathy, without long-term current use of insulin    Family history of glaucoma    Primary open angle glaucoma of both eyes, mild stage    Hyperlipidemia associated with type 2 diabetes mellitus    Acquired hypothyroidism    History of colon polyps    GERD (gastroesophageal reflux disease)    Bilateral hearing loss    Stage 3b chronic kidney disease    Type 2 diabetes mellitus with stable proliferative retinopathy of both eyes, without long-term current use of insulin     Review of patient's allergies indicates:   Allergen Reactions    Sulfa (sulfonamide antibiotics) Anaphylaxis    Aspirin Other (See Comments)     Stomach pain    Methocarbamol         Medications:  Current Outpatient Medications on File Prior to Visit   Medication Sig Dispense Refill    aspirin 81 MG Chew Take 1 tablet (81 mg total) by mouth every other day.  0    azelastine (ASTELIN) 137 mcg (0.1 %) nasal spray 2 sprays 2 (two) times daily.      benzonatate (TESSALON) 100 MG capsule Take by mouth.      blood sugar diagnostic Strp Check sugars daily prn 100 strip 3    brimonidine 0.2% (ALPHAGAN) 0.2 % Drop Place 1 drop into both eyes 2 (two) times a day. 15 mL 3    CALCIUM CARBONATE (CALCIUM 300 ORAL) Take by mouth.      cranberry conc-C-bacillus coag 250-30-50 mg-mg-million Tab Take by mouth.      cyanocobalamin, vitamin B-12, 5,000 mcg Subl Place under the tongue.      empagliflozin (JARDIANCE) 10 mg tablet Take 1 tablet (10 mg total) by mouth once daily. 30 tablet 11    fluticasone (FLONASE) 50 mcg/actuation nasal spray 2 sprays by Each Nare route once daily. 16 g 6    fluticasone-salmeterol diskus inhaler 250-50 mcg Inhale 1 puff into the lungs 2 (two) times daily. 60 each 11    ipratropium (ATROVENT) 0.03 % nasal spray 2 sprays by Nasal route 2 (two) times daily. 30 mL 4    ketorolac 0.5% (ACULAR) 0.5 % Drop ketorolac 0.5 % eye drops      latanoprost 0.005 % ophthalmic solution INSTILL 1 DROP IN BOTH EYES EVERY EVENING 2.5 mL 2    latanoprost 0.005 % ophthalmic solution Place 1 drop into both eyes once daily. 2.5 mL 3    levocetirizine (XYZAL) 5 MG tablet levocetirizine 5 mg tablet      levothyroxine (SYNTHROID) 25 MCG tablet Take 1 tablet (25 mcg total) by mouth once daily. 90 tablet 1    olmesartan-amLODIPin-hcthiazid 40-10-12.5 mg Tab TAKE 1 TABLET BY MOUTH EVERY DAY 90 tablet 3    omeprazole (PRILOSEC) 40 MG capsule Take 1 capsule (40 mg total) by mouth once daily. For acid reflux 90 capsule 3    PHENAZOPYRIDINE HCL (AZO ORAL) Take by mouth.      polyethylene glycol (GLYCOLAX) 17 gram/dose powder MIX 17 GRAMS ACCORDING TO PACKAGE DIRECTIONS AND DRINK ONCE DAILY 510 g 3    potassium  chloride SA (K-DUR,KLOR-CON) 20 MEQ tablet TAKE 1 TABLET(20 MEQ) BY MOUTH EVERY DAY 90 tablet 3    rosuvastatin (CRESTOR) 20 MG tablet Take 1 tablet (20 mg total) by mouth once daily. 90 tablet 3    sertraline (ZOLOFT) 50 MG tablet TAKE 1 AND 1/2 TABLETS(75 MG) BY MOUTH EVERY EVENING 135 tablet 3    traZODone (DESYREL) 100 MG tablet Take 1 tablet (100 mg total) by mouth every evening. 30 tablet 11    [DISCONTINUED] blood sugar diagnostic (CONTOUR NEXT TEST STRIPS) Strp CHECK BLOOD SUGAR TWICE DAILY 200 strip 3    [DISCONTINUED] blood-glucose meter kit To check BG 2 times daily, to use with insurance preferred meter  Dx:  E11.9 1 each 0    [DISCONTINUED] lancets Misc To check BG 2 times daily, to use with insurance preferred meter  Dx:  E11.9 200 each 3     No current facility-administered medications on file prior to visit.       Medications have been reviewed and reconciled with patient at visit today.      Exam:  Vitals:    06/13/24 1504   BP: 122/62   Pulse: 85   Temp: 97.8 °F (36.6 °C)     Weight: 63.3 kg (139 lb 10.6 oz)   Body mass index is 25.54 kg/m².      BP Readings from Last 3 Encounters:   06/13/24 122/62   04/11/24 132/65   04/04/24 136/70     Wt Readings from Last 3 Encounters:   06/13/24 1504 63.3 kg (139 lb 10.6 oz)   04/11/24 1117 64.1 kg (141 lb 5 oz)   04/04/24 0942 63.4 kg (139 lb 12.4 oz)        REVIEW OF SYSTEMS  Review of Systems     Physical Exam  Vitals reviewed.   Constitutional:       General: She is not in acute distress.     Appearance: Normal appearance.   HENT:      Head: Normocephalic and atraumatic.      Mouth/Throat:      Mouth: Mucous membranes are moist.   Eyes:      Conjunctiva/sclera: Conjunctivae normal.   Cardiovascular:      Rate and Rhythm: Normal rate and regular rhythm.      Heart sounds: No murmur heard.  Pulmonary:      Effort: Pulmonary effort is normal. No respiratory distress.      Breath sounds: Normal breath sounds.   Abdominal:      Palpations: Abdomen is soft.  There is no mass.      Tenderness: There is no abdominal tenderness.   Musculoskeletal:         General: Normal range of motion.      Cervical back: Normal range of motion and neck supple.      Right lower leg: No edema.      Left lower leg: No edema.   Lymphadenopathy:      Cervical: No cervical adenopathy.   Skin:     General: Skin is warm and dry.   Neurological:      Mental Status: She is alert and oriented to person, place, and time. Mental status is at baseline.   Psychiatric:         Mood and Affect: Mood normal.         Thought Content: Thought content normal.          Laboratory Reviewed:     Lab Results   Component Value Date    WBC 5.30 06/10/2024    HGB 11.5 (L) 06/10/2024    HCT 35.4 (L) 06/10/2024     06/10/2024    CHOL 169 06/10/2024    TRIG 86 06/10/2024    HDL 64 06/10/2024    ALT 20 06/10/2024    AST 19 06/10/2024     06/10/2024    K 5.1 06/10/2024     06/10/2024    CREATININE 1.8 (H) 06/10/2024    BUN 31 (H) 06/10/2024    CO2 21 (L) 06/10/2024    TSH 2.152 06/10/2024    INR 1.0 09/21/2014    HGBA1C 8.8 (H) 06/10/2024       Screening or Prevention Patient's value Goal Complete/Controlled?   HgA1C Testing and Control   Lab Results   Component Value Date    HGBA1C 8.8 (H) 06/10/2024      Annually/Less than 8% No   Lipid profile : 06/10/2024 Annually Yes   LDL control Lab Results   Component Value Date    LDLCALC 87.8 06/10/2024    Annually/Less than 100 mg/dl  Yes   Nephropathy screening Lab Results   Component Value Date    LABMICR 11.0 06/10/2024     Lab Results   Component Value Date    PROTEINUA Negative 03/24/2017    Annually Yes   Blood pressure BP Readings from Last 1 Encounters:   06/13/24 122/62    Less than 140/90 Yes   Dilated retinal exam : 09/26/2023 Annually Yes   Foot exam   Most Recent Foot Exam Date: Not Found Annually Yes       Health Maintenance  Health Maintenance Topics with due status: Not Due       Topic Last Completion Date    DEXA Scan 10/26/2022    Eye  Exam 09/26/2023    TETANUS VACCINE 04/11/2024    Diabetes Urine Screening 06/10/2024    Lipid Panel 06/10/2024    Hemoglobin A1c 06/10/2024     Health Maintenance Due   Topic Date Due    RSV Vaccine (Age 60+ and Pregnant patients) (1 - 1-dose 60+ series) Never done    COVID-19 Vaccine (5 - 2023-24 season) 09/01/2023               -Patient's lab results were reviewed and discussed with patient  -Treatment options and alternatives were discussed with the patient. Patient expressed understanding. Patient was given the opportunity to ask questions and be an active participant in their medical care. Patient had no further questions or concerns at this time.         Future Appointments   Date Time Provider Department Center   7/17/2024  3:00 PM Jen Carlton MD [facility]   8/13/2024  8:35 AM LABORATORY, [facility]          After visit summary printed and given to patient upon discharge.  Patient goals and care plan are included in After visit summary.      The following issues were discussed: The primary encounter diagnosis was Type 2 diabetes mellitus with stable proliferative retinopathy of both eyes, without long-term current use of insulin. Diagnoses of Stage 3b chronic kidney disease and Controlled type 2 diabetes mellitus with diabetic polyneuropathy, without long-term current use of insulin were also pertinent to this visit.    Health maintenance needs, recent test results and goals of care discussed with pt and questions answered.           MALLORIE Wise, NP-C  [facility] [address] [place] [address]

## 2024-06-13 NOTE — ASSESSMENT & PLAN NOTE
Metformin 1000 mg bid stopped in 4/24    Hgb A1 C has elevated 7.5 >>>> 8.8  Monitor and record blood glucose  Continue Jardiance 10 mg daily  Januvia 50 mg daily added

## 2024-06-13 NOTE — ASSESSMENT & PLAN NOTE
Decline in GFR 8 weeks after starting Jardiance 10 mg daily     Latest Reference Range & Units 01/08/24 11:44 06/10/24 10:47   BUN 8 - 23 mg/dL 19 31 (H)   Creatinine 0.5 - 1.4 mg/dL 1.3 1.8 (H)   eGFR >60 mL/min/1.73 m^2 42.1 ! 28.3 !   Tighter control on blood sugar  Continue Jardiance  Drink extra clear fluids  BMP in 6 weeks

## 2024-07-10 NOTE — PROGRESS NOTES
"Subjective:      Patient ID: Natalia Mckeon is a 79 y.o. female.    Chief Complaint: Hypertension (3 month f/u )      HPI  Here for f/u medical problems and preventive exam.  No f/c/sw/cough.  No cp/sob/palp.  BMs good with miralax/fiber.   Urine normal.  Anxiety doing well with zoloft.  AC breakfast 150 range, once was 300.  ON jardiance since 4/24.  Occas albuterol, about 5x per month.    HM: 10/23 fluvax, 1/21 covid vaccines/booster, 4/24 HAV, 7/15 [id number], 2/18 booster [id number], 7/20 [id number], 4/24 Tdap, 10/14 zostervax at pharmacy, 2019 Shingrix x2, 11/23 [facility]/Gyn Dr. Obrien, bmd per her, 3/24 Cscope Dr. Auguste rep 5y, 9/23 Eye Dr. Phillip, 11/16 HCV neg, Cards Dr. Jm Vasquez, Urol Dr. Lira.     Review of Systems   Constitutional:  Negative for appetite change, chills, diaphoresis and fever.   HENT:  Negative for congestion, ear pain, rhinorrhea, sinus pressure and sore throat.    Respiratory:  Negative for cough, chest tightness and shortness of breath.    Cardiovascular:  Negative for chest pain and palpitations.   Gastrointestinal:  Negative for blood in stool, constipation, diarrhea, nausea and vomiting.   Genitourinary:  Negative for dysuria, frequency, hematuria, menstrual problem, urgency and vaginal discharge.   Musculoskeletal:  Negative for arthralgias.   Skin:  Negative for rash.   Neurological:  Negative for dizziness and headaches.   Psychiatric/Behavioral:  Negative for sleep disturbance. The patient is not nervous/anxious.          Objective:   /64 (BP Location: Right arm, Patient Position: Sitting)   Pulse 79   Temp 97.2 °F (36.2 °C) (Skin)   Ht 5' 2" (1.575 m)   Wt 64.7 kg (142 lb 8.4 oz)   SpO2 96%   BMI 26.07 kg/m²     Physical Exam  Constitutional:       Appearance: She is well-developed.   HENT:      Right Ear: External ear normal. Tympanic membrane is not injected.      Left Ear: External ear normal. Tympanic membrane is not injected.   Eyes:      Conjunctiva/sclera: " Conjunctivae normal.   Neck:      Thyroid: No thyromegaly.   Cardiovascular:      Rate and Rhythm: Normal rate and regular rhythm.      Pulses:           Dorsalis pedis pulses are 2+ on the right side and 2+ on the left side.        Posterior tibial pulses are 2+ on the right side and 2+ on the left side.      Heart sounds: No murmur heard.     No friction rub. No gallop.   Pulmonary:      Effort: Pulmonary effort is normal.      Breath sounds: Normal breath sounds. No wheezing or rales.   Abdominal:      General: Bowel sounds are normal.      Palpations: Abdomen is soft. There is no mass.      Tenderness: There is no abdominal tenderness.   Musculoskeletal:      Cervical back: Normal range of motion and neck supple.   Feet:      Right foot:      Protective Sensation: 10 sites tested.  10 sites sensed.      Skin integrity: No ulcer, blister, skin breakdown, erythema, warmth, callus or dry skin.      Left foot:      Protective Sensation: 10 sites tested.  10 sites sensed.      Skin integrity: No ulcer, blister, skin breakdown, erythema, warmth, callus or dry skin.   Lymphadenopathy:      Cervical: No cervical adenopathy.   Skin:     General: Skin is warm.      Findings: No rash.   Neurological:      Mental Status: She is alert and oriented to person, place, and time.        Latest Reference Range & Units 06/10/24 10:41 06/10/24 10:47   WBC 3.90 - 12.70 K/uL  5.30   RBC 4.00 - 5.40 M/uL  4.28   Hemoglobin 12.0 - 16.0 g/dL  11.5 (L)   Hematocrit 37.0 - 48.5 %  35.4 (L)   MCV 82 - 98 fL  83   MCH 27.0 - 31.0 pg  26.9 (L)   MCHC 32.0 - 36.0 g/dL  32.5   RDW 11.5 - 14.5 %  15.0 (H)   Platelet Count 150 - 450 K/uL  182   MPV 9.2 - 12.9 fL  12.0   Gran % 38.0 - 73.0 %  71.3   Lymph % 18.0 - 48.0 %  18.3   Mono % 4.0 - 15.0 %  8.1   Eos % 0.0 - 8.0 %  1.7   Basophil % 0.0 - 1.9 %  0.4   Immature Granulocytes 0.0 - 0.5 %  0.2   Gran # (ANC) 1.8 - 7.7 K/uL  3.8   Lymph # 1.0 - 4.8 K/uL  1.0   Mono # 0.3 - 1.0 K/uL  0.4   Eos #  0.0 - 0.5 K/uL  0.1   Baso # 0.00 - 0.20 K/uL  0.02   Immature Grans (Abs) 0.00 - 0.04 K/uL  0.01   nRBC 0 /100 WBC  0   Differential Method   Automated   Sodium 136 - 145 mmol/L  138   Potassium 3.5 - 5.1 mmol/L  5.1   Chloride 95 - 110 mmol/L  103   CO2 23 - 29 mmol/L  21 (L)   Anion Gap 8 - 16 mmol/L  14   BUN 8 - 23 mg/dL  31 (H)   Creatinine 0.5 - 1.4 mg/dL  1.8 (H)   eGFR >60 mL/min/1.73 m^2  28.3 !   Glucose 70 - 110 mg/dL  231 (H)   Calcium 8.7 - 10.5 mg/dL  9.4   ALP 55 - 135 U/L  89   PROTEIN TOTAL 6.0 - 8.4 g/dL  7.1   Albumin 3.5 - 5.2 g/dL  3.9   BILIRUBIN TOTAL 0.1 - 1.0 mg/dL  0.5   AST 10 - 40 U/L  19   ALT 10 - 44 U/L  20   Cholesterol Total 120 - 199 mg/dL  169   HDL 40 - 75 mg/dL  64   HDL/Cholesterol Ratio 20.0 - 50.0 %  37.9   Non-HDL Cholesterol mg/dL  105   Total Cholesterol/HDL Ratio 2.0 - 5.0   2.6   Triglycerides 30 - 150 mg/dL  86   LDL Cholesterol 63.0 - 159.0 mg/dL  87.8   Hemoglobin A1C External 4.0 - 5.6 %  8.8 (H)   Estimated Avg Glucose 68 - 131 mg/dL  206 (H)   TSH 0.400 - 4.000 uIU/mL  2.152   Urine Creatinine 15.0 - 325.0 mg/dL 50.0    Urine Microalbumin ug/mL 11.0    MICROALB/CREAT RATIO 0.0 - 30.0 ug/mg 22.0          Assessment:       1. Hypertension associated with diabetes    2. Type 2 diabetes mellitus with stable proliferative retinopathy of both eyes, without long-term current use of insulin    3. Hyperlipidemia associated with type 2 diabetes mellitus    4. Acquired hypothyroidism    5. Anxiety    6. Controlled type 2 diabetes mellitus with diabetic polyneuropathy, without long-term current use of insulin    7. Stage 3b chronic kidney disease    8. Gastroesophageal reflux disease without esophagitis          Plan:     Hypertension associated with diabetes- stable, cont rx.    Type 2 diabetes mellitus with stable proliferative retinopathy of both eyes, wwith diabetic polyneuropathy, need to increase rx, but recheck creat now.    Hyperlipidemia associated with type 2  diabetes mellitus- cont crestor for now.    Acquired hypothyroidism- Clinically stable, continue present treatment.    Anxiety- doing well on ssri, cont.    Stage 3b chronic kidney disease  -     Basic Metabolic Panel; Future; Expected date: 07/17/2024    Gastroesophageal reflux disease without esophagitis- cont PPI.    RTC 1mo.

## 2024-07-17 ENCOUNTER — OFFICE VISIT (OUTPATIENT)
Dept: PRIMARY CARE CLINIC | Facility: CLINIC | Age: 79
End: 2024-07-17
Payer: MEDICARE

## 2024-07-17 VITALS
WEIGHT: 142.5 LBS | TEMPERATURE: 97 F | BODY MASS INDEX: 26.22 KG/M2 | SYSTOLIC BLOOD PRESSURE: 128 MMHG | HEART RATE: 79 BPM | DIASTOLIC BLOOD PRESSURE: 64 MMHG | OXYGEN SATURATION: 96 % | HEIGHT: 62 IN

## 2024-07-17 DIAGNOSIS — E11.42 CONTROLLED TYPE 2 DIABETES MELLITUS WITH DIABETIC POLYNEUROPATHY, WITHOUT LONG-TERM CURRENT USE OF INSULIN: ICD-10-CM

## 2024-07-17 DIAGNOSIS — E03.9 ACQUIRED HYPOTHYROIDISM: ICD-10-CM

## 2024-07-17 DIAGNOSIS — N18.32 STAGE 3B CHRONIC KIDNEY DISEASE: ICD-10-CM

## 2024-07-17 DIAGNOSIS — E11.3553 TYPE 2 DIABETES MELLITUS WITH STABLE PROLIFERATIVE RETINOPATHY OF BOTH EYES, WITHOUT LONG-TERM CURRENT USE OF INSULIN: ICD-10-CM

## 2024-07-17 DIAGNOSIS — E11.59 HYPERTENSION ASSOCIATED WITH DIABETES: Primary | ICD-10-CM

## 2024-07-17 DIAGNOSIS — F41.9 ANXIETY: ICD-10-CM

## 2024-07-17 DIAGNOSIS — E11.69 HYPERLIPIDEMIA ASSOCIATED WITH TYPE 2 DIABETES MELLITUS: ICD-10-CM

## 2024-07-17 DIAGNOSIS — I15.2 HYPERTENSION ASSOCIATED WITH DIABETES: Primary | ICD-10-CM

## 2024-07-17 DIAGNOSIS — E78.5 HYPERLIPIDEMIA ASSOCIATED WITH TYPE 2 DIABETES MELLITUS: ICD-10-CM

## 2024-07-17 DIAGNOSIS — K21.9 GASTROESOPHAGEAL REFLUX DISEASE WITHOUT ESOPHAGITIS: ICD-10-CM

## 2024-07-17 LAB
ANION GAP SERPL CALC-SCNC: 6 MMOL/L (ref 8–16)
BUN SERPL-MCNC: 32 MG/DL (ref 8–23)
CALCIUM SERPL-MCNC: 10.1 MG/DL (ref 8.7–10.5)
CHLORIDE SERPL-SCNC: 101 MMOL/L (ref 95–110)
CO2 SERPL-SCNC: 30 MMOL/L (ref 23–29)
CREAT SERPL-MCNC: 1.7 MG/DL (ref 0.5–1.4)
EST. GFR  (NO RACE VARIABLE): 30.3 ML/MIN/1.73 M^2
GLUCOSE SERPL-MCNC: 119 MG/DL (ref 70–110)
POTASSIUM SERPL-SCNC: 4.6 MMOL/L (ref 3.5–5.1)
SODIUM SERPL-SCNC: 137 MMOL/L (ref 136–145)

## 2024-07-17 PROCEDURE — 3074F SYST BP LT 130 MM HG: CPT | Mod: CPTII,S$GLB,, | Performed by: INTERNAL MEDICINE

## 2024-07-17 PROCEDURE — 99999 PR PBB SHADOW E&M-EST. PATIENT-LVL III: CPT | Mod: PBBFAC,,, | Performed by: INTERNAL MEDICINE

## 2024-07-17 PROCEDURE — 1157F ADVNC CARE PLAN IN RCRD: CPT | Mod: CPTII,S$GLB,, | Performed by: INTERNAL MEDICINE

## 2024-07-17 PROCEDURE — 80048 BASIC METABOLIC PNL TOTAL CA: CPT | Performed by: INTERNAL MEDICINE

## 2024-07-17 PROCEDURE — 1101F PT FALLS ASSESS-DOCD LE1/YR: CPT | Mod: CPTII,S$GLB,, | Performed by: INTERNAL MEDICINE

## 2024-07-17 PROCEDURE — 1126F AMNT PAIN NOTED NONE PRSNT: CPT | Mod: CPTII,S$GLB,, | Performed by: INTERNAL MEDICINE

## 2024-07-17 PROCEDURE — 1159F MED LIST DOCD IN RCRD: CPT | Mod: CPTII,S$GLB,, | Performed by: INTERNAL MEDICINE

## 2024-07-17 PROCEDURE — 3078F DIAST BP <80 MM HG: CPT | Mod: CPTII,S$GLB,, | Performed by: INTERNAL MEDICINE

## 2024-07-17 PROCEDURE — 3288F FALL RISK ASSESSMENT DOCD: CPT | Mod: CPTII,S$GLB,, | Performed by: INTERNAL MEDICINE

## 2024-07-17 PROCEDURE — 99214 OFFICE O/P EST MOD 30 MIN: CPT | Mod: S$GLB,,, | Performed by: INTERNAL MEDICINE

## 2024-07-17 PROCEDURE — 3072F LOW RISK FOR RETINOPATHY: CPT | Mod: CPTII,S$GLB,, | Performed by: INTERNAL MEDICINE

## 2024-07-25 ENCOUNTER — TELEPHONE (OUTPATIENT)
Dept: PRIMARY CARE CLINIC | Facility: CLINIC | Age: 79
End: 2024-07-25
Payer: MEDICARE

## 2024-07-25 NOTE — TELEPHONE ENCOUNTER
Called patient and notified of the results and recommendations. Patient verbalized understanding, Adriana Salazar

## 2024-08-13 ENCOUNTER — LAB VISIT (OUTPATIENT)
Dept: LAB | Facility: HOSPITAL | Age: 79
End: 2024-08-13
Attending: NURSE PRACTITIONER
Payer: MEDICARE

## 2024-08-13 DIAGNOSIS — N18.32 STAGE 3B CHRONIC KIDNEY DISEASE: ICD-10-CM

## 2024-08-13 DIAGNOSIS — E11.3553 TYPE 2 DIABETES MELLITUS WITH STABLE PROLIFERATIVE RETINOPATHY OF BOTH EYES, WITHOUT LONG-TERM CURRENT USE OF INSULIN: ICD-10-CM

## 2024-08-13 LAB
ANION GAP SERPL CALC-SCNC: 8 MMOL/L (ref 8–16)
BUN SERPL-MCNC: 24 MG/DL (ref 8–23)
CALCIUM SERPL-MCNC: 9 MG/DL (ref 8.7–10.5)
CHLORIDE SERPL-SCNC: 103 MMOL/L (ref 95–110)
CO2 SERPL-SCNC: 24 MMOL/L (ref 23–29)
CREAT SERPL-MCNC: 1.8 MG/DL (ref 0.5–1.4)
EST. GFR  (NO RACE VARIABLE): 28.3 ML/MIN/1.73 M^2
GLUCOSE SERPL-MCNC: 171 MG/DL (ref 70–110)
POTASSIUM SERPL-SCNC: 4.7 MMOL/L (ref 3.5–5.1)
SODIUM SERPL-SCNC: 135 MMOL/L (ref 136–145)

## 2024-08-13 PROCEDURE — 36415 COLL VENOUS BLD VENIPUNCTURE: CPT | Performed by: NURSE PRACTITIONER

## 2024-08-13 PROCEDURE — 80048 BASIC METABOLIC PNL TOTAL CA: CPT | Performed by: NURSE PRACTITIONER

## 2024-08-15 ENCOUNTER — OFFICE VISIT (OUTPATIENT)
Dept: PRIMARY CARE CLINIC | Facility: CLINIC | Age: 79
End: 2024-08-15
Payer: MEDICARE

## 2024-08-15 VITALS
DIASTOLIC BLOOD PRESSURE: 62 MMHG | TEMPERATURE: 97 F | WEIGHT: 145.31 LBS | OXYGEN SATURATION: 97 % | SYSTOLIC BLOOD PRESSURE: 128 MMHG | HEART RATE: 82 BPM | HEIGHT: 62 IN | BODY MASS INDEX: 26.74 KG/M2

## 2024-08-15 DIAGNOSIS — N17.9 ACUTE KIDNEY INJURY SUPERIMPOSED ON CHRONIC KIDNEY DISEASE: ICD-10-CM

## 2024-08-15 DIAGNOSIS — N18.32 STAGE 3B CHRONIC KIDNEY DISEASE: ICD-10-CM

## 2024-08-15 DIAGNOSIS — E11.42 CONTROLLED TYPE 2 DIABETES MELLITUS WITH DIABETIC POLYNEUROPATHY, WITHOUT LONG-TERM CURRENT USE OF INSULIN: ICD-10-CM

## 2024-08-15 DIAGNOSIS — I15.2 HYPERTENSION ASSOCIATED WITH DIABETES: ICD-10-CM

## 2024-08-15 DIAGNOSIS — E11.59 HYPERTENSION ASSOCIATED WITH DIABETES: Primary | ICD-10-CM

## 2024-08-15 DIAGNOSIS — I15.2 HYPERTENSION ASSOCIATED WITH DIABETES: Primary | ICD-10-CM

## 2024-08-15 DIAGNOSIS — N18.9 ACUTE KIDNEY INJURY SUPERIMPOSED ON CHRONIC KIDNEY DISEASE: ICD-10-CM

## 2024-08-15 DIAGNOSIS — N17.9 AKI (ACUTE KIDNEY INJURY): ICD-10-CM

## 2024-08-15 DIAGNOSIS — E11.59 HYPERTENSION ASSOCIATED WITH DIABETES: ICD-10-CM

## 2024-08-15 PROCEDURE — 99999 PR PBB SHADOW E&M-EST. PATIENT-LVL III: CPT | Mod: PBBFAC,,, | Performed by: NURSE PRACTITIONER

## 2024-08-15 RX ORDER — AMLODIPINE BESYLATE 10 MG/1
10 TABLET ORAL
Qty: 90 TABLET | OUTPATIENT
Start: 2024-08-15

## 2024-08-15 RX ORDER — OLMESARTAN MEDOXOMIL 40 MG/1
40 TABLET ORAL
Qty: 90 TABLET | OUTPATIENT
Start: 2024-08-15

## 2024-08-15 RX ORDER — AMLODIPINE BESYLATE 10 MG/1
10 TABLET ORAL DAILY
Qty: 30 TABLET | Refills: 0 | Status: SHIPPED | OUTPATIENT
Start: 2024-08-15 | End: 2025-08-15

## 2024-08-15 RX ORDER — OLMESARTAN MEDOXOMIL 40 MG/1
40 TABLET ORAL DAILY
Qty: 30 TABLET | Refills: 0 | Status: SHIPPED | OUTPATIENT
Start: 2024-08-15 | End: 2025-08-15

## 2024-08-15 NOTE — ASSESSMENT & PLAN NOTE
Lab Results   Component Value Date    HGBA1C 8.8 (H) 06/10/2024    Decrease sugar, eat more vegetable  Continue Januvia and Jardiance  Monitor blood glucose

## 2024-08-15 NOTE — PROGRESS NOTES
Natalia Mckeon  08/15/2024  [id number]    Jen Carlton MD  Patient Care Team:  Jen Carlton MD as PCP - General (Internal Medicine)  ROSE Chairez MD as Consulting Physician (Ophthalmology)  Alyssia Mckeon LPN as Care Coordinator  Ricky Resendez MD as Consulting Physician (Gastroenterology)  Mesha Obrien MD (Obstetrics and Gynecology)  Jaspreet Phillip MD as Consulting Physician (Ophthalmology)  Jl Su MD as Consulting Physician (Otolaryngology)  Naveen, Samuel Monroy MD as Consulting Physician (Family Medicine)  Services, [facility] Note      Chief Complaint:  Chief Complaint   Patient presents with    Follow-up    Results         Assessment/Plan:  1. Hypertension associated with diabetes  Assessment & Plan:  Reports normotensive BP  Continue olmesartan 40 mg and amlodipine 10 mg  HCTZ 12.5 mg stopped due to YANNICK  BMP in 1 month and F/U    Orders:  -     olmesartan (BENICAR) 40 MG tablet; Take 1 tablet (40 mg total) by mouth once daily.  Dispense: 30 tablet; Refill: 0  -     amLODIPine (NORVASC) 10 MG tablet; Take 1 tablet (10 mg total) by mouth once daily.  Dispense: 30 tablet; Refill: 0    2. Stage 3b chronic kidney disease  -     olmesartan (BENICAR) 40 MG tablet; Take 1 tablet (40 mg total) by mouth once daily.  Dispense: 30 tablet; Refill: 0  -     BASIC METABOLIC PANEL; Future; Expected date: 09/15/2024    3. YANNICK (acute kidney injury)  -     BASIC METABOLIC PANEL; Future; Expected date: 09/15/2024    4. Controlled type 2 diabetes mellitus with diabetic polyneuropathy, without long-term current use of insulin  Assessment & Plan:  Lab Results   Component Value Date    HGBA1C 8.8 (H) 06/10/2024    Decrease sugar, eat more vegetable  Continue Januvia and Jardiance  Monitor blood glucose      5. Acute kidney injury superimposed on chronic kidney disease  Assessment & Plan:   Latest Reference Range & Units 01/08/24  "11:44 06/10/24 10:47 07/17/24 14:58 08/13/24 09:14   Creatinine 0.5 - 1.4 mg/dL 1.3 1.8 (H) 1.7 (H) 1.8 (H)   eGFR >60 mL/min/1.73 m^2 42.1 ! 28.3 ! 30.3 ! 28.3 !   Control blood pressure and tighter control on blood glucose  Decrease volume of sweets and sugary beverages  Encouraged fluid intake  BMP 1 month            Worry Score: 2  History of Present Illness:    Last visit with Dr. Carlton 7/17/2024   BMs good with miralax/fiber.   Urine normal.  Anxiety doing well with zoloft.  AC breakfast 150 range, once was 300.  ON jardiance since 4/24.  Occas albuterol, about 5x per month.    Recheck creatinine      Ms. Mckeon returns for follow up blood pressure and BMP.  Started Jardiance in April.  Today, rates anxiety "3" (scale 1- 10)  Blood sugar - fluctuates , 160 >>>> 128  Reported B/P normal    Recent Fall:  []Yes  [x]No  Activity:  [x]Vigorous []Moderate []Sedentary  Twice weekly, walking, exercise class - aerobic and light weight lifting  Appetite:  [x]Good  []Fair  []Poor  Mood: [x]Stable []Anxious []Depressed   Insomnia: [x]Yes  []No.  Not using Trazodone  Miralax helps constipation. Denies  symptoms   Latest Reference Range & Units 01/08/24 11:44 06/10/24 10:47 07/17/24 14:58 08/13/24 09:14   BUN 8 - 23 mg/dL 19 31 (H) 32 (H) 24 (H)   Creatinine 0.5 - 1.4 mg/dL 1.3 1.8 (H) 1.7 (H) 1.8 (H)   eGFR >60 mL/min/1.73 m^2 42.1 ! 28.3 ! 30.3 ! 28.3 !       Denies fever, chills, URI symptoms, chest pain, SOB, palpitations, vomiting, abdominal pain, diarrhea, no gross neuro deficits, urinary symptoms and leg swelling.      The following were reviewed: Active problem list, medication list, allergies, family history, social history, and Health Maintenance.     History:  Past Medical History:   Diagnosis Date    Anxiety     Asthma     Colon polyp     Diabetes mellitus, type 2     Diabetic retinopathy     DM (diabetes mellitus) 2008     11/07/2018    Hyperlipidemia     Hypertension     Hypothyroidism     " Primary open angle glaucoma of both eyes, mild stage 1/2/2018    Pyelonephritis     Recurrent UTI     Dr. Lira follows    Stage 3a chronic kidney disease 3/9/2022     Past Surgical History:   Procedure Laterality Date    HYSTERECTOMY       Family History   Problem Relation Name Age of Onset    Cancer Mother          pancreatic    Hypertension Mother      Cancer Father          prostate    Stroke Brother      Cataracts Brother      Glaucoma Brother      Lupus Sister       Patient Active Problem List   Diagnosis    Hypertension associated with diabetes    Mild intermittent asthma without complication    Vitamin D deficiency    Anxiety    Recurrent UTI    Controlled type 2 diabetes mellitus with diabetic polyneuropathy, without long-term current use of insulin    Family history of glaucoma    Primary open angle glaucoma of both eyes, mild stage    Hyperlipidemia associated with type 2 diabetes mellitus    Acquired hypothyroidism    History of colon polyps    GERD (gastroesophageal reflux disease)    Bilateral hearing loss    Stage 3b chronic kidney disease    Type 2 diabetes mellitus with stable proliferative retinopathy of both eyes, without long-term current use of insulin    Acute kidney injury superimposed on chronic kidney disease     Review of patient's allergies indicates:   Allergen Reactions    Sulfa (sulfonamide antibiotics) Anaphylaxis    Aspirin Other (See Comments)     Stomach pain    Methocarbamol        Medications:  Current Outpatient Medications on File Prior to Visit   Medication Sig Dispense Refill    aspirin 81 MG Chew Take 1 tablet (81 mg total) by mouth every other day.  0    azelastine (ASTELIN) 137 mcg (0.1 %) nasal spray 2 sprays 2 (two) times daily.      benzonatate (TESSALON) 100 MG capsule Take by mouth.      blood sugar diagnostic (CONTOUR NEXT TEST STRIPS) Strp CHECK BLOOD SUGAR TWICE DAILY 200 each 2    blood sugar diagnostic Strp Check sugars daily prn 100 strip 3    blood-glucose  meter kit To check BG 2 times daily, to use with insurance preferred meter  Dx:  E11.9 1 each 0    brimonidine 0.2% (ALPHAGAN) 0.2 % Drop Place 1 drop into both eyes 2 (two) times a day. 15 mL 3    CALCIUM CARBONATE (CALCIUM 300 ORAL) Take by mouth.      cranberry conc-C-bacillus coag 250-30-50 mg-mg-million Tab Take by mouth.      cyanocobalamin, vitamin B-12, 5,000 mcg Subl Place under the tongue.      empagliflozin (JARDIANCE) 10 mg tablet Take 1 tablet (10 mg total) by mouth once daily. 30 tablet 11    fluticasone (FLONASE) 50 mcg/actuation nasal spray 2 sprays by Each Nare route once daily. 16 g 6    fluticasone-salmeterol diskus inhaler 250-50 mcg Inhale 1 puff into the lungs 2 (two) times daily. 60 each 11    ipratropium (ATROVENT) 0.03 % nasal spray 2 sprays by Nasal route 2 (two) times daily. 30 mL 4    ketorolac 0.5% (ACULAR) 0.5 % Drop ketorolac 0.5 % eye drops      lancets Misc To check BG 2 times daily, to use with insurance preferred meter  Dx:  E11.9 200 each 3    latanoprost 0.005 % ophthalmic solution INSTILL 1 DROP IN BOTH EYES EVERY EVENING 2.5 mL 2    latanoprost 0.005 % ophthalmic solution Place 1 drop into both eyes once daily. 2.5 mL 3    levocetirizine (XYZAL) 5 MG tablet levocetirizine 5 mg tablet      levothyroxine (SYNTHROID) 25 MCG tablet Take 1 tablet (25 mcg total) by mouth once daily. 90 tablet 1    omeprazole (PRILOSEC) 40 MG capsule Take 1 capsule (40 mg total) by mouth once daily. For acid reflux 90 capsule 3    PHENAZOPYRIDINE HCL (AZO ORAL) Take by mouth.      polyethylene glycol (GLYCOLAX) 17 gram/dose powder MIX 17 GRAMS ACCORDING TO PACKAGE DIRECTIONS AND DRINK ONCE DAILY 510 g 3    potassium chloride SA (K-DUR,KLOR-CON) 20 MEQ tablet TAKE 1 TABLET(20 MEQ) BY MOUTH EVERY DAY 90 tablet 3    rosuvastatin (CRESTOR) 20 MG tablet Take 1 tablet (20 mg total) by mouth once daily. 90 tablet 3    sertraline (ZOLOFT) 50 MG tablet TAKE 1 AND 1/2 TABLETS(75 MG) BY MOUTH EVERY EVENING 135  tablet 3    SITagliptin phosphate (JANUVIA) 50 MG Tab Take 1 tablet (50 mg total) by mouth once daily. 90 tablet 3    traZODone (DESYREL) 100 MG tablet Take 1 tablet (100 mg total) by mouth every evening. 30 tablet 11    [DISCONTINUED] olmesartan-amLODIPin-hcthiazid 40-10-12.5 mg Tab TAKE 1 TABLET BY MOUTH EVERY DAY 90 tablet 3     No current facility-administered medications on file prior to visit.       Medications have been reviewed and reconciled with patient at visit today.      Exam:  Vitals:    08/15/24 1312   BP: 128/62   Pulse: 82   Temp: 97.3 °F (36.3 °C)     Weight: 65.9 kg (145 lb 4.5 oz)   Body mass index is 26.57 kg/m².      BP Readings from Last 3 Encounters:   08/15/24 128/62   07/17/24 128/64   06/13/24 122/62     Wt Readings from Last 3 Encounters:   08/15/24 65.9 kg (145 lb 4.5 oz)   07/17/24 64.7 kg (142 lb 8.4 oz)   06/13/24 63.3 kg (139 lb 10.6 oz)         Physical Exam  Vitals reviewed.   Constitutional:       General: She is not in acute distress.     Appearance: Normal appearance.   HENT:      Head: Normocephalic and atraumatic.      Mouth/Throat:      Mouth: Mucous membranes are moist.   Eyes:      Conjunctiva/sclera: Conjunctivae normal.   Cardiovascular:      Rate and Rhythm: Normal rate and regular rhythm.      Heart sounds: No murmur heard.  Pulmonary:      Effort: Pulmonary effort is normal. No respiratory distress.      Breath sounds: Normal breath sounds.      Comments: Cough noted  Abdominal:      Palpations: Abdomen is soft. There is no mass.      Tenderness: There is no abdominal tenderness.   Musculoskeletal:         General: Normal range of motion.      Cervical back: Normal range of motion and neck supple.      Right lower leg: No edema.      Left lower leg: No edema.   Lymphadenopathy:      Cervical: No cervical adenopathy.   Skin:     General: Skin is warm and dry.   Neurological:      Mental Status: She is alert and oriented to person, place, and time. Mental status is at  baseline.   Psychiatric:         Mood and Affect: Mood normal.         Thought Content: Thought content normal.              Laboratory Reviewed:     Lab Results   Component Value Date    WBC 5.30 06/10/2024    HGB 11.5 (L) 06/10/2024    HCT 35.4 (L) 06/10/2024     06/10/2024    CHOL 169 06/10/2024    TRIG 86 06/10/2024    HDL 64 06/10/2024    ALT 20 06/10/2024    AST 19 06/10/2024     (L) 08/13/2024    K 4.7 08/13/2024     08/13/2024    CREATININE 1.8 (H) 08/13/2024    BUN 24 (H) 08/13/2024    CO2 24 08/13/2024    TSH 2.152 06/10/2024    INR 1.0 09/21/2014    HGBA1C 8.8 (H) 06/10/2024       Screening or Prevention Patient's value Goal Complete/Controlled?   HgA1C Testing and Control   Lab Results   Component Value Date    HGBA1C 8.8 (H) 06/10/2024      Annually/Less than 8% No   Lipid profile : 06/10/2024 Annually Yes   LDL control Lab Results   Component Value Date    LDLCALC 87.8 06/10/2024    Annually/Less than 100 mg/dl  Yes   Nephropathy screening Lab Results   Component Value Date    LABMICR 11.0 06/10/2024     Lab Results   Component Value Date    PROTEINUA Negative 03/24/2017    Annually Yes   Blood pressure BP Readings from Last 1 Encounters:   08/15/24 128/62    Less than 140/90 Yes   Dilated retinal exam : 09/26/2023 Annually Yes   Foot exam   Most Recent Foot Exam Date: Not Found Annually Yes       Health Maintenance  Health Maintenance Topics with due status: Not Due       Topic Last Completion Date    Influenza Vaccine 10/12/2023    TETANUS VACCINE 04/11/2024    Diabetes Urine Screening 06/10/2024    Lipid Panel 06/10/2024     Health Maintenance Due   Topic Date Due    RSV Vaccine (Age 60+ and Pregnant patients) (1 - 1-dose 60+ series) Never done    COVID-19 Vaccine (5 - 2023-24 season) 09/01/2023    Hemoglobin A1c  09/10/2024    Eye Exam  09/26/2024    DEXA Scan  10/26/2024               -Patient's lab results were reviewed and discussed with patient  -Treatment options and  alternatives were discussed with the patient. Patient expressed understanding. Patient was given the opportunity to ask questions and be an active participant in their medical care. Patient had no further questions or concerns at this time.         Future Appointments   Date Time Provider Department Center   9/12/2024  2:40 PM Apple Cooley NP [facility]   11/7/2024  2:20 PM Jen Carlton MD [facility]          After visit summary printed and given to patient upon discharge.  Patient goals and care plan are included in After visit summary.      The following issues were discussed: The primary encounter diagnosis was Hypertension associated with diabetes. Diagnoses of Stage 3b chronic kidney disease, YANNICK (acute kidney injury), Controlled type 2 diabetes mellitus with diabetic polyneuropathy, without long-term current use of insulin, and Acute kidney injury superimposed on chronic kidney disease were also pertinent to this visit.    Health maintenance needs, recent test results and goals of care discussed with pt and questions answered.           MALLORIE Wise, NP-C  [facility] [address] [place] [address]

## 2024-08-15 NOTE — ASSESSMENT & PLAN NOTE
Reports normotensive BP  Continue olmesartan 40 mg and amlodipine 10 mg  HCTZ 12.5 mg stopped due to YANNICK  BMP in 1 month and F/U

## 2024-08-15 NOTE — ASSESSMENT & PLAN NOTE
Latest Reference Range & Units 01/08/24 11:44 06/10/24 10:47 07/17/24 14:58 08/13/24 09:14   Creatinine 0.5 - 1.4 mg/dL 1.3 1.8 (H) 1.7 (H) 1.8 (H)   eGFR >60 mL/min/1.73 m^2 42.1 ! 28.3 ! 30.3 ! 28.3 !   Control blood pressure and tighter control on blood glucose  Decrease volume of sweets and sugary beverages  Encouraged fluid intake  BMP 1 month

## 2024-08-29 ENCOUNTER — TELEPHONE (OUTPATIENT)
Dept: PRIMARY CARE CLINIC | Facility: CLINIC | Age: 79
End: 2024-08-29
Payer: MEDICARE

## 2024-08-29 NOTE — TELEPHONE ENCOUNTER
Please call pt, needing current BP readings since 8/15/24  Taking amlodipine 10 mg daily  Olmesartan 40 mg daily  HcTZ was stopped        ----- Message from Apple Cooley NP sent at 8/15/2024  1:46 PM CDT -----  Blood pressure recordings

## 2024-09-12 ENCOUNTER — OFFICE VISIT (OUTPATIENT)
Dept: PRIMARY CARE CLINIC | Facility: CLINIC | Age: 79
End: 2024-09-12
Payer: MEDICARE

## 2024-09-12 VITALS
TEMPERATURE: 98 F | WEIGHT: 144.75 LBS | HEART RATE: 70 BPM | OXYGEN SATURATION: 97 % | BODY MASS INDEX: 26.64 KG/M2 | SYSTOLIC BLOOD PRESSURE: 114 MMHG | DIASTOLIC BLOOD PRESSURE: 70 MMHG | HEIGHT: 62 IN

## 2024-09-12 DIAGNOSIS — N18.9 ACUTE KIDNEY INJURY SUPERIMPOSED ON CHRONIC KIDNEY DISEASE: Primary | ICD-10-CM

## 2024-09-12 DIAGNOSIS — I15.2 HYPERTENSION ASSOCIATED WITH DIABETES: ICD-10-CM

## 2024-09-12 DIAGNOSIS — N18.32 STAGE 3B CHRONIC KIDNEY DISEASE: ICD-10-CM

## 2024-09-12 DIAGNOSIS — E11.3553 TYPE 2 DIABETES MELLITUS WITH STABLE PROLIFERATIVE RETINOPATHY OF BOTH EYES, WITHOUT LONG-TERM CURRENT USE OF INSULIN: ICD-10-CM

## 2024-09-12 DIAGNOSIS — N17.9 ACUTE KIDNEY INJURY SUPERIMPOSED ON CHRONIC KIDNEY DISEASE: Primary | ICD-10-CM

## 2024-09-12 DIAGNOSIS — E11.59 HYPERTENSION ASSOCIATED WITH DIABETES: ICD-10-CM

## 2024-09-12 DIAGNOSIS — Z23 NEED FOR VACCINATION: ICD-10-CM

## 2024-09-12 PROCEDURE — 99999 PR PBB SHADOW E&M-EST. PATIENT-LVL V: CPT | Mod: PBBFAC,,, | Performed by: NURSE PRACTITIONER

## 2024-09-12 RX ORDER — DOXYCYCLINE 100 MG/1
100 CAPSULE ORAL
COMMUNITY
Start: 2024-06-19

## 2024-09-12 NOTE — PROGRESS NOTES
Natalia Mckeon  09/12/2024  [id number]    Jen Carlton MD  Patient Care Team:  Jen Carlton MD as PCP - General (Internal Medicine)  ROSE Chairez MD as Consulting Physician (Ophthalmology)  Alyssia Mckeon LPN as Care Coordinator  Ricky Resendez MD as Consulting Physician (Gastroenterology)  Mesha Obrien MD (Obstetrics and Gynecology)  Jaspreet Phillip MD as Consulting Physician (Ophthalmology)  Jl Su MD as Consulting Physician (Otolaryngology)  Naveen, Samuel Monroy MD as Consulting Physician (Family Medicine)  Services, [facility] - [facility] Primary Care Note      Chief Complaint:  Chief Complaint   Patient presents with    Follow-up         Assessment/Plan:  1. Acute kidney injury superimposed on chronic kidney disease  Assessment & Plan:    Latest Reference Range & Units 01/08/24 11:44 06/10/24 10:47 07/17/24 14:58 08/13/24 09:14   Creatinine 0.5 - 1.4 mg/dL 1.3 1.8 (H) 1.7 (H) 1.8 (H)   eGFR >60 mL/min/1.73 m^2 42.1 ! 28.3 ! 30.3 ! 28.3 !   Hold HCTZ  Control blood pressure and tighter control on blood glucose - eating CHO prior to bedtime  Decrease volume of sweets and sugary beverages  Encouraged fluid intake  BMP 1 month      2. Hypertension associated with diabetes  Assessment & Plan:  At last visit pt was to hold the HCTZ and take olmesartan and amlodipine only  Today, she states she did not stop the HCTZ- explained rationale for holding HCTZ due to renal fxn decline  B/P normotensive  Says she will hold the HCTZ and return at next visit  Check BMP in November visit      3. Stage 3b chronic kidney disease  Assessment & Plan:  Did not hold HCTZ at last visit  Continues Jardiance  Repeat BMP at next visit      4. Type 2 diabetes mellitus with stable proliferative retinopathy of both eyes, without long-term current use of insulin    5. Need for vaccination  -     Influenza - Trivalent (Adjuvanted)          Worry Score: 2  History of  Present Illness:      Last visit with Dr. Carlton on 6/13/2024    At last visit was following blood pressure and blood sugar and effects to renal function.  She was instructed to continue olmesartan and amlodipine but HCTZ was stopped due to renal function.  Today, she reports that she has continued the HCTZ.  Also, says her blood sugar is elevated and admits to dietary indiscretion - has been eating CHO at night like cheetos. Last night 200 blood sugar and 170 this AM.   Metformin was 4/24 with Jardiance 10 mg prescribed  Januvia  prescribed 6/24  Has an occasional cough - on inhaled steroid daily  Other symptoms denied.         Denies fever, chills, URI symptoms, chest pain, SOB, palpitations, vomiting, diarrhea, no gross neuro deficits, urinary symptoms and leg swelling.      The following were reviewed: Active problem list, medication list, allergies, family history, social history, and Health Maintenance.     History:  Past Medical History:   Diagnosis Date    Anxiety     Asthma     Colon polyp     Diabetes mellitus, type 2     Diabetic retinopathy     DM (diabetes mellitus) 2008     11/07/2018    Hyperlipidemia     Hypertension     Hypothyroidism     Primary open angle glaucoma of both eyes, mild stage 1/2/2018    Pyelonephritis     Recurrent UTI     Dr. Lira follows    Stage 3a chronic kidney disease 3/9/2022     Past Surgical History:   Procedure Laterality Date    HYSTERECTOMY       Family History   Problem Relation Name Age of Onset    Cancer Mother          pancreatic    Hypertension Mother      Cancer Father          prostate    Stroke Brother      Cataracts Brother      Glaucoma Brother      Lupus Sister       Patient Active Problem List   Diagnosis    Hypertension associated with diabetes    Mild intermittent asthma without complication    Vitamin D deficiency    Anxiety    Recurrent UTI    Controlled type 2 diabetes mellitus with diabetic polyneuropathy, without long-term current use of  insulin    Family history of glaucoma    Primary open angle glaucoma of both eyes, mild stage    Hyperlipidemia associated with type 2 diabetes mellitus    Acquired hypothyroidism    History of colon polyps    GERD (gastroesophageal reflux disease)    Bilateral hearing loss    Stage 3b chronic kidney disease    Type 2 diabetes mellitus with stable proliferative retinopathy of both eyes, without long-term current use of insulin    Acute kidney injury superimposed on chronic kidney disease     Review of patient's allergies indicates:   Allergen Reactions    Sulfa (sulfonamide antibiotics) Anaphylaxis    Aspirin Other (See Comments)     Stomach pain    Methocarbamol        Medications:  Current Outpatient Medications on File Prior to Visit   Medication Sig Dispense Refill    amLODIPine (NORVASC) 10 MG tablet Take 1 tablet (10 mg total) by mouth once daily. 30 tablet 0    aspirin 81 MG Chew Take 1 tablet (81 mg total) by mouth every other day.  0    azelastine (ASTELIN) 137 mcg (0.1 %) nasal spray 2 sprays 2 (two) times daily.      benzonatate (TESSALON) 100 MG capsule Take by mouth.      blood sugar diagnostic (CONTOUR NEXT TEST STRIPS) Strp CHECK BLOOD SUGAR TWICE DAILY 200 each 2    blood sugar diagnostic Strp Check sugars daily prn 100 strip 3    blood-glucose meter kit To check BG 2 times daily, to use with insurance preferred meter  Dx:  E11.9 1 each 0    brimonidine 0.2% (ALPHAGAN) 0.2 % Drop Place 1 drop into both eyes 2 (two) times a day. 15 mL 3    CALCIUM CARBONATE (CALCIUM 300 ORAL) Take by mouth.      cranberry conc-C-bacillus coag 250-30-50 mg-mg-million Tab Take by mouth.      cyanocobalamin, vitamin B-12, 5,000 mcg Subl Place under the tongue.      doxycycline (MONODOX) 100 MG capsule Take 100 mg by mouth.      empagliflozin (JARDIANCE) 10 mg tablet Take 1 tablet (10 mg total) by mouth once daily. 30 tablet 11    fluticasone (FLONASE) 50 mcg/actuation nasal spray 2 sprays by Each Nare route once daily.  16 g 6    fluticasone-salmeterol diskus inhaler 250-50 mcg Inhale 1 puff into the lungs 2 (two) times daily. 60 each 11    ipratropium (ATROVENT) 0.03 % nasal spray 2 sprays by Nasal route 2 (two) times daily. 30 mL 4    ketorolac 0.5% (ACULAR) 0.5 % Drop ketorolac 0.5 % eye drops      lancets Misc To check BG 2 times daily, to use with insurance preferred meter  Dx:  E11.9 200 each 3    latanoprost 0.005 % ophthalmic solution INSTILL 1 DROP IN BOTH EYES EVERY EVENING 2.5 mL 2    latanoprost 0.005 % ophthalmic solution Place 1 drop into both eyes once daily. 2.5 mL 3    levocetirizine (XYZAL) 5 MG tablet levocetirizine 5 mg tablet      levothyroxine (SYNTHROID) 25 MCG tablet Take 1 tablet (25 mcg total) by mouth once daily. 90 tablet 1    olmesartan (BENICAR) 40 MG tablet Take 1 tablet (40 mg total) by mouth once daily. 30 tablet 0    omeprazole (PRILOSEC) 40 MG capsule Take 1 capsule (40 mg total) by mouth once daily. For acid reflux 90 capsule 3    PHENAZOPYRIDINE HCL (AZO ORAL) Take by mouth.      polyethylene glycol (GLYCOLAX) 17 gram/dose powder MIX 17 GRAMS ACCORDING TO PACKAGE DIRECTIONS AND DRINK ONCE DAILY 510 g 3    potassium chloride SA (K-DUR,KLOR-CON) 20 MEQ tablet TAKE 1 TABLET(20 MEQ) BY MOUTH EVERY DAY 90 tablet 3    rosuvastatin (CRESTOR) 20 MG tablet Take 1 tablet (20 mg total) by mouth once daily. 90 tablet 3    sertraline (ZOLOFT) 50 MG tablet TAKE 1 AND 1/2 TABLETS(75 MG) BY MOUTH EVERY EVENING 135 tablet 3    SITagliptin phosphate (JANUVIA) 50 MG Tab Take 1 tablet (50 mg total) by mouth once daily. 90 tablet 3    traZODone (DESYREL) 100 MG tablet Take 1 tablet (100 mg total) by mouth every evening. 30 tablet 11     No current facility-administered medications on file prior to visit.       Medications have been reviewed and reconciled with patient at visit today.      Exam:  Vitals:    09/12/24 1432   BP: 114/70   Pulse: 70   Temp: 97.5 °F (36.4 °C)     Weight: 65.6 kg (144 lb 11.7 oz)   Body  mass index is 26.47 kg/m².      BP Readings from Last 3 Encounters:   09/12/24 114/70   08/15/24 128/62   07/17/24 128/64     Wt Readings from Last 3 Encounters:   09/12/24 65.6 kg (144 lb 11.7 oz)   08/15/24 65.9 kg (145 lb 4.5 oz)   07/17/24 64.7 kg (142 lb 8.4 oz)         Physical Exam         Laboratory Reviewed:     Lab Results   Component Value Date    WBC 5.30 06/10/2024    HGB 11.5 (L) 06/10/2024    HCT 35.4 (L) 06/10/2024     06/10/2024    CHOL 169 06/10/2024    TRIG 86 06/10/2024    HDL 64 06/10/2024    ALT 20 06/10/2024    AST 19 06/10/2024     (L) 08/13/2024    K 4.7 08/13/2024     08/13/2024    CREATININE 1.8 (H) 08/13/2024    BUN 24 (H) 08/13/2024    CO2 24 08/13/2024    TSH 2.152 06/10/2024    INR 1.0 09/21/2014    HGBA1C 8.8 (H) 06/10/2024       Screening or Prevention Patient's value Goal Complete/Controlled?   HgA1C Testing and Control   Lab Results   Component Value Date    HGBA1C 8.8 (H) 06/10/2024      Annually/Less than 8% No   Lipid profile : 06/10/2024 Annually Yes   LDL control Lab Results   Component Value Date    LDLCALC 87.8 06/10/2024    Annually/Less than 100 mg/dl  Yes   Nephropathy screening Lab Results   Component Value Date    LABMICR 11.0 06/10/2024     Lab Results   Component Value Date    PROTEINUA Negative 03/24/2017    Annually Yes   Blood pressure BP Readings from Last 1 Encounters:   09/12/24 114/70    Less than 140/90 Yes   Dilated retinal exam : 09/26/2023 Annually Yes   Foot exam   Most Recent Foot Exam Date: Not Found Annually Yes       Health Maintenance  Health Maintenance Topics with due status: Not Due       Topic Last Completion Date    TETANUS VACCINE 04/11/2024    Diabetes Urine Screening 06/10/2024    Lipid Panel 06/10/2024     Health Maintenance Due   Topic Date Due    RSV Vaccine (Age 60+ and Pregnant patients) (1 - 1-dose 60+ series) Never done    COVID-19 Vaccine (5 - 2023-24 season) 09/01/2024    Hemoglobin A1c  09/10/2024    Eye Exam   09/26/2024    DEXA Scan  10/26/2024               -Patient's lab results were reviewed and discussed with patient  -Treatment options and alternatives were discussed with the patient. Patient expressed understanding. Patient was given the opportunity to ask questions and be an active participant in their medical care. Patient had no further questions or concerns at this time.         Future Appointments   Date Time Provider Department Center   11/7/2024  2:20 PM Jen Carlton MD [id number]          After visit summary printed and given to patient upon discharge.  Patient goals and care plan are included in After visit summary.      The following issues were discussed: The primary encounter diagnosis was Acute kidney injury superimposed on chronic kidney disease. Diagnoses of Hypertension associated with diabetes, Stage 3b chronic kidney disease, Type 2 diabetes mellitus with stable proliferative retinopathy of both eyes, without long-term current use of insulin, and Need for vaccination were also pertinent to this visit.    Health maintenance needs, recent test results and goals of care discussed with pt and questions answered.           MALLORIE Wise, NP-C  [facility] [address] [place] [address]

## 2024-09-12 NOTE — ASSESSMENT & PLAN NOTE
At last visit pt was to hold the HCTZ and take olmesartan and amlodipine only  Today, she states she did not stop the HCTZ- explained rationale for holding HCTZ due to renal fxn decline  B/P normotensive  Says she will hold the HCTZ and return at next visit  Check BMP in November visit

## 2024-09-12 NOTE — ASSESSMENT & PLAN NOTE
Latest Reference Range & Units 01/08/24 11:44 06/10/24 10:47 07/17/24 14:58 08/13/24 09:14   Creatinine 0.5 - 1.4 mg/dL 1.3 1.8 (H) 1.7 (H) 1.8 (H)   eGFR >60 mL/min/1.73 m^2 42.1 ! 28.3 ! 30.3 ! 28.3 !   Hold HCTZ  Control blood pressure and tighter control on blood glucose - eating CHO prior to bedtime  Decrease volume of sweets and sugary beverages  Encouraged fluid intake  BMP 1 month

## 2024-09-15 DIAGNOSIS — N18.32 STAGE 3B CHRONIC KIDNEY DISEASE: ICD-10-CM

## 2024-09-15 DIAGNOSIS — I15.2 HYPERTENSION ASSOCIATED WITH DIABETES: ICD-10-CM

## 2024-09-15 DIAGNOSIS — E11.59 HYPERTENSION ASSOCIATED WITH DIABETES: ICD-10-CM

## 2024-09-16 RX ORDER — AMLODIPINE BESYLATE 10 MG/1
10 TABLET ORAL
Qty: 30 TABLET | Refills: 11 | Status: SHIPPED | OUTPATIENT
Start: 2024-09-16

## 2024-09-16 RX ORDER — OLMESARTAN MEDOXOMIL 40 MG/1
40 TABLET ORAL
Qty: 30 TABLET | Refills: 11 | Status: SHIPPED | OUTPATIENT
Start: 2024-09-16

## 2024-10-11 DIAGNOSIS — R10.13 DYSPEPSIA: ICD-10-CM

## 2024-10-11 RX ORDER — OMEPRAZOLE 40 MG/1
40 CAPSULE, DELAYED RELEASE ORAL DAILY
Qty: 90 CAPSULE | Refills: 3 | Status: SHIPPED | OUTPATIENT
Start: 2024-10-11

## 2024-11-01 NOTE — PROGRESS NOTES
"Subjective:      Patient ID: Natalia Mckeon is a 79 y.o. female.    Chief Complaint: Follow-up (3 month f/u), Medication Problem (Pt wants to discuss medications), and Results (Pt wants print out of last labs)      HPI  Here for follow up of medical problems.  AC breakfast sugars 140-160, and also around that range before dinner.  BP at home 132/68.  8-9 glasses of water a day.  Exercises twice a week.  Thinks januvia causes her to eat a lot.  No f/c/sw/cough.  Breathing well, not using advair lately.    BMs and urine normal.  Anxiety doing well on ssri.      HM: 9/24 fluvax, 1/21 covid vaccines/booster, 4/24 HAV, 7/15 [id number], 2/18 booster [id number], 7/20 [id number], 4/24 Tdap, 10/14 zostervax at pharmacy, 2019 Shingrix x2, 11/23 [facility]/Gyn Dr. Obrien, bmd per her, 3/24 Cscope Dr. Auguste rep 5y, 9/23 Eye Dr. Phillip, 11/16 HCV neg, Cards Dr. Jm Vasquez, Urol Dr. Lira.     Review of Systems   Constitutional:  Negative for chills, diaphoresis and fever.   Respiratory:  Negative for cough and shortness of breath.    Cardiovascular:  Negative for chest pain, palpitations and leg swelling.   Gastrointestinal:  Negative for blood in stool, constipation, diarrhea, nausea and vomiting.   Genitourinary:  Negative for dysuria, frequency and hematuria.   Psychiatric/Behavioral:  The patient is not nervous/anxious.          Objective:   /68 (BP Location: Left arm, Patient Position: Sitting)   Pulse 81   Temp 97.4 °F (36.3 °C) (Skin)   Ht 5' 2" (1.575 m)   Wt 65.9 kg (145 lb 6.3 oz)   SpO2 96%   BMI 26.59 kg/m²     Physical Exam  Constitutional:       Appearance: She is well-developed.   Neck:      Thyroid: No thyroid mass.      Vascular: No carotid bruit.   Cardiovascular:      Rate and Rhythm: Normal rate and regular rhythm.      Heart sounds: No murmur heard.     No friction rub. No gallop.   Pulmonary:      Effort: Pulmonary effort is normal.      Breath sounds: Normal breath sounds. No wheezing or rales. "   Abdominal:      General: Bowel sounds are normal.      Palpations: Abdomen is soft. There is no mass.      Tenderness: There is no abdominal tenderness.   Musculoskeletal:      Cervical back: Neck supple.   Lymphadenopathy:      Cervical: No cervical adenopathy.   Neurological:      Mental Status: She is alert and oriented to person, place, and time.             Assessment:       1. Controlled type 2 diabetes mellitus with diabetic polyneuropathy, without long-term current use of insulin    2. Hyperlipidemia associated with type 2 diabetes mellitus    3. Hypertension associated with diabetes    4. Mild intermittent asthma without complication    5. Acquired hypothyroidism    6. Anxiety    7. Stage 3b chronic kidney disease          Plan:     1. Controlled type 2 diabetes mellitus with diabetic polyneuropathy, without long-term current use of insulin- stop januvia, cont jardiance.  Check lab now.  Overview:  Januvia 50mg daily,  Jardiance 10mg daily.    Orders:  -     Hemoglobin A1C; Future; Expected date: 11/07/2024    2. Hyperlipidemia associated with type 2 diabetes mellitus- recheck lab now.  Overview:  Rosuva 20mg daily.    Orders:  -     Lipid Panel; Future; Expected date: 11/07/2024    3. Hypertension associated with diabetes- monitor BPs at home, bring list and cuff to next appt.  Overview:  Benicar 40mg daily,  Amlodipine 10mg daily.    4. Mild intermittent asthma without complication- off rx now.  Overview:  Advair 250-50, 1p BID.      5. Acquired hypothyroidism- Clinically stable, continue present treatment.  Overview:  Synthroid 25mcg daily.      6. Anxiety- cont rx.  Overview:  Sertraline 50mg daily.      7. Stage 3b chronic kidney disease- recheck now.  -     Basic Metabolic Panel; Future; Expected date: 11/07/2024     RTC 1mo.

## 2024-11-07 ENCOUNTER — OFFICE VISIT (OUTPATIENT)
Dept: PRIMARY CARE CLINIC | Facility: CLINIC | Age: 79
End: 2024-11-07
Payer: MEDICARE

## 2024-11-07 VITALS
WEIGHT: 145.38 LBS | BODY MASS INDEX: 26.75 KG/M2 | SYSTOLIC BLOOD PRESSURE: 132 MMHG | HEART RATE: 81 BPM | TEMPERATURE: 97 F | OXYGEN SATURATION: 96 % | HEIGHT: 62 IN | DIASTOLIC BLOOD PRESSURE: 68 MMHG

## 2024-11-07 DIAGNOSIS — F41.9 ANXIETY: ICD-10-CM

## 2024-11-07 DIAGNOSIS — J45.20 MILD INTERMITTENT ASTHMA WITHOUT COMPLICATION: ICD-10-CM

## 2024-11-07 DIAGNOSIS — E78.5 HYPERLIPIDEMIA ASSOCIATED WITH TYPE 2 DIABETES MELLITUS: ICD-10-CM

## 2024-11-07 DIAGNOSIS — N18.32 STAGE 3B CHRONIC KIDNEY DISEASE: ICD-10-CM

## 2024-11-07 DIAGNOSIS — E11.42 CONTROLLED TYPE 2 DIABETES MELLITUS WITH DIABETIC POLYNEUROPATHY, WITHOUT LONG-TERM CURRENT USE OF INSULIN: Primary | ICD-10-CM

## 2024-11-07 DIAGNOSIS — E11.69 HYPERLIPIDEMIA ASSOCIATED WITH TYPE 2 DIABETES MELLITUS: ICD-10-CM

## 2024-11-07 DIAGNOSIS — E11.59 HYPERTENSION ASSOCIATED WITH DIABETES: ICD-10-CM

## 2024-11-07 DIAGNOSIS — I15.2 HYPERTENSION ASSOCIATED WITH DIABETES: ICD-10-CM

## 2024-11-07 DIAGNOSIS — E03.9 ACQUIRED HYPOTHYROIDISM: ICD-10-CM

## 2024-11-07 LAB
ANION GAP SERPL CALC-SCNC: 9 MMOL/L (ref 8–16)
BUN SERPL-MCNC: 25 MG/DL (ref 8–23)
CALCIUM SERPL-MCNC: 10.5 MG/DL (ref 8.7–10.5)
CHLORIDE SERPL-SCNC: 100 MMOL/L (ref 95–110)
CHOLEST SERPL-MCNC: 158 MG/DL (ref 120–199)
CHOLEST/HDLC SERPL: 2.5 {RATIO} (ref 2–5)
CO2 SERPL-SCNC: 26 MMOL/L (ref 23–29)
CREAT SERPL-MCNC: 1.5 MG/DL (ref 0.5–1.4)
EST. GFR  (NO RACE VARIABLE): 35.2 ML/MIN/1.73 M^2
ESTIMATED AVG GLUCOSE: 194 MG/DL (ref 68–131)
GLUCOSE SERPL-MCNC: 109 MG/DL (ref 70–110)
HBA1C MFR BLD: 8.4 % (ref 4–5.6)
HDLC SERPL-MCNC: 64 MG/DL (ref 40–75)
HDLC SERPL: 40.5 % (ref 20–50)
LDLC SERPL CALC-MCNC: 77.8 MG/DL (ref 63–159)
NONHDLC SERPL-MCNC: 94 MG/DL
POTASSIUM SERPL-SCNC: 4.4 MMOL/L (ref 3.5–5.1)
SODIUM SERPL-SCNC: 135 MMOL/L (ref 136–145)
TRIGL SERPL-MCNC: 81 MG/DL (ref 30–150)

## 2024-11-07 PROCEDURE — 80061 LIPID PANEL: CPT | Performed by: INTERNAL MEDICINE

## 2024-11-07 PROCEDURE — 99999 PR PBB SHADOW E&M-EST. PATIENT-LVL III: CPT | Mod: PBBFAC,,, | Performed by: INTERNAL MEDICINE

## 2024-11-07 PROCEDURE — 83036 HEMOGLOBIN GLYCOSYLATED A1C: CPT | Performed by: INTERNAL MEDICINE

## 2024-11-07 PROCEDURE — 80048 BASIC METABOLIC PNL TOTAL CA: CPT | Performed by: INTERNAL MEDICINE

## 2024-11-20 DIAGNOSIS — F41.9 ANXIETY: ICD-10-CM

## 2024-11-20 RX ORDER — SERTRALINE HYDROCHLORIDE 50 MG/1
TABLET, FILM COATED ORAL
Qty: 135 TABLET | Refills: 3 | Status: SHIPPED | OUTPATIENT
Start: 2024-11-20

## 2024-12-04 DIAGNOSIS — Z78.0 MENOPAUSE: ICD-10-CM

## 2024-12-21 DIAGNOSIS — E11.42 CONTROLLED TYPE 2 DIABETES MELLITUS WITH DIABETIC POLYNEUROPATHY, WITHOUT LONG-TERM CURRENT USE OF INSULIN: ICD-10-CM

## 2024-12-23 RX ORDER — BLOOD-GLUCOSE METER
EACH MISCELLANEOUS
Qty: 200 STRIP | Refills: 6 | Status: SHIPPED | OUTPATIENT
Start: 2024-12-23

## 2025-01-07 ENCOUNTER — OFFICE VISIT (OUTPATIENT)
Dept: PRIMARY CARE CLINIC | Facility: CLINIC | Age: 80
End: 2025-01-07
Payer: MEDICARE

## 2025-01-07 VITALS
HEIGHT: 62 IN | SYSTOLIC BLOOD PRESSURE: 128 MMHG | TEMPERATURE: 98 F | DIASTOLIC BLOOD PRESSURE: 64 MMHG | BODY MASS INDEX: 26.45 KG/M2 | HEART RATE: 82 BPM | OXYGEN SATURATION: 97 % | WEIGHT: 143.75 LBS

## 2025-01-07 DIAGNOSIS — E11.59 HYPERTENSION ASSOCIATED WITH DIABETES: ICD-10-CM

## 2025-01-07 DIAGNOSIS — N18.32 STAGE 3B CHRONIC KIDNEY DISEASE: ICD-10-CM

## 2025-01-07 DIAGNOSIS — N39.0 RECURRENT UTI: Primary | ICD-10-CM

## 2025-01-07 DIAGNOSIS — E11.3553 TYPE 2 DIABETES MELLITUS WITH STABLE PROLIFERATIVE RETINOPATHY OF BOTH EYES, WITHOUT LONG-TERM CURRENT USE OF INSULIN: ICD-10-CM

## 2025-01-07 DIAGNOSIS — E11.69 HYPERLIPIDEMIA ASSOCIATED WITH TYPE 2 DIABETES MELLITUS: ICD-10-CM

## 2025-01-07 DIAGNOSIS — I15.2 HYPERTENSION ASSOCIATED WITH DIABETES: ICD-10-CM

## 2025-01-07 DIAGNOSIS — E78.5 HYPERLIPIDEMIA ASSOCIATED WITH TYPE 2 DIABETES MELLITUS: ICD-10-CM

## 2025-01-07 LAB
BACTERIA #/AREA URNS AUTO: ABNORMAL /HPF
BILIRUB UR QL STRIP: NEGATIVE
CLARITY UR REFRACT.AUTO: CLEAR
COLOR UR AUTO: YELLOW
GLUCOSE UR QL STRIP: ABNORMAL
HGB UR QL STRIP: NEGATIVE
KETONES UR QL STRIP: NEGATIVE
LEUKOCYTE ESTERASE UR QL STRIP: ABNORMAL
MICROSCOPIC COMMENT: ABNORMAL
NITRITE UR QL STRIP: NEGATIVE
PH UR STRIP: 6 [PH] (ref 5–8)
PROT UR QL STRIP: NEGATIVE
RBC #/AREA URNS AUTO: 1 /HPF (ref 0–4)
SP GR UR STRIP: 1.01 (ref 1–1.03)
SQUAMOUS #/AREA URNS AUTO: 0 /HPF
URN SPEC COLLECT METH UR: ABNORMAL
WBC #/AREA URNS AUTO: >100 /HPF (ref 0–5)
WBC CLUMPS UR QL AUTO: ABNORMAL
YEAST UR QL AUTO: ABNORMAL

## 2025-01-07 PROCEDURE — 1157F ADVNC CARE PLAN IN RCRD: CPT | Mod: CPTII,S$GLB,, | Performed by: NURSE PRACTITIONER

## 2025-01-07 PROCEDURE — 99214 OFFICE O/P EST MOD 30 MIN: CPT | Mod: S$GLB,,, | Performed by: NURSE PRACTITIONER

## 2025-01-07 PROCEDURE — 87147 CULTURE TYPE IMMUNOLOGIC: CPT | Performed by: NURSE PRACTITIONER

## 2025-01-07 PROCEDURE — 1125F AMNT PAIN NOTED PAIN PRSNT: CPT | Mod: CPTII,S$GLB,, | Performed by: NURSE PRACTITIONER

## 2025-01-07 PROCEDURE — 87088 URINE BACTERIA CULTURE: CPT | Performed by: NURSE PRACTITIONER

## 2025-01-07 PROCEDURE — 1101F PT FALLS ASSESS-DOCD LE1/YR: CPT | Mod: CPTII,S$GLB,, | Performed by: NURSE PRACTITIONER

## 2025-01-07 PROCEDURE — 3074F SYST BP LT 130 MM HG: CPT | Mod: CPTII,S$GLB,, | Performed by: NURSE PRACTITIONER

## 2025-01-07 PROCEDURE — 3288F FALL RISK ASSESSMENT DOCD: CPT | Mod: CPTII,S$GLB,, | Performed by: NURSE PRACTITIONER

## 2025-01-07 PROCEDURE — 99999 PR PBB SHADOW E&M-EST. PATIENT-LVL III: CPT | Mod: PBBFAC,,, | Performed by: NURSE PRACTITIONER

## 2025-01-07 PROCEDURE — 81001 URINALYSIS AUTO W/SCOPE: CPT | Performed by: NURSE PRACTITIONER

## 2025-01-07 PROCEDURE — 3078F DIAST BP <80 MM HG: CPT | Mod: CPTII,S$GLB,, | Performed by: NURSE PRACTITIONER

## 2025-01-07 PROCEDURE — 87086 URINE CULTURE/COLONY COUNT: CPT | Performed by: NURSE PRACTITIONER

## 2025-01-07 NOTE — PROGRESS NOTES
Natalia Mckeon  01/08/2025  [id number]    Jen Carlton MD  Patient Care Team:  Jen Carlton MD as PCP - General (Internal Medicine)  ROSE Chairez MD as Consulting Physician (Ophthalmology)  Ricky Resendez MD as Consulting Physician (Gastroenterology)  Mesha Obrien MD (Obstetrics and Gynecology)  Jaspreet Phillip MD as Consulting Physician (Ophthalmology)  Jl Su MD as Consulting Physician (Otolaryngology)  NaveenSamuel MD as Consulting Physician (Family Medicine)  [facility]  Adriana Salazar RN as         [facility]      Chief Complaint:  Chief Complaint   Patient presents with    Urinary Tract Infection     Painful urination x 1 week          Assessment/Plan:  1. Recurrent UTI  Assessment & Plan:  U/A indicates leukocytes and WBC  Obtain Cipro Rx prescribed by Dr. Lira  Phenazopyridine for dysuria  Encouraged increased clear fluid intake  Urine culture pending  F/U with Dr. Lira    Orders:  -     Urinalysis, Reflex to Urine Culture Urine, Clean Catch; Future; Expected date: 01/07/2025    2. Hypertension associated with diabetes  Overview:  Benicar 40mg daily,  Amlodipine 10mg daily.      Assessment & Plan:  B/P controlled on current regimen      3. Type 2 diabetes mellitus with stable proliferative retinopathy of both eyes, without long-term current use of insulin  Assessment & Plan:   Latest Reference Range & Units 06/10/24 10:47 11/07/24 14:27   Hemoglobin A1C External 4.0 - 5.6 % 8.8 (H) 8.4 (H)   Goal < 7  Continue metformin and Jardiance  Lower CHO intake  F/U with Opthalmology      4. Stage 3b chronic kidney disease  Assessment & Plan:  Stable at Stage 3 B  Encouraged fluid intake  Control blood pressure and need tighter control on blood sugar  Decrease CHO and sugar intake  Encouraged activity  Continue ARB and Jardiance (SGLT2i)      5. Hyperlipidemia associated with type 2 diabetes  mellitus  Overview:  Rosuva 20mg daily.    Assessment & Plan:  Lab Results   Component Value Date    LDLCALC 77.8 11/07/2024    Continue STATIN        Other orders  -     Urinalysis Microscopic  -     Urine culture          Worry Score: 2  History of Present Illness:    Last visit with Dr. Carlton 11/7/2024     Ms. Mckeon presents with reports of UTI symptoms.   Hx of recurrent UTI - does see Dr. Samuel Lira, Urologist  Has been taking Doxycycline 100 mg daily for prophylaxis  Urinary frequency,  burning after micturition , lower abdominal cramping few days. Dr. Lira did call in a Rx but has not picked up.   Reviewed pt last labs. Encouraged tighter control of blood sugar to better protect kidneys  At last appt, started on Metformin 500 mg twice daily. Taking Jardiance 10 mg daily    Blood pressure readings from home. Systolic 126 - 120s most of the time        Denies fever, chills, URI symptoms, chest pain, SOB, palpitations, vomiting, diarrhea, no gross neuro deficits, urinary symptoms and leg swelling.      The following were reviewed: Active problem list, medication list, allergies, family history, social history, and Health Maintenance.     History:  Past Medical History:   Diagnosis Date    Anxiety     Asthma     Colon polyp     Diabetes mellitus, type 2     Diabetic retinopathy     DM (diabetes mellitus) 2008     11/07/2018    Hyperlipidemia     Hypertension     Hypothyroidism     Primary open angle glaucoma of both eyes, mild stage 1/2/2018    Pyelonephritis     Recurrent UTI     Dr. Lira follows    Stage 3a chronic kidney disease 3/9/2022     Past Surgical History:   Procedure Laterality Date    HYSTERECTOMY       Family History   Problem Relation Name Age of Onset    Cancer Mother          pancreatic    Hypertension Mother      Cancer Father          prostate    Stroke Brother      Cataracts Brother      Glaucoma Brother      Lupus Sister       Patient Active Problem List   Diagnosis     Hypertension associated with diabetes    Mild intermittent asthma without complication    Vitamin D deficiency    Anxiety    Recurrent UTI    Controlled type 2 diabetes mellitus with diabetic polyneuropathy, without long-term current use of insulin    Family history of glaucoma    Primary open angle glaucoma of both eyes, mild stage    Hyperlipidemia associated with type 2 diabetes mellitus    Acquired hypothyroidism    History of colon polyps    GERD (gastroesophageal reflux disease)    Bilateral hearing loss    Stage 3b chronic kidney disease    Type 2 diabetes mellitus with stable proliferative retinopathy of both eyes, without long-term current use of insulin     Review of patient's allergies indicates:   Allergen Reactions    Sulfa (sulfonamide antibiotics) Anaphylaxis    Aspirin Other (See Comments)     Stomach pain    Methocarbamol        Medications:  Current Outpatient Medications on File Prior to Visit   Medication Sig Dispense Refill    amLODIPine (NORVASC) 10 MG tablet TAKE 1 TABLET(10 MG) BY MOUTH DAILY 30 tablet 11    aspirin 81 MG Chew Take 1 tablet (81 mg total) by mouth every other day.  0    azelastine (ASTELIN) 137 mcg (0.1 %) nasal spray 2 sprays 2 (two) times daily.      benzonatate (TESSALON) 100 MG capsule Take by mouth.      blood sugar diagnostic (ONETOUCH VERIO TEST STRIPS) Strp CHECK TWICE DAILY 200 strip 6    blood sugar diagnostic Strp Check sugars daily prn 100 strip 3    blood-glucose meter kit To check BG 2 times daily, to use with insurance preferred meter  Dx:  E11.9 1 each 0    brimonidine 0.2% (ALPHAGAN) 0.2 % Drop Place 1 drop into both eyes 2 (two) times a day. 15 mL 3    CALCIUM CARBONATE (CALCIUM 300 ORAL) Take by mouth.      cranberry conc-C-bacillus coag 250-30-50 mg-mg-million Tab Take by mouth.      cyanocobalamin, vitamin B-12, 5,000 mcg Subl Place under the tongue.      doxycycline (MONODOX) 100 MG capsule Take 100 mg by mouth.      empagliflozin (JARDIANCE) 10 mg tablet  Take 1 tablet (10 mg total) by mouth once daily. 30 tablet 11    fluticasone (FLONASE) 50 mcg/actuation nasal spray 2 sprays by Each Nare route once daily. 16 g 6    fluticasone-salmeterol diskus inhaler 250-50 mcg Inhale 1 puff into the lungs 2 (two) times daily. 60 each 11    ipratropium (ATROVENT) 0.03 % nasal spray 2 sprays by Nasal route 2 (two) times daily. 30 mL 4    ketorolac 0.5% (ACULAR) 0.5 % Drop ketorolac 0.5 % eye drops      lancets Misc To check BG 2 times daily, to use with insurance preferred meter  Dx:  E11.9 200 each 3    latanoprost 0.005 % ophthalmic solution INSTILL 1 DROP IN BOTH EYES EVERY EVENING 2.5 mL 2    latanoprost 0.005 % ophthalmic solution Place 1 drop into both eyes once daily. 2.5 mL 3    levocetirizine (XYZAL) 5 MG tablet levocetirizine 5 mg tablet      levothyroxine (SYNTHROID) 25 MCG tablet Take 1 tablet (25 mcg total) by mouth once daily. 90 tablet 1    olmesartan (BENICAR) 40 MG tablet TAKE 1 TABLET(40 MG) BY MOUTH DAILY 30 tablet 11    omeprazole (PRILOSEC) 40 MG capsule Take 1 capsule (40 mg total) by mouth once daily. For acid reflux 90 capsule 3    PHENAZOPYRIDINE HCL (AZO ORAL) Take by mouth.      polyethylene glycol (GLYCOLAX) 17 gram/dose powder MIX 17 GRAMS ACCORDING TO PACKAGE DIRECTIONS AND DRINK ONCE DAILY 510 g 3    rosuvastatin (CRESTOR) 20 MG tablet Take 1 tablet (20 mg total) by mouth once daily. 90 tablet 3    sertraline (ZOLOFT) 50 MG tablet TAKE 1 AND 1/2 TABLETS(75 MG) BY MOUTH EVERY EVENING 135 tablet 3    traZODone (DESYREL) 100 MG tablet Take 1 tablet (100 mg total) by mouth every evening. 30 tablet 11     No current facility-administered medications on file prior to visit.       Medications have been reviewed and reconciled with patient at visit today.      Exam:  Vitals:    01/07/25 1432   BP: 128/64   Pulse: 82   Temp: 98.3 °F (36.8 °C)     Weight: 65.2 kg (143 lb 11.8 oz)   Body mass index is 26.29 kg/m².      BP Readings from Last 3 Encounters:    01/07/25 128/64   11/07/24 132/68   09/12/24 114/70     Wt Readings from Last 3 Encounters:   01/07/25 65.2 kg (143 lb 11.8 oz)   11/07/24 65.9 kg (145 lb 6.3 oz)   09/12/24 65.6 kg (144 lb 11.7 oz)         Physical Exam  Vitals reviewed.   Constitutional:       General: She is not in acute distress.     Appearance: Normal appearance.   HENT:      Head: Normocephalic and atraumatic.      Nose: Nose normal.      Mouth/Throat:      Mouth: Mucous membranes are moist.   Eyes:      General: No scleral icterus.     Conjunctiva/sclera: Conjunctivae normal.   Cardiovascular:      Rate and Rhythm: Normal rate and regular rhythm.      Heart sounds: No murmur heard.  Pulmonary:      Effort: Pulmonary effort is normal. No respiratory distress.      Breath sounds: Normal breath sounds.   Abdominal:      Palpations: Abdomen is soft. There is no mass.      Tenderness: There is no abdominal tenderness. There is no right CVA tenderness or left CVA tenderness.   Musculoskeletal:      Cervical back: Normal range of motion and neck supple.      Right lower leg: No edema.      Left lower leg: No edema.   Lymphadenopathy:      Cervical: No cervical adenopathy.   Skin:     General: Skin is warm and dry.   Neurological:      Mental Status: She is alert and oriented to person, place, and time. Mental status is at baseline.   Psychiatric:         Mood and Affect: Mood normal.         Thought Content: Thought content normal.              Laboratory Reviewed:     Lab Results   Component Value Date    WBC 5.30 06/10/2024    HGB 11.5 (L) 06/10/2024    HCT 35.4 (L) 06/10/2024     06/10/2024    CHOL 158 11/07/2024    TRIG 81 11/07/2024    HDL 64 11/07/2024    ALT 20 06/10/2024    AST 19 06/10/2024     (L) 11/07/2024    K 4.4 11/07/2024     11/07/2024    CREATININE 1.5 (H) 11/07/2024    BUN 25 (H) 11/07/2024    CO2 26 11/07/2024    TSH 2.152 06/10/2024    INR 1.0 09/21/2014    HGBA1C 8.4 (H) 11/07/2024       Screening or  Prevention Patient's value Goal Complete/Controlled?   HgA1C Testing and Control   Lab Results   Component Value Date    HGBA1C 8.4 (H) 11/07/2024      Annually/Less than 8% No   Lipid profile : 11/07/2024 Annually Yes   LDL control Lab Results   Component Value Date    LDLCALC 77.8 11/07/2024    Annually/Less than 100 mg/dl  Yes   Nephropathy screening Lab Results   Component Value Date    LABMICR 11.0 06/10/2024     Lab Results   Component Value Date    PROTEINUA Negative 01/07/2025    Annually Yes   Blood pressure BP Readings from Last 1 Encounters:   01/07/25 128/64    Less than 140/90 Yes   Dilated retinal exam : 09/26/2023 Annually No   Foot exam   Most Recent Foot Exam Date: Not Found Annually Yes       Health Maintenance  Health Maintenance Topics with due status: Not Due       Topic Last Completion Date    TETANUS VACCINE 04/11/2024    Diabetes Urine Screening 06/10/2024    Lipid Panel 11/07/2024     Health Maintenance Due   Topic Date Due    RSV Vaccine (Age 60+ and Pregnant patients) (1 - 1-dose 75+ series) Never done    COVID-19 Vaccine (5 - 2024-25 season) 09/01/2024    Eye Exam  09/26/2024    DEXA Scan  10/26/2024    Hemoglobin A1c  02/07/2025               -Patient's lab results were reviewed and discussed with patient  -Treatment options and alternatives were discussed with the patient. Patient expressed understanding. Patient was given the opportunity to ask questions and be an active participant in their medical care. Patient had no further questions or concerns at this time.         Future Appointments   Date Time Provider Department Center   2/6/2025  8:20 AM Jen Carlton MD [id number]          After visit summary printed and given to patient upon discharge.  Patient goals and care plan are included in After visit summary.      The following issues were discussed: The primary encounter diagnosis was Recurrent UTI. Diagnoses of Hypertension associated with diabetes, Type 2  diabetes mellitus with stable proliferative retinopathy of both eyes, without long-term current use of insulin, Stage 3b chronic kidney disease, and Hyperlipidemia associated with type 2 diabetes mellitus were also pertinent to this visit.    Health maintenance needs, recent test results and goals of care discussed with pt and questions answered.           MALLORIE Wise, NP-C  [facility] [place] [address]

## 2025-01-08 PROBLEM — N17.9 ACUTE KIDNEY INJURY SUPERIMPOSED ON CHRONIC KIDNEY DISEASE: Status: RESOLVED | Noted: 2024-08-15 | Resolved: 2025-01-08

## 2025-01-08 PROBLEM — N18.9 ACUTE KIDNEY INJURY SUPERIMPOSED ON CHRONIC KIDNEY DISEASE: Status: RESOLVED | Noted: 2024-08-15 | Resolved: 2025-01-08

## 2025-01-08 NOTE — ASSESSMENT & PLAN NOTE
Latest Reference Range & Units 06/10/24 10:47 11/07/24 14:27   Hemoglobin A1C External 4.0 - 5.6 % 8.8 (H) 8.4 (H)   Goal < 7  Continue metformin and Jardiance  Lower CHO intake  F/U with Opthalmology

## 2025-01-08 NOTE — ASSESSMENT & PLAN NOTE
Stable at Stage 3 B  Encouraged fluid intake  Control blood pressure and need tighter control on blood sugar  Decrease CHO and sugar intake  Encouraged activity  Continue ARB and Jardiance (SGLT2i)

## 2025-01-08 NOTE — ASSESSMENT & PLAN NOTE
U/A indicates leukocytes and WBC  Obtain Cipro Rx prescribed by Dr. Lira  Phenazopyridine for dysuria  Encouraged increased clear fluid intake  Urine culture pending  F/U with Dr. Lira

## 2025-01-09 ENCOUNTER — TELEPHONE (OUTPATIENT)
Dept: PRIMARY CARE CLINIC | Facility: CLINIC | Age: 80
End: 2025-01-09
Payer: MEDICARE

## 2025-01-09 DIAGNOSIS — N39.0 RECURRENT UTI: Primary | ICD-10-CM

## 2025-01-09 LAB — BACTERIA UR CULT: ABNORMAL

## 2025-01-09 RX ORDER — AMOXICILLIN 875 MG/1
875 TABLET, FILM COATED ORAL EVERY 12 HOURS
Qty: 14 TABLET | Refills: 0 | Status: SHIPPED | OUTPATIENT
Start: 2025-01-09 | End: 2025-01-16

## 2025-01-09 NOTE — TELEPHONE ENCOUNTER
----- Message from  Sharon sent at 1/9/2025 12:01 PM CST -----  Regarding: Returned Call  Pt states she was returning a call. Pt can be reached at [phone] - vl

## 2025-01-24 NOTE — PROGRESS NOTES
"Subjective:      Patient ID: Natalia Mckeon is a 80 y.o. female.    Chief Complaint: Follow-up and Medication Refill      HPI  Here for follow up of medical problems.  On jardiance 10mg, off januvia, restarted metformin 500mg BID x 1.5 months.  Anxiety good on rx.  Walking for exercise, and aerobic twice a week.  No f/c/sw/cough.  BMs normal.  Not taking advair lately, breathin well.            Advance Care Planning     Date: 02/06/2025    ACP Reviewed/No Changes  Voluntary advance care planning discussion had today with patient. Previously completed HCPOA in electronic medical record is current, no changes made.      A total of 5 min was spent on advance care planning, goals of care discussion, emotional support, formulating and communicating prognosis and exploring burden/benefit of various approaches of treatment. This discussion occurred on a fully voluntary basis with the verbal consent of the patient and/or family.         Updated, annual due 6/25:  HM: 9/24 fluvax, 1/21 covid vaccines/booster, 4/24 HAV, 7/15 [id number], 2/18 booster [id number], 7/20 [id number], 4/24 Tdap, 10/14 zostervax at pharmacy, 2019 Shingrix x2, 12/24 [facility]/Gyn Dr. Obrien, bmd per her, 3/24 Cscope Dr. Auguste rep 5y, 9/24 Eye Dr. Phillip, 11/16 HCV neg, Cards Dr. Jm Vasquez, Urol Dr. Lira.     Review of Systems   Constitutional:  Negative for chills, diaphoresis and fever.   Respiratory:  Negative for cough and shortness of breath.    Cardiovascular:  Negative for chest pain, palpitations and leg swelling.   Gastrointestinal:  Negative for blood in stool, constipation, diarrhea, nausea and vomiting.   Genitourinary:  Negative for dysuria, frequency and hematuria.   Psychiatric/Behavioral:  The patient is not nervous/anxious.          Objective:   /60 (BP Location: Right arm, Patient Position: Sitting)   Pulse 85   Temp 97.5 °F (36.4 °C) (Skin)   Ht 5' 2" (1.575 m)   Wt 64.4 kg (141 lb 13.9 oz)   SpO2 97%   BMI 25.95 kg/m² "     Physical Exam  Constitutional:       Appearance: She is well-developed.   Neck:      Thyroid: No thyroid mass.      Vascular: No carotid bruit.   Cardiovascular:      Rate and Rhythm: Normal rate and regular rhythm.      Heart sounds: No murmur heard.     No friction rub. No gallop.   Pulmonary:      Effort: Pulmonary effort is normal.      Breath sounds: Normal breath sounds. No wheezing or rales.   Abdominal:      General: Bowel sounds are normal.      Palpations: Abdomen is soft. There is no mass.      Tenderness: There is no abdominal tenderness.   Musculoskeletal:      Cervical back: Neck supple.   Lymphadenopathy:      Cervical: No cervical adenopathy.   Neurological:      Mental Status: She is alert and oriented to person, place, and time.             Assessment:       1. Controlled type 2 diabetes mellitus with diabetic polyneuropathy, without long-term current use of insulin    2. Hypertension associated with diabetes    3. Hyperlipidemia associated with type 2 diabetes mellitus    4. Acquired hypothyroidism    5. Anxiety    6. Stage 3b chronic kidney disease    7. Type 2 diabetes mellitus with stable proliferative retinopathy of both eyes, without long-term current use of insulin    8. Mild intermittent asthma without complication          Plan:     1. Controlled type 2 diabetes mellitus with diabetic polyneuropathy, with stable proliferative retinopathy of both eyes, without long-term current use of insulin- recheck labs now.  Overview:  Januvia 50mg daily,  Jardiance 10mg daily.    Orders:  -     Basic Metabolic Panel; Future; Expected date: 02/06/2025  -     Hemoglobin A1C; Future; Expected date: 02/06/2025  -     metFORMIN (GLUCOPHAGE) 500 MG tablet; Take 1 tablet (500 mg total) by mouth 2 (two) times daily with meals.  Dispense: 180 tablet; Refill: 3    2. Hypertension associated with diabetes- stable, cont rxs.  Overview:  Benicar 40mg daily,  Amlodipine 10mg daily.    3. Hyperlipidemia  associated with type 2 diabetes mellitus- last LDL 77, will recheck   Overview:  Rosuva 20mg daily.    4. Acquired hypothyroidism- Clinically stable, continue present treatment.  Overview:  Synthroid 25mcg daily.    5. Anxiety- doing well, cont rx.  Overview:  Sertraline 50mg daily.    6. Stage 3b chronic kidney disease- recheck now.  -     Basic Metabolic Panel; Future; Expected date: 02/06/2025    7. Type 2 diabetes mellitus  without long-term current use of insulin    8. Mild intermittent asthma without complication- doing well, off rx now.  Overview:  Advair 250-50, 1p BID.    RTC  1mo.

## 2025-02-06 ENCOUNTER — OFFICE VISIT (OUTPATIENT)
Dept: PRIMARY CARE CLINIC | Facility: CLINIC | Age: 80
End: 2025-02-06
Payer: MEDICARE

## 2025-02-06 VITALS
OXYGEN SATURATION: 97 % | HEART RATE: 85 BPM | HEIGHT: 62 IN | SYSTOLIC BLOOD PRESSURE: 118 MMHG | BODY MASS INDEX: 26.11 KG/M2 | WEIGHT: 141.88 LBS | TEMPERATURE: 98 F | DIASTOLIC BLOOD PRESSURE: 60 MMHG

## 2025-02-06 DIAGNOSIS — E11.69 HYPERLIPIDEMIA ASSOCIATED WITH TYPE 2 DIABETES MELLITUS: ICD-10-CM

## 2025-02-06 DIAGNOSIS — E78.5 HYPERLIPIDEMIA ASSOCIATED WITH TYPE 2 DIABETES MELLITUS: ICD-10-CM

## 2025-02-06 DIAGNOSIS — N18.32 STAGE 3B CHRONIC KIDNEY DISEASE: ICD-10-CM

## 2025-02-06 DIAGNOSIS — F41.9 ANXIETY: ICD-10-CM

## 2025-02-06 DIAGNOSIS — E11.59 HYPERTENSION ASSOCIATED WITH DIABETES: ICD-10-CM

## 2025-02-06 DIAGNOSIS — E11.42 CONTROLLED TYPE 2 DIABETES MELLITUS WITH DIABETIC POLYNEUROPATHY, WITHOUT LONG-TERM CURRENT USE OF INSULIN: Primary | ICD-10-CM

## 2025-02-06 DIAGNOSIS — E03.9 ACQUIRED HYPOTHYROIDISM: ICD-10-CM

## 2025-02-06 DIAGNOSIS — J45.20 MILD INTERMITTENT ASTHMA WITHOUT COMPLICATION: ICD-10-CM

## 2025-02-06 DIAGNOSIS — E11.3553 TYPE 2 DIABETES MELLITUS WITH STABLE PROLIFERATIVE RETINOPATHY OF BOTH EYES, WITHOUT LONG-TERM CURRENT USE OF INSULIN: ICD-10-CM

## 2025-02-06 DIAGNOSIS — I15.2 HYPERTENSION ASSOCIATED WITH DIABETES: ICD-10-CM

## 2025-02-06 PROCEDURE — 1126F AMNT PAIN NOTED NONE PRSNT: CPT | Mod: CPTII,S$GLB,, | Performed by: INTERNAL MEDICINE

## 2025-02-06 PROCEDURE — 1123F ACP DISCUSS/DSCN MKR DOCD: CPT | Mod: CPTII,S$GLB,, | Performed by: INTERNAL MEDICINE

## 2025-02-06 PROCEDURE — 3074F SYST BP LT 130 MM HG: CPT | Mod: CPTII,S$GLB,, | Performed by: INTERNAL MEDICINE

## 2025-02-06 PROCEDURE — 1101F PT FALLS ASSESS-DOCD LE1/YR: CPT | Mod: CPTII,S$GLB,, | Performed by: INTERNAL MEDICINE

## 2025-02-06 PROCEDURE — 3288F FALL RISK ASSESSMENT DOCD: CPT | Mod: CPTII,S$GLB,, | Performed by: INTERNAL MEDICINE

## 2025-02-06 PROCEDURE — 1159F MED LIST DOCD IN RCRD: CPT | Mod: CPTII,S$GLB,, | Performed by: INTERNAL MEDICINE

## 2025-02-06 PROCEDURE — 99999 PR PBB SHADOW E&M-EST. PATIENT-LVL III: CPT | Mod: PBBFAC,,, | Performed by: INTERNAL MEDICINE

## 2025-02-06 PROCEDURE — 3078F DIAST BP <80 MM HG: CPT | Mod: CPTII,S$GLB,, | Performed by: INTERNAL MEDICINE

## 2025-02-06 PROCEDURE — 99214 OFFICE O/P EST MOD 30 MIN: CPT | Mod: S$GLB,,, | Performed by: INTERNAL MEDICINE

## 2025-02-06 RX ORDER — METFORMIN HYDROCHLORIDE 500 MG/1
500 TABLET ORAL 2 TIMES DAILY WITH MEALS
Qty: 180 TABLET | Refills: 3 | Status: SHIPPED | OUTPATIENT
Start: 2025-02-06 | End: 2026-02-06

## 2025-02-06 NOTE — PROGRESS NOTES
Pt had labs drawn, tolerated well, no pain , 1 stick     Topical Sulfur Applications Pregnancy And Lactation Text: This medication is considered safe during pregnancy and breast feeding secondary to limited systemic absorption.

## 2025-02-24 DIAGNOSIS — Z00.00 ENCOUNTER FOR MEDICARE ANNUAL WELLNESS EXAM: ICD-10-CM

## 2025-03-06 ENCOUNTER — RESULTS FOLLOW-UP (OUTPATIENT)
Dept: PRIMARY CARE CLINIC | Facility: CLINIC | Age: 80
End: 2025-03-06

## 2025-03-06 ENCOUNTER — APPOINTMENT (OUTPATIENT)
Dept: RADIOLOGY | Facility: HOSPITAL | Age: 80
End: 2025-03-06
Attending: FAMILY MEDICINE
Payer: MEDICARE

## 2025-03-06 ENCOUNTER — OFFICE VISIT (OUTPATIENT)
Dept: PRIMARY CARE CLINIC | Facility: CLINIC | Age: 80
End: 2025-03-06
Payer: MEDICARE

## 2025-03-06 VITALS
SYSTOLIC BLOOD PRESSURE: 134 MMHG | BODY MASS INDEX: 25.8 KG/M2 | TEMPERATURE: 99 F | HEART RATE: 77 BPM | RESPIRATION RATE: 18 BRPM | OXYGEN SATURATION: 96 % | WEIGHT: 140.19 LBS | DIASTOLIC BLOOD PRESSURE: 74 MMHG | HEIGHT: 62 IN

## 2025-03-06 DIAGNOSIS — N18.32 STAGE 3B CHRONIC KIDNEY DISEASE: ICD-10-CM

## 2025-03-06 DIAGNOSIS — Z13.820 ENCOUNTER FOR OSTEOPOROSIS SCREENING IN ASYMPTOMATIC POSTMENOPAUSAL PATIENT: ICD-10-CM

## 2025-03-06 DIAGNOSIS — Z78.0 ENCOUNTER FOR OSTEOPOROSIS SCREENING IN ASYMPTOMATIC POSTMENOPAUSAL PATIENT: ICD-10-CM

## 2025-03-06 DIAGNOSIS — E78.5 HYPERLIPIDEMIA ASSOCIATED WITH TYPE 2 DIABETES MELLITUS: ICD-10-CM

## 2025-03-06 DIAGNOSIS — G47.00 INSOMNIA, UNSPECIFIED TYPE: ICD-10-CM

## 2025-03-06 DIAGNOSIS — N39.0 RECURRENT UTI: ICD-10-CM

## 2025-03-06 DIAGNOSIS — E11.69 HYPERLIPIDEMIA ASSOCIATED WITH TYPE 2 DIABETES MELLITUS: ICD-10-CM

## 2025-03-06 DIAGNOSIS — E11.42 CONTROLLED TYPE 2 DIABETES MELLITUS WITH DIABETIC POLYNEUROPATHY, WITHOUT LONG-TERM CURRENT USE OF INSULIN: ICD-10-CM

## 2025-03-06 DIAGNOSIS — E11.8 DM (DIABETES MELLITUS), TYPE 2 WITH COMPLICATIONS: Primary | ICD-10-CM

## 2025-03-06 DIAGNOSIS — K59.00 CONSTIPATION, UNSPECIFIED CONSTIPATION TYPE: ICD-10-CM

## 2025-03-06 DIAGNOSIS — E03.9 ACQUIRED HYPOTHYROIDISM: ICD-10-CM

## 2025-03-06 DIAGNOSIS — J45.20 MILD INTERMITTENT ASTHMA WITHOUT COMPLICATION: ICD-10-CM

## 2025-03-06 DIAGNOSIS — E11.59 HYPERTENSION ASSOCIATED WITH DIABETES: ICD-10-CM

## 2025-03-06 DIAGNOSIS — I15.2 HYPERTENSION ASSOCIATED WITH DIABETES: ICD-10-CM

## 2025-03-06 PROCEDURE — 99999 PR PBB SHADOW E&M-EST. PATIENT-LVL III: CPT | Mod: PBBFAC,,, | Performed by: FAMILY MEDICINE

## 2025-03-06 PROCEDURE — 77080 DXA BONE DENSITY AXIAL: CPT | Mod: 26,,, | Performed by: RADIOLOGY

## 2025-03-06 PROCEDURE — 77080 DXA BONE DENSITY AXIAL: CPT | Mod: TC

## 2025-03-06 RX ORDER — POLYETHYLENE GLYCOL 3350 17 G/17G
17 POWDER, FOR SOLUTION ORAL DAILY
COMMUNITY
Start: 2025-03-06

## 2025-03-06 RX ORDER — OLMESARTAN MEDOXOMIL / AMLODIPINE BESYLATE / HYDROCHLOROTHIAZIDE 40; 10; 12.5 MG/1; MG/1; MG/1
1 TABLET, FILM COATED ORAL DAILY
Qty: 360 TABLET | Refills: 3 | Status: SHIPPED | OUTPATIENT
Start: 2025-03-06 | End: 2026-03-06

## 2025-03-06 RX ORDER — LANOLIN ALCOHOL/MO/W.PET/CERES
1000 CREAM (GRAM) TOPICAL DAILY
COMMUNITY
Start: 2025-03-06

## 2025-03-06 NOTE — ASSESSMENT & PLAN NOTE
Monitored the patient's hemoglobin A1c levels over time, noting fluctuations: 7.5 in January, 8.8 in June last year, and 8.4 in November (about 4 months ago).  - Observed a decrease in average glucose levels from 206 to 194 after initiating Jardiance.  - Evaluated the patient's current hemoglobin A1c at 8.8, which is above the target goal of 7.  - Assessed that the patient's blood sugar remains elevated and requires further reduction.  - Increased Jardiance dosage from 10mg to 25mg daily to improve diabetes control.  - Continued Metformin for diabetes management.  - Emphasized the importance of Jardiance for diabetes management, kidney protection, and cardioprotective effects.  - Discussed the relationship between Jardiance dosage and effectiveness in controlling blood sugar.  - Emphasized the importance of adequate fluid intake when taking Jardiance.  - Ordered a complete metabolic panel (CMP) and Hemoglobin A1c (HbA1c) for the 2-month follow-up visit to assess kidney and liver function, and evaluate diabetes control.  - Plan to reassess the patient's response to the increased Jardiance dose in approximately 2 months.  - Continued long-term use of Metformin for diabetes management.  - Prescribed Jardiance 25mg for long-term diabetes management.  - Natalia to continue walking exercise routine.

## 2025-03-06 NOTE — ASSESSMENT & PLAN NOTE
Discussed importance of bone density testing for osteoporosis risk assessment.  - DEXA scan ordered for bone density assessment.

## 2025-03-06 NOTE — ASSESSMENT & PLAN NOTE
Continued Doxycycline for UTI prevention.  - Ordered urinalysis for the 2-month follow-up visit to check for proteinuria.  - Discontinued AZO supplement as the patient is already on Doxycycline for UTI prevention.  - Noted that the patient reports recurrent bladder infections despite preventive measures.  - Continued long-term Doxycycline treatment for urinary tract infection prevention. By Urologist

## 2025-03-06 NOTE — ASSESSMENT & PLAN NOTE
Patient is not taking trazodone as given.  Noted that the patient occasionally takes melatonin for sleep.

## 2025-03-06 NOTE — ASSESSMENT & PLAN NOTE
Latest Reference Range & Units Most Recent 10/25/23 12:06 01/08/24 11:44 06/10/24 10:47 07/17/24 14:58 08/13/24 09:14 11/07/24 14:27   eGFR >60 mL/min/1.73 m^2 35.2 !  11/7/24 14:27 42.1 ! 42.1 ! 28.3 ! 30.3 ! 28.3 ! 35.2 !   !: Data is abnormal  Increased the dosage of Jardiance from 10 mg to 25 mg

## 2025-03-06 NOTE — ASSESSMENT & PLAN NOTE
Prescribed 1 puff of inhaler daily to prevent cough, especially at night.  - Noted that the patient reports having a cough, which may be a manifestation of asthma.

## 2025-03-06 NOTE — ASSESSMENT & PLAN NOTE
Continued Tribenzor (40mg Olmesartan/5mg Amlodipine/12.5mg Hydrochlorothiazide) 1 tablet daily.  - Recommend taking Tribenzor in the morning instead of the evening.  - Explained the role of Olmesartan in retaining potassium, negating the need for supplementation.  - Noted that the patient's blood pressure is reported to be well-controlled.

## 2025-03-06 NOTE — PROGRESS NOTES
Primary Care Provider Appointment   [facility]      Patient Care Team:  Jen Carlton MD as PCP - General (Internal Medicine)  ROSE Chairez MD as Consulting Physician (Ophthalmology)  Ricky Resendez MD as Consulting Physician (Gastroenterology)  Mesah Obrien MD (Obstetrics and Gynecology)  Jaspreet Phillip MD as Consulting Physician (Ophthalmology)  Jl Su MD as Consulting Physician (Otolaryngology)  Naveen, Samuel Monroy MD as Consulting Physician (Family Medicine)  [facility]  Adriana Salazar RN as     Patient ID: Natalia Mckeon is a 80 y.o. female.      Subjective:     Chief Complaint   Patient presents with    Follow-up     I have reviewed the information entered by the ancillary staff regarding the chief complaint as well as the related history.        Health Maintenance         Date Due Completion Date    RSV Vaccine (Age 60+ and Pregnant patients) (1 - 1-dose 75+ series) Never done ---    COVID-19 Vaccine (5 - 2024-25 season) 09/01/2024 6/28/2022    Diabetic Eye Exam 09/26/2024 9/26/2023    Override on 9/26/2023: Done    Override on 3/1/2022: Done    Override on 11/20/2019: Done    Override on 12/19/2018: Done    Override on 7/20/2015: Done    Override on 7/20/2015: Done    DEXA Scan 10/26/2024 10/26/2022    Hemoglobin A1c 02/07/2025 11/7/2024    Diabetes Urine Screening 06/10/2025 6/10/2024    Lipid Panel 11/07/2025 11/7/2024    TETANUS VACCINE 04/11/2034 4/11/2024             History of Present Illness    CHIEF COMPLAINT:  Natalia presents today for follow up of diabetes management    DIABETES:  She currently takes metformin for diabetes management. She discontinued Jardiance  several mos ago due to dizziness, but still continued to have the dizziness occasionally. Hemoglobin A1c has increased from 7.5 to 8.8.    MEDICAL HISTORY:  She has a history of urinary tract infections  "requiring prophylactic antibiotics. She currently takes doxycycline as preventive measure.    MEDICATIONS:  Her current medications include Tribenzor (Olmesartan 40/Amlodipine 10/Hydrochlorothiazide 12.5), low-dose aspirin, levothyroxine for thyroid condition, and Crestor for cholesterol management.    RESPIRATORY:  She reports nighttime cough, possibly related to asthma. She has been prescribed an inhaler but is not using it regularly.    EXERCISE:  She maintains a daily walking routine, completing seven laps which equals one mile.    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 She denies frequent use of nasal sprays including Azelastine.          For complete problem list, past medical history, surgical history, social history, etc., see appropriate section in the electronic medical record    Review of Systems      Constitutional: Negative Fever,chills, weight changes   HENT: Negative.     Eyes: Neg vision issues, eye pain   Respiratory: +occasional  cough, no SOB, wheezing.    Cardiovascular:  Neg  chest pain, palpitations, orthopnea and LE edema   Gastrointestinal:  Neg  abdo pain, Con/Samina/N/V.   Musculoskeletal: Neg Joint pains, myalgia  Skin: Neg itching, rashes, discoloration  Neurol:  Neg LOC/dizz/ sen & motor changes, H/A  Endo/Heme/Allergies: Negative.    Psychiatric/Behavioral:  Neg deprd/anx/ memory loss    Objective     Vitals:    03/06/25 1041   BP: 134/74   Pulse: 77   Resp: 18   Temp: 98.5 °F (36.9 °C)   TempSrc: Oral   SpO2: 96%   Weight: 63.6 kg (140 lb 3.4 oz)   Height: 5' 2" (1.575 m)         Constitutional:  Not in acute distress, alert and oriented x3  HENT: no swollen glands in the neck, no thyromegaly  Eyes: EOMI, conjunctiva within normal limits, PERRLA  Cardiovascular:  Rate and rhythm within normal limits, normal pulses  Pulmonary:  Normal breath sounds, and normal pulmonary efforts w/o wheezing or rales  Abdominal:  Normal bowel " sounds, no abdominal pain     Musculoskeletal:  Adequate range of motion of the spine and the joints  Skin:  Skin is warm and dry.   Neurological:  No sensory or motor deficits.   Psychiatric:  Mood and affect within normal limits, behavior appropriate, normal thought process      Physical Exam    RECENT LABS:    Lab Results   Component Value Date    WBC 5.30 06/10/2024    HGB 11.5 (L) 06/10/2024    HCT 35.4 (L) 06/10/2024     06/10/2024    CHOL 158 11/07/2024    TRIG 81 11/07/2024    HDL 64 11/07/2024    ALT 20 06/10/2024    AST 19 06/10/2024     (L) 11/07/2024    K 4.4 11/07/2024     11/07/2024    CREATININE 1.5 (H) 11/07/2024    BUN 25 (H) 11/07/2024    CO2 26 11/07/2024    TSH 2.152 06/10/2024    INR 1.0 09/21/2014    HGBA1C 8.4 (H) 11/07/2024       Results for orders placed or performed in visit on 01/07/25   Urine culture    Collection Time: 01/07/25  3:21 PM    Specimen: Urine   Result Value Ref Range    Urine Culture, Routine (A)      STREPTOCOCCUS AGALACTIAE (GROUP B)  10,000 - 49,999 cfu/ml  In case of Penicillin allergy, call lab for further testing.  Beta-hemolytic streptococci are routinely susceptible to   penicillins,cephalosporins and carbapenems.  No other significant isolate     Urinalysis, Reflex to Urine Culture Urine, Clean Catch    Collection Time: 01/07/25  3:21 PM    Specimen: Urine   Result Value Ref Range    Specimen UA Urine, Clean Catch     Color, UA Yellow Yellow, Straw, Elizabeth    Appearance, UA Clear Clear    pH, UA 6.0 5.0 - 8.0    Specific Gravity, UA 1.015 1.005 - 1.030    Protein, UA Negative Negative    Glucose, UA 4+ (A) Negative    Ketones, UA Negative Negative    Bilirubin (UA) Negative Negative    Occult Blood UA Negative Negative    Nitrite, UA Negative Negative    Leukocytes, UA 2+ (A) Negative   Urinalysis Microscopic    Collection Time: 01/07/25  3:21 PM   Result Value Ref Range    RBC, UA 1 0 - 4 /hpf    WBC, UA >100 (H) 0 - 5 /hpf    WBC Clumps, UA Rare  None-Rare    Bacteria Occasional None-Occ /hpf    Yeast, UA None None    Squam Epithel, UA 0 /hpf    Microscopic Comment SEE COMMENT        ASSESSMENT/PLAN by Problem List:    Follow Up:  2 mos        Worry score 2    Advance Care Planning     Date: 03/06/2025     Assessment & Plan    IMPRESSION:  - Reviewed patient's diabetes management and kidney function  - Assessed effectiveness of Jardiance in lowering A1c and improving kidney function  - Considered increasing Jardiance dose from 10mg to 25mg to better control blood sugar and protect kidneys  - Evaluated necessity of various medications and supplements  - Recommend discontinuing AZO supplement due to concurrent use of doxycycline for UTI prevention  - Advised against potassium supplementation due to potassium-sparing effect of Olmesartan  - Considered need for bone density scan to assess osteoporosis risk     1. DM (diabetes mellitus), type 2 with complications  -     HEMOGLOBIN A1C; Future; Expected date: 03/06/2025  -     Comprehensive Metabolic Panel; Future; Expected date: 03/06/2025    2. Controlled type 2 diabetes mellitus with diabetic polyneuropathy, without long-term current use of insulin  Overview:  Metformin 500mg BID,  Jardiance 10mg daily.  .    Assessment & Plan:   Monitored the patient's hemoglobin A1c levels over time, noting fluctuations: 7.5 in January, 8.8 in June last year, and 8.4 in November (about 4 months ago).  - Observed a decrease in average glucose levels from 206 to 194 after initiating Jardiance.  - Evaluated the patient's current hemoglobin A1c at 8.8, which is above the target goal of 7.  - Assessed that the patient's blood sugar remains elevated and requires further reduction.  - Increased Jardiance dosage from 10mg to 25mg daily to improve diabetes control.  - Continued Metformin for diabetes management.  - Emphasized the importance of Jardiance for diabetes management, kidney protection, and cardioprotective effects.  -  Discussed the relationship between Jardiance dosage and effectiveness in controlling blood sugar.  - Emphasized the importance of adequate fluid intake when taking Jardiance.  - Ordered a complete metabolic panel (CMP) and Hemoglobin A1c (HbA1c) for the 2-month follow-up visit to assess kidney and liver function, and evaluate diabetes control.  - Plan to reassess the patient's response to the increased Jardiance dose in approximately 2 months.  - Continued long-term use of Metformin for diabetes management.  - Prescribed Jardiance 25mg for long-term diabetes management.  - Natalia to continue walking exercise routine.        3. Hypertension associated with diabetes  Assessment & Plan:  Continued Tribenzor (40mg Olmesartan/5mg Amlodipine/12.5mg Hydrochlorothiazide) 1 tablet daily.  - Recommend taking Tribenzor in the morning instead of the evening.  - Explained the role of Olmesartan in retaining potassium, negating the need for supplementation.  - Noted that the patient's blood pressure is reported to be well-controlled.      Orders:  -     Microalbumin/Creatinine Ratio, Urine; Future; Expected date: 03/06/2025    4. Encounter for osteoporosis screening in asymptomatic postmenopausal patient  Assessment & Plan:  Discussed importance of bone density testing for osteoporosis risk assessment.  - DEXA scan ordered for bone density assessment.    Orders:  -     DXA Bone Density Axial Skeleton 1 or more sites; Future; Expected date: 03/06/2025    5. Acquired hypothyroidism  Overview:  Synthroid 25mcg daily.    Assessment & Plan:   Continued Levothyroxine for thyroid management.      6. Hyperlipidemia associated with type 2 diabetes mellitus  Overview:  Rosuva 20mg daily.    Assessment & Plan:  Continued Rosuvastatin (Crestor) for cholesterol management.        7. Recurrent UTI  Assessment & Plan:  Continued Doxycycline for UTI prevention.  - Ordered urinalysis for the 2-month follow-up visit to check for proteinuria.  -  Discontinued AZO supplement as the patient is already on Doxycycline for UTI prevention.  - Noted that the patient reports recurrent bladder infections despite preventive measures.  - Continued long-term Doxycycline treatment for urinary tract infection prevention. By Urologist      8. Stage 3b chronic kidney disease  Assessment & Plan:   Latest Reference Range & Units Most Recent 10/25/23 12:06 01/08/24 11:44 06/10/24 10:47 07/17/24 14:58 08/13/24 09:14 11/07/24 14:27   eGFR >60 mL/min/1.73 m^2 35.2 !  11/7/24 14:27 42.1 ! 42.1 ! 28.3 ! 30.3 ! 28.3 ! 35.2 !   !: Data is abnormal  Increased the dosage of Jardiance from 10 mg to 25 mg      9. Mild intermittent asthma without complication  Overview:  Advair 250-50, 1p BID.    Assessment & Plan:  Prescribed 1 puff of inhaler daily to prevent cough, especially at night.  - Noted that the patient reports having a cough, which may be a manifestation of asthma.      10. Insomnia, unspecified type  Assessment & Plan:  Patient is not taking trazodone as given.  Noted that the patient occasionally takes melatonin for sleep.        11. Constipation, unspecified constipation type    Other orders  -     olmesartan-amLODIPin-hcthiazid (TRIBENZOR) 40-10-12.5 mg Tab; Take 1 tablet by mouth once daily.  Dispense: 360 tablet; Refill: 3  -     polyethylene glycol (GLYCOLAX) 17 gram/dose powder; Take 17 g by mouth once daily.  -     cyanocobalamin (VITAMIN B-12) 1000 MCG tablet; Take 1 tablet (1,000 mcg total) by mouth once daily.  -     empagliflozin (JARDIANCE) 25 mg tablet; Take 1 tablet (25 mg total) by mouth once daily.  Dispense: 90 tablet; Refill: 3       MEDICATIONS/SUPPLEMENTS:  - Continued Aspirin (low dose).  - Continued Omeprazole.  - Continued B12 supplement.  - Continued B complex supplement.    FOLLOW UP:  - Follow up in 2 months.  - Complete ordered lab work prior to next visit.              TOTAL TIME evaluating and managing this patient for this encounter was     minutes.       This time was spent personally by me on some of the following activities: review of patient's past medical history, assessing age-appropriate health maintenance needs, review of any interval history, review and interpretation of lab results, review and interpretation of imaging test results, review and interpretation of cardiology test results, reviewing consulting specialist notes, obtaining history from the patient and family, examination of the patient, medication reconciliation, managing and/or ordering prescription medications, ordering imaging tests, ordering referral to subspecialty provider(s), educating patient and answering their questions about diagnosis, treatment plan, and goals of treatment, discussing planned follow-up and final documentation of the visit. This time was exclusive of any separately billable procedures for this patient and exclusive of time spent treating any other patients.      THIS DOCUMENT WAS MADE IN PART WITH VOICE RECOGNITION SOFTWARE.  OCCASIONALLY THIS SOFTWARE WILL MISINTERPRET WORDS OR PHRASES.

## 2025-03-06 NOTE — PATIENT INSTRUCTIONS
Take your routine medications as prescribed and if any side effects occur from any of the given medications, please call us. If you are given any new medications, make sure to read the side effects to be aware of the possible side effects.  If your current problems and the symptoms get worse, contact our clinic.  If you have scheduled visits with your primary care office or the specialists, please follow up of  with them as scheduled.     Blood work prior to next visit

## 2025-03-07 ENCOUNTER — TELEPHONE (OUTPATIENT)
Dept: PRIMARY CARE CLINIC | Facility: CLINIC | Age: 80
End: 2025-03-07
Payer: MEDICARE

## 2025-03-07 NOTE — TELEPHONE ENCOUNTER
Staff was unable to reach pt at this time to discuss bone density scan results; A vm was left to contact at earliest.    ----- Message from Tommie Wharton MD sent at 3/6/2025  2:33 PM CST -----  Please notify Ms. Mckeon that her Bone density test is normal. Thanks!  ----- Message -----  From: Jarred, Rad Results In  Sent: 3/6/2025   2:20 PM CST  To: Tommie Wharton MD

## 2025-04-01 ENCOUNTER — OFFICE VISIT (OUTPATIENT)
Dept: OPHTHALMOLOGY | Facility: CLINIC | Age: 80
End: 2025-04-01
Payer: MEDICARE

## 2025-04-01 DIAGNOSIS — E11.36 DIABETIC CATARACT OF BOTH EYES: ICD-10-CM

## 2025-04-01 DIAGNOSIS — Z91.199 NON-COMPLIANCE: ICD-10-CM

## 2025-04-01 DIAGNOSIS — H40.1131 PRIMARY OPEN ANGLE GLAUCOMA OF BOTH EYES, MILD STAGE: ICD-10-CM

## 2025-04-01 DIAGNOSIS — E11.9 TYPE 2 DIABETES MELLITUS WITHOUT COMPLICATION, WITHOUT LONG-TERM CURRENT USE OF INSULIN: Primary | ICD-10-CM

## 2025-04-01 PROCEDURE — 99999 PR PBB SHADOW E&M-EST. PATIENT-LVL III: CPT | Mod: PBBFAC,,, | Performed by: OPHTHALMOLOGY

## 2025-04-01 RX ORDER — BRIMONIDINE TARTRATE 2 MG/ML
1 SOLUTION/ DROPS OPHTHALMIC 2 TIMES DAILY
Qty: 15 ML | Refills: 3 | Status: SHIPPED | OUTPATIENT
Start: 2025-04-01 | End: 2026-04-01

## 2025-04-01 NOTE — PROGRESS NOTES
===============================  Date today is 4/1/2025  Natalia Mckeon is a 80 y.o. female  Last visit [facility]: :3/1/2022   Last visit eye dept. Visit date not found    Uncorrected distance visual acuity was 20/70 in the right eye and 20/40 in the left eye.  Tonometry       Tonometry ([facility], 9:37 AM)         Right Left    Pressure 25 26              Target Pressure         Right Left    Target 18 18                  Not recorded       Not recorded       Not recorded       Chief Complaint   Patient presents with    Annual Exam     HPI    Pt here for annual. No pain or irritation. Va stable.      DM SINCE 2006  BDR  H/o DME OD  VMA  COAG (NEWLY DX 3/2018)  +FAMHX-GLAUCOMA-BROTHER      Latanoprost caused redness  Timolol caused redness        Brimonidine BID OU     Last edited by Michelle Kaiser on 4/1/2025  9:40 AM.      Problem List Items Addressed This Visit          Eye/Vision problems    Primary open angle glaucoma of both eyes, mild stage    Relevant Medications    brimonidine 0.2% (ALPHAGAN) 0.2 % Drop    Type 2 diabetes mellitus with stable proliferative retinopathy of both eyes, without long-term current use of insulin - Primary     Other Visit Diagnoses         Diabetic cataract of both eyes          Non-compliance              Instructed to call 24/7 for any worsening of vision, visual distortion or pain.  Check OU independently daily.    Gave my office and personal cell phone number.  ________________  4/1/2025 today  Natalia Mckeon      :DM no retinopathy  OCT looks great  1+ cortical/vacuole cataract OU  Lost to f/u with COAG  No drops for some time  IOP up today  Resume care with Dr. Phillip with 24-2 VF  Resume Brimonidine BID OU  OD sharp disc 0.35  Macula looks great   No macular degeneration  OS c/d 0.4  Midzone ischemic ?[place]       RTC with Dr. Phillip in 6-8 weeks with 24-2 VF and IOP check on drops  Instructed to call 24/7 for any worsening of vision or symptoms. Check OU daily.   Gave my office  and cell phone number.      =============================

## 2025-04-07 ENCOUNTER — OFFICE VISIT (OUTPATIENT)
Dept: INTERNAL MEDICINE | Facility: CLINIC | Age: 80
End: 2025-04-07
Payer: MEDICARE

## 2025-04-07 VITALS
WEIGHT: 140.63 LBS | HEIGHT: 62 IN | BODY MASS INDEX: 25.88 KG/M2 | HEART RATE: 78 BPM | SYSTOLIC BLOOD PRESSURE: 142 MMHG | RESPIRATION RATE: 18 BRPM | DIASTOLIC BLOOD PRESSURE: 86 MMHG | TEMPERATURE: 97 F

## 2025-04-07 DIAGNOSIS — E03.9 ACQUIRED HYPOTHYROIDISM: ICD-10-CM

## 2025-04-07 DIAGNOSIS — J45.20 MILD INTERMITTENT ASTHMA WITHOUT COMPLICATION: ICD-10-CM

## 2025-04-07 DIAGNOSIS — E55.9 VITAMIN D DEFICIENCY: ICD-10-CM

## 2025-04-07 DIAGNOSIS — E78.5 HYPERLIPIDEMIA ASSOCIATED WITH TYPE 2 DIABETES MELLITUS: ICD-10-CM

## 2025-04-07 DIAGNOSIS — E11.3553 TYPE 2 DIABETES MELLITUS WITH STABLE PROLIFERATIVE RETINOPATHY OF BOTH EYES, WITHOUT LONG-TERM CURRENT USE OF INSULIN: ICD-10-CM

## 2025-04-07 DIAGNOSIS — M81.0 OSTEOPOROSIS, UNSPECIFIED OSTEOPOROSIS TYPE, UNSPECIFIED PATHOLOGICAL FRACTURE PRESENCE: ICD-10-CM

## 2025-04-07 DIAGNOSIS — F41.9 ANXIETY: ICD-10-CM

## 2025-04-07 DIAGNOSIS — E11.69 HYPERLIPIDEMIA ASSOCIATED WITH TYPE 2 DIABETES MELLITUS: ICD-10-CM

## 2025-04-07 DIAGNOSIS — Z00.00 ENCOUNTER FOR MEDICARE ANNUAL WELLNESS EXAM: Primary | Chronic | ICD-10-CM

## 2025-04-07 DIAGNOSIS — K21.9 GASTROESOPHAGEAL REFLUX DISEASE WITHOUT ESOPHAGITIS: ICD-10-CM

## 2025-04-07 DIAGNOSIS — E11.59 HYPERTENSION ASSOCIATED WITH DIABETES: ICD-10-CM

## 2025-04-07 DIAGNOSIS — I15.2 HYPERTENSION ASSOCIATED WITH DIABETES: ICD-10-CM

## 2025-04-07 DIAGNOSIS — E11.42 CONTROLLED TYPE 2 DIABETES MELLITUS WITH DIABETIC POLYNEUROPATHY, WITHOUT LONG-TERM CURRENT USE OF INSULIN: ICD-10-CM

## 2025-04-07 DIAGNOSIS — N18.32 STAGE 3B CHRONIC KIDNEY DISEASE: ICD-10-CM

## 2025-04-07 DIAGNOSIS — N39.0 RECURRENT UTI: ICD-10-CM

## 2025-04-07 DIAGNOSIS — H40.1131 PRIMARY OPEN ANGLE GLAUCOMA OF BOTH EYES, MILD STAGE: ICD-10-CM

## 2025-04-07 PROCEDURE — 99999 PR PBB SHADOW E&M-EST. PATIENT-LVL V: CPT | Mod: PBBFAC,,, | Performed by: NURSE PRACTITIONER

## 2025-04-07 RX ORDER — ROSUVASTATIN CALCIUM 20 MG/1
20 TABLET, COATED ORAL DAILY
Qty: 90 TABLET | Refills: 3 | Status: SHIPPED | OUTPATIENT
Start: 2025-04-07

## 2025-04-07 NOTE — PROGRESS NOTES
"  Natalia Mckeon presented for a  Medicare AWV and comprehensive Health Risk Assessment today. The following components were reviewed and updated:    Medical history  Family History  Social history  Allergies and Current Medications  Health Risk Assessment  Health Maintenance  Care Team         ** See Completed Assessments for Annual Wellness Visit within the encounter summary.**         The following assessments were completed:  Living Situation  CAGE  Depression Screening  Timed Get Up and Go  Whisper Test  Cognitive Function Screening  Nutrition Screening  ADL Screening  PAQ Screening      Opioid documentation:      Patient {does/does not have a current opioid prescription:04469}     Vitals:    04/07/25 1038   BP: (!) 142/86   Pulse: 78   Resp: 18   Temp: 97.4 °F (36.3 °C)   Weight: 63.8 kg (140 lb 10.5 oz)   Height: 5' 2" (1.575 m)     Body mass index is 25.73 kg/m².  Physical Exam          Diagnoses and health risks identified today and associated recommendations/orders:    1. Encounter for Medicare annual wellness exam  ***  - Referral to Enhanced Annual Wellness Visit (eAWV) M+2    2. Type 2 diabetes mellitus with stable proliferative retinopathy of both eyes, without long-term current use of insulin  ***      Provided Natalia with a 5-10 year written screening schedule and personal prevention plan. Recommendations were developed using the USPSTF age appropriate recommendations. Education, counseling, and referrals were provided as needed. After Visit Summary printed and given to patient which includes a list of additional screenings\tests needed.    No follow-ups on file.    Arlene Mendez NP    " Virtual Regular Visit      Assessment/Plan:    Problem List Items Addressed This Visit        Digestive    Colon polyp - Primary    GERD (gastroesophageal reflux disease)     Intermittent symptoms, takes antacid as needed  Endocrine    Acquired hypothyroidism     Still with fatigue, taking medication appropriately  Keep appointment with Endo next month  Cardiovascular and Mediastinum    Mild dilation of ascending aorta (HCC)     Size 3 8 cm on echo  She will see cardiology in [place]  Other    ADHD (attention deficit hyperactivity disorder), combined type     On Adderall, per psych  Relevant Medications    ALPRAZolam (XANAX) 1 mg tablet    Generalized anxiety disorder with panic attacks     Taking alprazolam prn only  She will ask if psych can take over this prescription  PDMP reviewed  Relevant Medications    ALPRAZolam (XANAX) 1 mg tablet    Insomnia     On trazodone, pre psych  Major depressive disorder, recurrent episode, moderate (HCC)     Stable, on Pristiqu  Relevant Medications    ALPRAZolam (XANAX) 1 mg tablet    Other constipation     Will give samples of Linzess, prescribed by GI  Relevant Medications    lubiprostone (AMITIZA) 8 mcg capsule    Other fatigue     Thyroid adequately replaced, depression has improved  Episodes of snoring and daytime sleepiness  Agrees to do home sleepy study  Relevant Orders    Home Study    Pure hypercholesterolemia     Repeat lipids, not on medication  Relevant Orders    Comprehensive metabolic panel    Lipid panel    Smoking     Briefly discussed smoking cessation  Vitamin D deficiency    Relevant Orders    Vitamin D 25 hydroxy      Other Visit Diagnoses     Encounter for long-term current use of medication        Relevant Orders    Vitamin B12    Anxiety        Relevant Medications    ALPRAZolam (XANAX) 1 mg tablet        Follow up in 3 months or as needed         Reason for visit is f/u  Chief Complaint   Patient presents with    Follow-up    Virtual Regular Visit        Encounter provider Delmy Adkins MD    Provider located at [address] [address]      Recent Visits  No visits were found meeting these conditions  Showing recent visits within past 7 days and meeting all other requirements     Today's Visits  Date Type Provider Dept   01/05/21 Telemedicine Delmy Adkins, [address] [facility] Internal Med   Showing today's visits and meeting all other requirements     Future Appointments  No visits were found meeting these conditions  Showing future appointments within next 150 days and meeting all other requirements        The patient was identified by name and date of birth  Cande Han was informed that this is a telemedicine visit and that the visit is being conducted through [facility] and patient was informed that this is a secure, HIPAA-compliant platform  She agrees to proceed     My office door was closed  No one else was in the room  She acknowledged consent and understanding of privacy and security of the video platform  The patient has agreed to participate and understands they can discontinue the visit at any time  Patient is aware this is a billable service  Subjective  Cande Han is a 58 y o  female f/u   She continues to complain of fatigue  She feels so tired all the time, has no energy  She was initially going to the gym working out, frustrated that she is unable to lose weight  She does have an appointment with endocrinology  She has been taking her thyroid medication appropriately  She had a colonoscopy recently  She was prescribed Linzess for constipation, did not feel it due to cost   She had tried MiraLax and Benefiber in the past, this would usually cause bloating     She is worried that 11 polyps were found, pathology not yet available  She also worries about the mild dilatation of her aorta seen on her echo  She had a will see her 's cardiologist in [facility]  Her  reports that she snores sometimes, episodes  Where she stops breathing  This does occur daily  She reports anxiety and depression has improved, continues to see her psychiatrist   She will be seeing a  Counselor as well  She reports an occasional flutter that she feels her chest, not palpitations or chest pains  This does not occur daily, not associated with other symptoms  Past Medical History:   Diagnosis Date    Anxiety     Anxiety     Colon polyp     Panic attacks        Past Surgical History:   Procedure Laterality Date     SECTION  1984    COLONOSCOPY      HYSTERECTOMY         Current Outpatient Medications   Medication Sig Dispense Refill    ALPRAZolam (XANAX) 1 mg tablet Take 1/2 to 1 tab PO bid prn anxiety 60 tablet 0    amphetamine-dextroamphetamine (ADDERALL XR) 20 MG 24 hr capsule Take 1 capsule (20 mg total) by mouth every morningMax Daily Amount: 20 mg 30 capsule 0    desvenlafaxine (PRISTIQ) 100 mg 24 hr tablet Take 1 tablet (100 mg total) by mouth daily 30 tablet 2    levothyroxine 25 mcg tablet Take 1 tab x 5 days, 2 tabs x 2 days a week 100 tablet 1    linaCLOtide (Linzess) 72 MCG CAPS Take 1 tablet by mouth daily 30 capsule 3    traZODone (DESYREL) 50 mg tablet Take 1 tablet (50 mg total) by mouth daily at bedtime as needed for sleep 30 tablet 1    lubiprostone (AMITIZA) 8 mcg capsule Take 1 capsule (8 mcg total) by mouth 2 (two) times a day with meals 60 capsule 1     No current facility-administered medications for this visit        Facility-Administered Medications Ordered in Other Visits   Medication Dose Route Frequency Provider Last Rate Last Admin    lactated ringers infusion  100 mL/hr Intravenous Continuous Read MD Magdi   New Bag at 21 1814        No Known Allergies    Review of Systems   Constitutional: Positive for fatigue  Negative for activity change and appetite change  HENT: Negative for congestion and ear pain  Eyes: Negative for visual disturbance  Respiratory: Negative for cough and shortness of breath  Cardiovascular: Negative for chest pain and palpitations  Gastrointestinal: Positive for constipation  Negative for abdominal pain and diarrhea  Genitourinary: Negative for dysuria and frequency  Musculoskeletal: Negative for arthralgias and myalgias  Skin: Negative for rash and wound  Neurological: Negative for dizziness, tremors, weakness and headaches  Psychiatric/Behavioral: Positive for dysphoric mood and sleep disturbance  Negative for confusion  The patient is nervous/anxious  Video Exam    Vitals:    01/05/21 1001   Weight: 83 9 kg (185 lb)   Height: 5' 3" (1 6 m)       Physical Exam  Constitutional:       General: She is not in acute distress  Appearance: Normal appearance  She is not ill-appearing  HENT:      Head: Normocephalic and atraumatic  Eyes:      Pupils: Pupils are equal, round, and reactive to light  Pulmonary:      Effort: Pulmonary effort is normal  No respiratory distress  Neurological:      General: No focal deficit present  Mental Status: She is alert and oriented to person, place, and time  Psychiatric:         Mood and Affect: Mood normal          Behavior: Behavior normal           I spent 14 minutes directly with the patient during this visit      VIRTUAL VISIT DISCLAIMER    Enzo Garcia acknowledges that she has consented to an online visit or consultation  She understands that the online visit is based solely on information provided by her, and that, in the absence of a face-to-face physical evaluation by the physician, the diagnosis she receives is both limited and provisional in terms of accuracy and completeness   This is not intended to replace a full medical face-to-face evaluation by the physician  Lonnie Cartagena understands and accepts these terms  Labs & imaging results reviewed with patient

## 2025-04-07 NOTE — PROGRESS NOTES
"  Natalia Mckeon presented for a  Medicare AWV and comprehensive Health Risk Assessment today. The following components were reviewed and updated:    Medical history  Family History  Social history  Allergies and Current Medications  Health Risk Assessment  Health Maintenance  Care Team         ** See Completed Assessments for Annual Wellness Visit within the encounter summary.**         The following assessments were completed:  Living Situation  CAGE  Depression Screening  Timed Get Up and Go  Whisper Test  Cognitive Function Screening  Nutrition Screening  ADL Screening  PAQ Screening      Opioid documentation:      Patient does not have a current opioid prescription.        Vitals:    04/07/25 1038   BP: (!) 142/86   Pulse: 78   Resp: 18   Temp: 97.4 °F (36.3 °C)   Weight: 63.8 kg (140 lb 10.5 oz)   Height: 5' 2" (1.575 m)     Body mass index is 25.73 kg/m².  Physical Exam  Vitals and nursing note reviewed.   Constitutional:       Appearance: Normal appearance. She is well-developed.   HENT:      Head: Normocephalic and atraumatic.   Eyes:      Pupils: Pupils are equal, round, and reactive to light.   Neck:      Vascular: No carotid bruit.   Cardiovascular:      Rate and Rhythm: Normal rate and regular rhythm.      Pulses: Normal pulses.      Heart sounds: Normal heart sounds. No murmur heard.     No gallop.   Pulmonary:      Effort: Pulmonary effort is normal.      Breath sounds: Normal breath sounds.   Abdominal:      General: Bowel sounds are normal. There is no distension.      Palpations: Abdomen is soft.      Tenderness: There is no abdominal tenderness.   Musculoskeletal:         General: No tenderness. Normal range of motion.   Skin:     General: Skin is warm and dry.   Neurological:      Mental Status: She is alert.      Motor: No abnormal muscle tone.      Gait: Gait normal.   Psychiatric:         Speech: Speech normal.         Behavior: Behavior normal.         Thought Content: Thought content normal.  "        Judgment: Judgment normal.     Current Outpatient Medications   Medication Instructions    aspirin 81 mg, Oral, Every other day    blood sugar diagnostic (ONETOUCH VERIO TEST STRIPS) Strp CHECK TWICE DAILY    blood sugar diagnostic Strp Check sugars daily prn    blood-glucose meter kit To check BG 2 times daily, to use with insurance preferred meter  Dx:  E11.9    brimonidine 0.2% (ALPHAGAN) 0.2 % Drop 1 drop, Both Eyes, 2 times daily    CALCIUM CARBONATE (CALCIUM 300 ORAL) Take by mouth.    cranberry conc-C-bacillus coag 250-30-50 mg-mg-million Tab Take by mouth.    cyanocobalamin (VITAMIN B-12) 1,000 mcg, Oral, Daily    doxycycline (MONODOX) 100 mg    empagliflozin (JARDIANCE) 25 mg, Oral, Daily    fluticasone-salmeterol diskus inhaler 250-50 mcg 1 puff, Inhalation, 2 times daily    lancets Misc To check BG 2 times daily, to use with insurance preferred meter  Dx:  E11.9    levothyroxine (SYNTHROID) 25 mcg, Oral, Daily    metFORMIN (GLUCOPHAGE) 500 mg, Oral, 2 times daily with meals    olmesartan-amLODIPin-hcthiazid (TRIBENZOR) 40-10-12.5 mg Tab 1 tablet, Oral, Daily    omeprazole (PRILOSEC) 40 mg, Oral, Daily, For acid reflux    polyethylene glycol (GLYCOLAX) 17 g, Oral, Daily    rosuvastatin (CRESTOR) 20 mg, Oral, Daily    sertraline (ZOLOFT) 50 MG tablet TAKE 1 AND 1/2 TABLETS(75 MG) BY MOUTH EVERY EVENING             Diagnoses and health risks identified today and associated recommendations/orders:    1. Encounter for Medicare annual wellness exam  Referral to Enhanced Annual Wellness Visit (eAWV) M+2    2. Controlled type 2 diabetes mellitus with diabetic polyneuropathy, without long-term current use of insulin  Chronic and Ongoing - Metformin   Continue current treatment plan as previously prescribed with your PCP  Discussed and recommend  low fat/carb/chol diet. Cardio exercise as tolerated. Life style modifications.    3. Hypertension associated with diabetes  Chronic and Stable on Trichobezoar.  Continue current treatment plan as previously prescribed with your PCP      4. Stage 3b chronic kidney disease  Chronic and Stable on Trichobezoar- no longer Jardiance. Continue current treatment plan as previously prescribed with your PCP    5. Hyperlipidemia associated with type 2 diabetes mellitus  Chronic and Stable on Crestor/  Discussed and recommend  low fat/carb/chol diet. Cardio exercise as tolerated. Life style modifications.   Continue current treatment plan as previously prescribed with your PCP    6. Type 2 diabetes mellitus with stable proliferative retinopathy of both eyes, without long-term current use of insulin  Chronic and Stable. Continue current treatment plan as previously prescribed with your opth md    7. Primary open angle glaucoma of both eyes, mild stage  Chronic and Stable IOP pressure. Continue current treatment plan as previously prescribed with your PCP    8. Mild intermittent asthma without complication  Chronic and Stable on  Advair PRN. Continue current treatment plan as previously prescribed with your PCP    9. Gastroesophageal reflux disease without esophagitis  Chronic and Stable on Priolesc. Continue current treatment plan as previously prescribed with your PCP    10. Anxiety  Chronic and Stable on Zoloft Continue current treatment plan as previously prescribed with your PCP    11. Acquired hypothyroidism  Chronic and Stable on Synthriod. Continue current treatment plan as previously prescribed with your PCP    12. Osteoporosis, unspecified osteoporosis type, unspecified pathological fracture presence  Chronic and Stable. Followed by PCP      13. Vitamin D deficiency  Chronic and Stable vitamin D. Continue current treatment plan as previously prescribed with your PCP    14. Recurrent UTI  Chronic and Stable on Monodox. Continue current treatment plan as previously prescribed with your urologist    I offered to discuss advanced care planning, including how to pick a person who  would make decisions for you if you were unable to make them for yourself, called a health care power of , and what kind of decisions you might make such as use of life sustaining treatments such as ventilators and tube feeding when faced with a life limiting illness recorded on a living will that they will need to know. (How you want to be cared for as you near the end of your natural life)     X  Patient has advanced directives on file, which we reviewed, and they do not wish to make changes.      Provided Natalia with a 5-10 year written screening schedule and personal prevention plan. Recommendations were developed using the USPSTF age appropriate recommendations. Education, counseling, and referrals were provided as needed. After Visit Summary printed and given to patient which includes a list of additional screenings\tests needed.    Follow up in about 1 year (around 4/7/2026).    Arlene Mendez NP

## 2025-04-07 NOTE — PATIENT INSTRUCTIONS
Counseling and Referral of Other Preventative  (Italic type indicates deductible and co-insurance are waived)    Patient Name: Natalia Mckeon  Today's Date: 4/7/2025    Health Maintenance       Date Due Completion Date    RSV Vaccine (Age 60+ and Pregnant patients) (1 - 1-dose 75+ series) Never done ---    COVID-19 Vaccine (5 - 2024-25 season) 09/01/2024 6/28/2022    Hemoglobin A1c 06/06/2025 3/6/2025    Lipid Panel 11/07/2025 11/7/2024    Diabetes Urine Screening 03/06/2026 3/6/2025    Diabetic Eye Exam 04/01/2026 4/1/2025    Override on 4/1/2025: Done    Override on 9/26/2023: Done    Override on 3/1/2022: Done    Override on 11/20/2019: Done    Override on 12/19/2018: Done    Override on 7/20/2015: Done    Override on 7/20/2015: Done    DEXA Scan 03/06/2027 3/6/2025    TETANUS VACCINE 04/11/2034 4/11/2024        No orders of the defined types were placed in this encounter.    The following information is provided to all patients.  This information is to help you find resources for any of the problems found today that may be affecting your health:                  Living healthy guide: www.[facility].gov      Understanding Diabetes: www.diabetes.org      Eating healthy: www.cdc.gov/healthyweight      CDC home safety checklist: www.cdc.gov/steadi/patient.html      Agency on Aging: www.goea.[place].gov      Alcoholics anonymous (AA): www.aa.org      Physical Activity: www.elicia.nih.gov/uo3qdxo      Tobacco use: www.quitwithusla.org

## 2025-05-01 ENCOUNTER — OFFICE VISIT (OUTPATIENT)
Dept: PRIMARY CARE CLINIC | Facility: CLINIC | Age: 80
End: 2025-05-01
Payer: MEDICARE

## 2025-05-01 VITALS
SYSTOLIC BLOOD PRESSURE: 126 MMHG | BODY MASS INDEX: 25.19 KG/M2 | WEIGHT: 136.88 LBS | HEIGHT: 62 IN | DIASTOLIC BLOOD PRESSURE: 62 MMHG | OXYGEN SATURATION: 98 % | TEMPERATURE: 98 F

## 2025-05-01 DIAGNOSIS — R63.4 WEIGHT LOSS: ICD-10-CM

## 2025-05-01 DIAGNOSIS — E86.0 DEHYDRATION: ICD-10-CM

## 2025-05-01 DIAGNOSIS — E11.69 HYPERLIPIDEMIA ASSOCIATED WITH TYPE 2 DIABETES MELLITUS: ICD-10-CM

## 2025-05-01 DIAGNOSIS — E11.59 HYPERTENSION ASSOCIATED WITH DIABETES: ICD-10-CM

## 2025-05-01 DIAGNOSIS — E78.5 HYPERLIPIDEMIA ASSOCIATED WITH TYPE 2 DIABETES MELLITUS: ICD-10-CM

## 2025-05-01 DIAGNOSIS — I15.2 HYPERTENSION ASSOCIATED WITH DIABETES: ICD-10-CM

## 2025-05-01 DIAGNOSIS — J45.20 MILD INTERMITTENT ASTHMA WITHOUT COMPLICATION: Primary | ICD-10-CM

## 2025-05-01 DIAGNOSIS — N18.32 STAGE 3B CHRONIC KIDNEY DISEASE: ICD-10-CM

## 2025-05-01 DIAGNOSIS — E11.3553 TYPE 2 DIABETES MELLITUS WITH STABLE PROLIFERATIVE RETINOPATHY OF BOTH EYES, WITHOUT LONG-TERM CURRENT USE OF INSULIN: ICD-10-CM

## 2025-05-01 LAB
ABSOLUTE EOSINOPHIL (OHS): 0.13 K/UL
ABSOLUTE MONOCYTE (OHS): 0.41 K/UL (ref 0.3–1)
ABSOLUTE NEUTROPHIL COUNT (OHS): 4.37 K/UL (ref 1.8–7.7)
ANION GAP (OHS): 7 MMOL/L (ref 8–16)
BASOPHILS # BLD AUTO: 0.01 K/UL
BASOPHILS NFR BLD AUTO: 0.2 %
BUN SERPL-MCNC: 32 MG/DL (ref 8–23)
CALCIUM SERPL-MCNC: 9.3 MG/DL (ref 8.7–10.5)
CHLORIDE SERPL-SCNC: 103 MMOL/L (ref 95–110)
CO2 SERPL-SCNC: 28 MMOL/L (ref 23–29)
CREAT SERPL-MCNC: 1.6 MG/DL (ref 0.5–1.4)
ERYTHROCYTE [DISTWIDTH] IN BLOOD BY AUTOMATED COUNT: 14.5 % (ref 11.5–14.5)
GFR SERPLBLD CREATININE-BSD FMLA CKD-EPI: 32 ML/MIN/1.73/M2
GLUCOSE SERPL-MCNC: 162 MG/DL (ref 70–110)
HCT VFR BLD AUTO: 32.8 % (ref 37–48.5)
HGB BLD-MCNC: 10.5 GM/DL (ref 12–16)
IMM GRANULOCYTES # BLD AUTO: 0.01 K/UL (ref 0–0.04)
IMM GRANULOCYTES NFR BLD AUTO: 0.2 % (ref 0–0.5)
LYMPHOCYTES # BLD AUTO: 1.12 K/UL (ref 1–4.8)
MAGNESIUM SERPL-MCNC: 2 MG/DL (ref 1.6–2.6)
MCH RBC QN AUTO: 26.3 PG (ref 27–31)
MCHC RBC AUTO-ENTMCNC: 32 G/DL (ref 32–36)
MCV RBC AUTO: 82 FL (ref 82–98)
NUCLEATED RBC (/100WBC) (OHS): 0 /100 WBC
PHOSPHATE SERPL-MCNC: 3.4 MG/DL (ref 2.7–4.5)
PLATELET # BLD AUTO: 212 K/UL (ref 150–450)
PMV BLD AUTO: 11.8 FL (ref 9.2–12.9)
POTASSIUM SERPL-SCNC: 4.6 MMOL/L (ref 3.5–5.1)
RBC # BLD AUTO: 4 M/UL (ref 4–5.4)
RELATIVE EOSINOPHIL (OHS): 2.1 %
RELATIVE LYMPHOCYTE (OHS): 18.5 % (ref 18–48)
RELATIVE MONOCYTE (OHS): 6.8 % (ref 4–15)
RELATIVE NEUTROPHIL (OHS): 72.2 % (ref 38–73)
SODIUM SERPL-SCNC: 138 MMOL/L (ref 136–145)
WBC # BLD AUTO: 6.05 K/UL (ref 3.9–12.7)

## 2025-05-01 PROCEDURE — 1101F PT FALLS ASSESS-DOCD LE1/YR: CPT | Mod: CPTII,S$GLB,, | Performed by: NURSE PRACTITIONER

## 2025-05-01 PROCEDURE — 1157F ADVNC CARE PLAN IN RCRD: CPT | Mod: CPTII,S$GLB,, | Performed by: NURSE PRACTITIONER

## 2025-05-01 PROCEDURE — 85025 COMPLETE CBC W/AUTO DIFF WBC: CPT | Performed by: NURSE PRACTITIONER

## 2025-05-01 PROCEDURE — 3078F DIAST BP <80 MM HG: CPT | Mod: CPTII,S$GLB,, | Performed by: NURSE PRACTITIONER

## 2025-05-01 PROCEDURE — 3288F FALL RISK ASSESSMENT DOCD: CPT | Mod: CPTII,S$GLB,, | Performed by: NURSE PRACTITIONER

## 2025-05-01 PROCEDURE — 84100 ASSAY OF PHOSPHORUS: CPT | Performed by: NURSE PRACTITIONER

## 2025-05-01 PROCEDURE — 80048 BASIC METABOLIC PNL TOTAL CA: CPT | Performed by: NURSE PRACTITIONER

## 2025-05-01 PROCEDURE — 1126F AMNT PAIN NOTED NONE PRSNT: CPT | Mod: CPTII,S$GLB,, | Performed by: NURSE PRACTITIONER

## 2025-05-01 PROCEDURE — 83735 ASSAY OF MAGNESIUM: CPT | Performed by: NURSE PRACTITIONER

## 2025-05-01 PROCEDURE — 99999 PR PBB SHADOW E&M-EST. PATIENT-LVL IV: CPT | Mod: PBBFAC,,, | Performed by: NURSE PRACTITIONER

## 2025-05-01 PROCEDURE — 99214 OFFICE O/P EST MOD 30 MIN: CPT | Mod: S$GLB,,, | Performed by: NURSE PRACTITIONER

## 2025-05-01 PROCEDURE — 1159F MED LIST DOCD IN RCRD: CPT | Mod: CPTII,S$GLB,, | Performed by: NURSE PRACTITIONER

## 2025-05-01 PROCEDURE — 3074F SYST BP LT 130 MM HG: CPT | Mod: CPTII,S$GLB,, | Performed by: NURSE PRACTITIONER

## 2025-05-01 NOTE — PROGRESS NOTES
Natalia Mckeon  05/07/2025  [id number]    Jen Carlton MD  Patient Care Team:  Jen Carlton MD as PCP - General (Internal Medicine)  ROSE Chairez MD as Consulting Physician (Ophthalmology)  Ricky Resendez MD as Consulting Physician (Gastroenterology)  Mesha Obrien MD (Obstetrics and Gynecology)  Jaspreet Phillip MD as Consulting Physician (Ophthalmology)  Jl Su MD as Consulting Physician (Otolaryngology)  Naveen, Samuel Monroy MD as Consulting Physician (Family Medicine)  [facility]  Adriana Salazar RN as         [facility]      Chief Complaint:  Chief Complaint   Patient presents with    Follow-up     Pt is complaining of a poor appetite           Assessment/Plan:  1. Mild intermittent asthma without complication  Overview:  Advair 250-50, 1p BID.      2. Hypertension associated with diabetes  Overview:  Hypertension Medications              olmesartan-amLODIPin-hcthiazid (TRIBENZOR) 40-10-12.5 mg Tab Take 1 tablet by mouth once daily.             Assessment & Plan:  Hypotensive during recent illness.  She did hold her medication.   Will resume  Encouraged hydration      3. Hyperlipidemia associated with type 2 diabetes mellitus  Overview:  Rosuva 20mg daily.    Assessment & Plan:   Latest Reference Range & Units 06/10/24 10:47 11/07/24 14:27   LDL Cholesterol 63.0 - 159.0 mg/dL 87.8 77.8   Continue STATIN  Encouraged activity      4. Weight loss  -     CBC W/ AUTO DIFFERENTIAL; Future; Expected date: 05/01/2025  -     Basic Metabolic Panel; Future; Expected date: 05/01/2025  -     Magnesium; Future; Expected date: 05/01/2025  -     Phosphorus; Future; Expected date: 05/01/2025    5. Dehydration  -     CBC W/ AUTO DIFFERENTIAL; Future; Expected date: 05/01/2025  -     Basic Metabolic Panel; Future; Expected date: 05/01/2025  -     Magnesium; Future; Expected date: 05/01/2025  -     Phosphorus; Future;  "Expected date: 05/01/2025    6. Type 2 diabetes mellitus with stable proliferative retinopathy of both eyes, without long-term current use of insulin  Overview:  Intolerant to Jardiance  Diabetes Medications              metFORMIN (GLUCOPHAGE) 500 MG tablet Take 1 tablet (500 mg total) by mouth 2 (two) times daily with meals.    SITagliptin phosphate (JANUVIA) 50 MG Tab Take 1 tablet (50 mg total) by mouth once daily.         DPP4i    Assessment & Plan:   Latest Reference Range & Units 06/10/24 10:47 11/07/24 14:27   Hemoglobin A1C External 4.0 - 5.6 % 8.8 (H) 8.4 (H)   Estimated Avg Glucose 68 - 131 mg/dL 206 (H) 194 (H)   Jardiance stopped  Continue metformin  Januvia started   Monitor CBG    Orders:  -     SITagliptin phosphate (JANUVIA) 50 MG Tab; Take 1 tablet (50 mg total) by mouth once daily.  Dispense: 90 tablet; Refill: 1  -     Hemoglobin A1C; Future; Expected date: 08/01/2025    7. Stage 3b chronic kidney disease  Assessment & Plan:  Jardiance 25 mg stopped after adverse reaction.   Poor appetite  Electrolytes normal and CKD stable  Encouraged hydration and close monitoring of CBG            Worry Score: 2  History of Present Illness:    Last visit with Dr. Carlton 2/6/2025 metformin 500 mg bid, jardiance 10 mg  Last visit with Dr. Wharton 3/6/25 - taking metformin, stopped Jardiance due to dizziness. Still with little dizziness. Last Hgb A1 C 7.5 >> 8.8>>> 8.4  Doxy prophylaxis for recurrent UTI  Jardiance dose?  B/P med in the AM?  No microalbuminuria last U/a    4/1/25 Ophthalmology - Dr. Chairez  3/25 Cardiology - Dr. Vasquez - Stress test - " a little blockage" no intervention    Updated, annual due 6/25:  HM: 9/24 fluvax, 1/21 covid vaccines/booster, 4/24 HAV, 7/15 [id number], 2/18 booster [id number], 7/20 [id number], 4/24 Tdap, 10/14 zostervax at pharmacy, 2019 Shingrix x2, 12/24 [facility]/Gyn Dr. Obrien,3/25 DEXA 3/24 Cscope Dr. Auguste rep 5y, 9/24 Eye Dr. Phillip, 11/16 HCV neg, Cards Dr. Jm Vasquez, " Urol Dr. Lira.      Ms. Mckeon returns for follow up.   Decreased appetite, lost weight  + fatigue  136#  Dizziness/lightheaded  Drinking water, boost glucose control and juie  B/P has been norml  Taking metformin  Walking 1 mile/aerobics - usually 2 days a week  Constipation taking Miralax  Urine - normal volume  B/P 140/80 - have been lower than 120 and did not take the BP med      Patient presents today for follow-up on medication reaction and ongoing symptoms. She experienced a reaction to Jardiance 25mg approximately one week after initiation, characterized by weakness, dizziness, loss of appetite, weight loss, skin peeling, and rash. Blood pressure dropped to 55-68 during this episode. The reaction lasted about a week, with skin changes persisting after initial symptoms. Had a rash that subsequently peeled. Symptoms resolved after discontinuing Jardiance.     She reports decreased appetite, loss of energy, and mild weight loss. She experiences occasional dizziness or lightheadedness, particularly with positional changes. She also reports bilateral leg cramps, most recently two nights ago.     Blood pressure has normalized since the hypotensive episode. She continues Tribenzor with good blood pressure control.     She reports constipation managed with Miralax with good response, though experiences occasional associated abdominal pain. Due to poor appetite, she supplements with Boost with Glucose Control. Her fluid intake includes water and juice, though she notes decreased water consumption. She maintains normal urination patterns, urinating with each water intake. She denies dysuria or hematuria.     She continues Metformin as prescribed. Bone density testing was normal without evidence of osteoporosis.              Denies fever, chills, URI symptoms, chest pain, SOB, palpitations, vomiting, diarrhea, no gross neuro deficits, urinary symptoms and leg swelling.      The following were reviewed: Active problem  list, medication list, allergies, family history, social history, and Health Maintenance.     History:  Past Medical History:   Diagnosis Date    Anxiety     Asthma     Colon polyp     Diabetes mellitus, type 2     Diabetic retinopathy     DM (diabetes mellitus) 2008     11/07/2018    Hyperlipidemia     Hypertension     Hypothyroidism     Insomnia disorder 3/6/2025    Primary open angle glaucoma of both eyes, mild stage 1/2/2018    Pyelonephritis     Recurrent UTI     Dr. Lira follows    Stage 3a chronic kidney disease 3/9/2022     Past Surgical History:   Procedure Laterality Date    HYSTERECTOMY       Family History   Problem Relation Name Age of Onset    Cancer Mother          pancreatic    Hypertension Mother      Cancer Father          prostate    Stroke Brother      Cataracts Brother      Glaucoma Brother      Lupus Sister       Patient Active Problem List   Diagnosis    Hypertension associated with diabetes    Mild intermittent asthma without complication    Vitamin D deficiency    Anxiety    Recurrent UTI    Controlled type 2 diabetes mellitus with diabetic polyneuropathy, without long-term current use of insulin    Family history of glaucoma    Primary open angle glaucoma of both eyes, mild stage    Hyperlipidemia associated with type 2 diabetes mellitus    Acquired hypothyroidism    History of colon polyps    GERD (gastroesophageal reflux disease)    Bilateral hearing loss    Stage 3b chronic kidney disease    Type 2 diabetes mellitus with stable proliferative retinopathy of both eyes, without long-term current use of insulin    Insomnia disorder    Encounter for osteoporosis screening in asymptomatic postmenopausal patient     Review of patient's allergies indicates:   Allergen Reactions    Sulfa (sulfonamide antibiotics) Anaphylaxis    Aspirin Other (See Comments)     Stomach pain    Methocarbamol     Jardiance [empagliflozin] Nausea And Vomiting, Rash and Other (See Comments)     Low blood  pressure       Medications:  Current Outpatient Medications on File Prior to Visit   Medication Sig Dispense Refill    blood sugar diagnostic (ONETOUCH VERIO TEST STRIPS) Strp CHECK TWICE DAILY 200 strip 6    blood sugar diagnostic Strp Check sugars daily prn 100 strip 3    blood-glucose meter kit To check BG 2 times daily, to use with insurance preferred meter  Dx:  E11.9 1 each 0    brimonidine 0.2% (ALPHAGAN) 0.2 % Drop Place 1 drop into both eyes 2 (two) times a day. 15 mL 3    CALCIUM CARBONATE (CALCIUM 300 ORAL) Take by mouth.      cyanocobalamin (VITAMIN B-12) 1000 MCG tablet Take 1 tablet (1,000 mcg total) by mouth once daily.      doxycycline (MONODOX) 100 MG capsule Take 100 mg by mouth.      lancets Misc To check BG 2 times daily, to use with insurance preferred meter  Dx:  E11.9 200 each 3    levothyroxine (SYNTHROID) 25 MCG tablet Take 1 tablet (25 mcg total) by mouth once daily. 90 tablet 1    metFORMIN (GLUCOPHAGE) 500 MG tablet Take 1 tablet (500 mg total) by mouth 2 (two) times daily with meals. 180 tablet 3    olmesartan-amLODIPin-hcthiazid (TRIBENZOR) 40-10-12.5 mg Tab Take 1 tablet by mouth once daily. 360 tablet 3    omeprazole (PRILOSEC) 40 MG capsule Take 1 capsule (40 mg total) by mouth once daily. For acid reflux 90 capsule 3    polyethylene glycol (GLYCOLAX) 17 gram/dose powder Take 17 g by mouth once daily.      rosuvastatin (CRESTOR) 20 MG tablet Take 1 tablet (20 mg total) by mouth once daily. 90 tablet 3    sertraline (ZOLOFT) 50 MG tablet TAKE 1 AND 1/2 TABLETS(75 MG) BY MOUTH EVERY EVENING 135 tablet 3    aspirin 81 MG Chew Take 1 tablet (81 mg total) by mouth every other day. (Patient not taking: Reported on 5/1/2025)  0    cranberry conc-C-bacillus coag 250-30-50 mg-mg-million Tab Take by mouth.      fluticasone-salmeterol diskus inhaler 250-50 mcg Inhale 1 puff into the lungs 2 (two) times daily. 60 each 11     No current facility-administered medications on file prior to visit.        Medications have been reviewed and reconciled with patient at visit today.      Exam:  Vitals:    05/01/25 0958   BP: 126/62   Temp: 98 °F (36.7 °C)       Weight: 62.1 kg (136 lb 14.5 oz)   Body mass index is 25.04 kg/m².      BP Readings from Last 3 Encounters:   05/01/25 126/62   04/07/25 (!) 142/86   03/06/25 134/74     Wt Readings from Last 3 Encounters:   05/01/25 62.1 kg (136 lb 14.5 oz)   04/07/25 63.8 kg (140 lb 10.5 oz)   03/06/25 63.6 kg (140 lb 3.4 oz)         Physical Exam  Vitals reviewed.   Constitutional:       General: She is not in acute distress.     Appearance: Normal appearance.   HENT:      Head: Normocephalic and atraumatic.      Nose: Nose normal.      Mouth/Throat:      Mouth: Mucous membranes are moist.   Eyes:      General: No scleral icterus.     Conjunctiva/sclera: Conjunctivae normal.   Cardiovascular:      Rate and Rhythm: Normal rate and regular rhythm.      Heart sounds: No murmur heard.  Pulmonary:      Effort: Pulmonary effort is normal. No respiratory distress.      Breath sounds: Normal breath sounds.   Abdominal:      Palpations: Abdomen is soft. There is no mass.      Tenderness: There is no abdominal tenderness. There is no right CVA tenderness or left CVA tenderness.   Musculoskeletal:      Cervical back: Normal range of motion and neck supple.      Right lower leg: No edema.      Left lower leg: No edema.   Lymphadenopathy:      Cervical: No cervical adenopathy.   Skin:     General: Skin is warm and dry.   Neurological:      Mental Status: She is alert and oriented to person, place, and time. Mental status is at baseline.   Psychiatric:         Mood and Affect: Mood normal.         Thought Content: Thought content normal.              Laboratory Reviewed:     Lab Results   Component Value Date    WBC 6.05 05/01/2025    HGB 10.5 (L) 05/01/2025    HCT 32.8 (L) 05/01/2025     05/01/2025    CHOL 158 11/07/2024    TRIG 81 11/07/2024    HDL 64 11/07/2024    ALT 22  03/06/2025    AST 61 (H) 03/06/2025     05/01/2025    K 4.6 05/01/2025     05/01/2025    CREATININE 1.6 (H) 05/01/2025    BUN 32 (H) 05/01/2025    CO2 28 05/01/2025    TSH 2.152 06/10/2024    INR 1.0 09/21/2014    HGBA1C 8.4 (H) 03/06/2025       Screening or Prevention Patient's value Goal Complete/Controlled?   HgA1C Testing and Control   Lab Results   Component Value Date    HGBA1C 8.4 (H) 03/06/2025      Annually/Less than 8% No   Lipid profile : 11/07/2024 Annually Yes   LDL control Lab Results   Component Value Date    LDLCALC 77.8 11/07/2024    Annually/Less than 100 mg/dl  Yes   Nephropathy screening Lab Results   Component Value Date    LABMICR 13.0 03/06/2025     Lab Results   Component Value Date    PROTEINUA Negative 01/07/2025    Annually Yes   Blood pressure BP Readings from Last 1 Encounters:   05/01/25 126/62    Less than 140/90 Yes   Dilated retinal exam : 04/01/2025 Annually No   Foot exam   Most Recent Foot Exam Date: Not Found Annually Yes       Health Maintenance  Health Maintenance Topics with due status: Not Due       Topic Last Completion Date    TETANUS VACCINE 04/11/2024    Lipid Panel 11/07/2024    Diabetes Urine Screening 03/06/2025    DEXA Scan 03/06/2025    Diabetic Eye Exam 04/01/2025     Health Maintenance Due   Topic Date Due    RSV Vaccine (Age 60+ and Pregnant patients) (1 - 1-dose 75+ series) Never done    COVID-19 Vaccine (5 - 2024-25 season) 09/01/2024    Hemoglobin A1c  06/06/2025               -Patient's lab results were reviewed and discussed with patient  -Treatment options and alternatives were discussed with the patient. Patient expressed understanding. Patient was given the opportunity to ask questions and be an active participant in their medical care. Patient had no further questions or concerns at this time.         Future Appointments   Date Time Provider Department Center   5/29/2025 10:00 AM FIELDS, VISUAL-ONE [facility] [place]   5/29/2025 10:30 AM  Jaspreet Phillip MD [facility]   6/12/2025  2:00 PM Apple Cooley NP [facility] [place]   7/17/2025  8:10 AM LABORATORY, [facility]   7/24/2025  2:20 PM Jen Carlton MD [facility] [place]          After visit summary printed and given to patient upon discharge.  Patient goals and care plan are included in After visit summary.      The following issues were discussed: The primary encounter diagnosis was Mild intermittent asthma without complication. Diagnoses of Hypertension associated with diabetes, Hyperlipidemia associated with type 2 diabetes mellitus, Weight loss, Dehydration, Type 2 diabetes mellitus with stable proliferative retinopathy of both eyes, without long-term current use of insulin, and Stage 3b chronic kidney disease were also pertinent to this visit.    Health maintenance needs, recent test results and goals of care discussed with pt and questions answered.           MALLORIE Wise, NP-C  [facility] [address] [place] [address]

## 2025-05-07 ENCOUNTER — TELEPHONE (OUTPATIENT)
Dept: PRIMARY CARE CLINIC | Facility: CLINIC | Age: 80
End: 2025-05-07
Payer: MEDICARE

## 2025-05-07 NOTE — TELEPHONE ENCOUNTER
Please contact Ms. Mckeon.  I checked labs at her last visit due to poor appetite and weight loss.    Her blood counts are good.  Electrolytes are normal  Kidney function is stable.    Nothing concerning.    Apple Cooley NP

## 2025-05-07 NOTE — ASSESSMENT & PLAN NOTE
Hypotensive during recent illness.  She did hold her medication.   Will resume  Encouraged hydration

## 2025-05-07 NOTE — ASSESSMENT & PLAN NOTE
Latest Reference Range & Units 06/10/24 10:47 11/07/24 14:27   Hemoglobin A1C External 4.0 - 5.6 % 8.8 (H) 8.4 (H)   Estimated Avg Glucose 68 - 131 mg/dL 206 (H) 194 (H)   Jardiance stopped  Continue metformin  Januvia started   Monitor CBG

## 2025-05-07 NOTE — ASSESSMENT & PLAN NOTE
Jardiance 25 mg stopped after adverse reaction.   Poor appetite  Electrolytes normal and CKD stable  Encouraged hydration and close monitoring of CBG

## 2025-05-07 NOTE — ASSESSMENT & PLAN NOTE
Latest Reference Range & Units 06/10/24 10:47 11/07/24 14:27   LDL Cholesterol 63.0 - 159.0 mg/dL 87.8 77.8   Continue STATIN  Encouraged activity

## 2025-05-29 ENCOUNTER — OFFICE VISIT (OUTPATIENT)
Dept: OPHTHALMOLOGY | Facility: CLINIC | Age: 80
End: 2025-05-29
Payer: MEDICARE

## 2025-05-29 DIAGNOSIS — E11.36 DIABETIC CATARACT OF BOTH EYES: ICD-10-CM

## 2025-05-29 DIAGNOSIS — H40.1131 PRIMARY OPEN ANGLE GLAUCOMA OF BOTH EYES, MILD STAGE: Primary | ICD-10-CM

## 2025-05-29 DIAGNOSIS — E11.9 TYPE 2 DIABETES MELLITUS WITHOUT COMPLICATION, WITHOUT LONG-TERM CURRENT USE OF INSULIN: ICD-10-CM

## 2025-05-29 PROCEDURE — 99214 OFFICE O/P EST MOD 30 MIN: CPT | Mod: S$GLB,,, | Performed by: OPHTHALMOLOGY

## 2025-05-29 PROCEDURE — G2211 COMPLEX E/M VISIT ADD ON: HCPCS | Mod: S$GLB,,, | Performed by: OPHTHALMOLOGY

## 2025-05-29 PROCEDURE — 1157F ADVNC CARE PLAN IN RCRD: CPT | Mod: CPTII,S$GLB,, | Performed by: OPHTHALMOLOGY

## 2025-05-29 PROCEDURE — 92083 EXTENDED VISUAL FIELD XM: CPT | Mod: S$GLB,,, | Performed by: OPHTHALMOLOGY

## 2025-05-29 RX ORDER — BRIMONIDINE TARTRATE 2 MG/ML
1 SOLUTION/ DROPS OPHTHALMIC 2 TIMES DAILY
Qty: 15 ML | Refills: 6 | Status: SHIPPED | OUTPATIENT
Start: 2025-05-29 | End: 2026-05-29

## 2025-05-29 RX ORDER — LATANOPROST 50 UG/ML
1 SOLUTION/ DROPS OPHTHALMIC DAILY
Qty: 2.5 ML | Refills: 6 | Status: SHIPPED | OUTPATIENT
Start: 2025-05-29

## 2025-05-29 NOTE — PROGRESS NOTES
HPI    Patient reports for HVF & 6-8 week iop check. Patient states vision is   doing well and is using drops. Needs a refill on the latanoprost. Denies   any pain, but she has been having some itching.  No other ocular   complaints    Out of latanoprost - no drop this am     DM SINCE 2006  BDR  H/o DME OD  VMA  COAG (NEWLY DX 3/2018)  +FAMHX-GLAUCOMA-BROTHER      Latanoprost QHS OU  Brimonidine BID OU  Last edited by Jaspreet Phillip MD on 5/29/2025 10:53 AM.            Assessment /Plan     For exam results, see Encounter Report.      ICD-10-CM ICD-9-CM    1. Primary open angle glaucoma of both eyes, mild stage [H40.1131]  H40.1131 365.11 Carlos Visual Field - OU - Extended - Both Eyes     365.71 CANCELED: Posterior Segment OCT Optic Nerve- Both eyes    Increased IOP off meds this morning   Resume meds   Visit today included increased complexity associated with the care and management of glaucoma and Diabetes. Patient aware that management of medical condition requires long term follow up.  Written instructions were placed in the portal for the patient upon closure of the encounter regarding specific use of the required medications.          2. Type 2 diabetes mellitus without complication, without long-term current use of insulin  E11.9 250.00 Lab Results   Component Value Date    HGBA1C 8.4 (H) 03/06/2025           3. Diabetic cataract of both eyes  E11.36 250.50 Follow at this time      366.41           Return to clinic 6 week IOP and GOCT     Latanoprost QHS OU   Brimonidine BID OU

## 2025-06-09 ENCOUNTER — HOSPITAL ENCOUNTER (EMERGENCY)
Facility: HOSPITAL | Age: 80
Discharge: HOME | End: 2025-06-09
Attending: EMERGENCY MEDICINE
Payer: MEDICARE

## 2025-06-09 ENCOUNTER — NURSE TRIAGE (OUTPATIENT)
Dept: ADMINISTRATIVE | Facility: CLINIC | Age: 80
End: 2025-06-09
Payer: MEDICARE

## 2025-06-09 ENCOUNTER — TELEPHONE (OUTPATIENT)
Dept: PRIMARY CARE CLINIC | Facility: CLINIC | Age: 80
End: 2025-06-09
Payer: MEDICARE

## 2025-06-09 ENCOUNTER — OFFICE VISIT (OUTPATIENT)
Dept: OPHTHALMOLOGY | Facility: CLINIC | Age: 80
End: 2025-06-09
Payer: MEDICARE

## 2025-06-09 VITALS
RESPIRATION RATE: 18 BRPM | DIASTOLIC BLOOD PRESSURE: 76 MMHG | SYSTOLIC BLOOD PRESSURE: 173 MMHG | HEART RATE: 93 BPM | BODY MASS INDEX: 25.5 KG/M2 | OXYGEN SATURATION: 95 % | TEMPERATURE: 98 F | WEIGHT: 139.38 LBS

## 2025-06-09 DIAGNOSIS — I10 CHRONIC HYPERTENSION: ICD-10-CM

## 2025-06-09 DIAGNOSIS — H40.51X3 NEOVASCULAR GLAUCOMA OF RIGHT EYE, SEVERE STAGE: ICD-10-CM

## 2025-06-09 DIAGNOSIS — H40.051 INCREASED PRESSURE IN THE EYE, RIGHT: ICD-10-CM

## 2025-06-09 DIAGNOSIS — H40.51X3 NVG (NEOVASCULAR GLAUCOMA), RIGHT, SEVERE STAGE: Primary | ICD-10-CM

## 2025-06-09 DIAGNOSIS — H40.051 INCREASED INTRAOCULAR PRESSURE, RIGHT: Primary | ICD-10-CM

## 2025-06-09 PROCEDURE — 99214 OFFICE O/P EST MOD 30 MIN: CPT | Mod: S$GLB,,, | Performed by: OPHTHALMOLOGY

## 2025-06-09 PROCEDURE — 1160F RVW MEDS BY RX/DR IN RCRD: CPT | Mod: CPTII,S$GLB,, | Performed by: OPHTHALMOLOGY

## 2025-06-09 PROCEDURE — 96365 THER/PROPH/DIAG IV INF INIT: CPT

## 2025-06-09 PROCEDURE — 25000003 PHARM REV CODE 250: Performed by: EMERGENCY MEDICINE

## 2025-06-09 PROCEDURE — 99284 EMERGENCY DEPT VISIT MOD MDM: CPT | Mod: 25

## 2025-06-09 PROCEDURE — 99999 PR PBB SHADOW E&M-EST. PATIENT-LVL I: CPT | Mod: PBBFAC,,, | Performed by: OPHTHALMOLOGY

## 2025-06-09 PROCEDURE — 1157F ADVNC CARE PLAN IN RCRD: CPT | Mod: CPTII,S$GLB,, | Performed by: OPHTHALMOLOGY

## 2025-06-09 PROCEDURE — 1159F MED LIST DOCD IN RCRD: CPT | Mod: CPTII,S$GLB,, | Performed by: OPHTHALMOLOGY

## 2025-06-09 PROCEDURE — G2211 COMPLEX E/M VISIT ADD ON: HCPCS | Mod: S$GLB,,, | Performed by: OPHTHALMOLOGY

## 2025-06-09 RX ORDER — ATROPINE SULFATE 10 MG/ML
1 SOLUTION/ DROPS OPHTHALMIC 2 TIMES DAILY
Qty: 5 ML | Refills: 1 | Status: SHIPPED | OUTPATIENT
Start: 2025-06-09 | End: 2025-06-10 | Stop reason: SDUPTHER

## 2025-06-09 RX ORDER — PREDNISOLONE ACETATE 10 MG/ML
1 SUSPENSION/ DROPS OPHTHALMIC 2 TIMES DAILY
Qty: 10 ML | Refills: 1 | Status: SHIPPED | OUTPATIENT
Start: 2025-06-09

## 2025-06-09 RX ORDER — MANNITOL 20 G/100ML
95 INJECTION, SOLUTION INTRAVENOUS ONCE
Status: COMPLETED | OUTPATIENT
Start: 2025-06-09 | End: 2025-06-09

## 2025-06-09 RX ADMIN — MANNITOL 95 G: 20 INJECTION, SOLUTION INTRAVENOUS at 07:06

## 2025-06-09 NOTE — PATIENT INSTRUCTIONS
Prednisolone 4xs daily- right eye  Atropine 1% 2xs daily- right eye  Rocklatan 1x daily- right eye  Trusopt 2xs daily- right eye  Brimonidine 2xs daily- right eye

## 2025-06-09 NOTE — TELEPHONE ENCOUNTER
Pt called with c/o severe pain to right eye that started with waking up this morning. Pt also reports loss of vision to left side of eye. Care advice recommends go to ED now. PT verbalized understanding.  Reason for Disposition   SEVERE eye pain (e.g., excruciating)    Protocols used: Eye Pain and Other Symptoms-A-AH

## 2025-06-09 NOTE — TELEPHONE ENCOUNTER
PC with pt- states that she is not going to ED she has appt with Dr. Chairez at 2:40 today- Adriana Salazar RN

## 2025-06-09 NOTE — PROGRESS NOTES
===============================  Date today is 6/9/2025  Natalia Mckeon is a 80 y.o. female  Last visit [facility]: :4/1/2025   Last visit eye dept. 5/29/2025    Uncorrected distance visual acuity was 20/70 in the right eye and 20/40 in the left eye.  Tonometry       Tonometry (Applanation, 3:49 PM)         Right Left    Pressure 58 24              Target Pressure         Right Left    Target 18 18                  Not recorded       Not recorded       Not recorded       Chief Complaint   Patient presents with    Eye Problem     Patient reports with eye pain and loss of vision.      HPI     Eye Problem            Comments: Patient reports with eye pain and loss of vision.          Last edited by Tin Martinez on 6/9/2025  2:21 PM.      Problem List Items Addressed This Visit    None  Visit Diagnoses         NVG (neovascular glaucoma), right, severe stage    -  Primary      Increased pressure in the eye, right              Instructed to call 24/7 for any worsening of vision, visual distortion or pain.  Check OU independently daily.    Gave my office and personal cell phone number.  ________________  6/9/2025 today  Natalia Mckeon    OD redness/pain  1+ flare  No cell  1+ injection compared to left eye  No staining  Sharp disc 0.6 OD  IOP today 58 OD  OD angle open  No NVI    NVA sup/temp dependent blood at 3 o'clock  Would recommend emergent avgf  Pt refused injection against medical advise  Pt understands the risk of potential visual loss without immediate treatment  Spoke with Dr. Phillip and ER  Will send patient to ER for IV mannitol to reduce eye pressure    Start Pred and Atropine 1% BID OD  Rocklatan qhs and Trusopt BID      Will see Dr. Phillip in the morning  =============================

## 2025-06-10 ENCOUNTER — OFFICE VISIT (OUTPATIENT)
Dept: OPHTHALMOLOGY | Facility: CLINIC | Age: 80
End: 2025-06-10
Payer: MEDICARE

## 2025-06-10 ENCOUNTER — PROCEDURE VISIT (OUTPATIENT)
Dept: OPHTHALMOLOGY | Facility: CLINIC | Age: 80
End: 2025-06-10
Payer: MEDICARE

## 2025-06-10 DIAGNOSIS — H40.1131 PRIMARY OPEN ANGLE GLAUCOMA OF BOTH EYES, MILD STAGE: ICD-10-CM

## 2025-06-10 DIAGNOSIS — H40.51X3 NVG (NEOVASCULAR GLAUCOMA), RIGHT, SEVERE STAGE: Primary | ICD-10-CM

## 2025-06-10 DIAGNOSIS — H40.1131 PRIMARY OPEN ANGLE GLAUCOMA OF BOTH EYES, MILD STAGE: Primary | ICD-10-CM

## 2025-06-10 DIAGNOSIS — Z91.199 NON-COMPLIANCE: ICD-10-CM

## 2025-06-10 DIAGNOSIS — E11.36 DIABETIC CATARACT OF BOTH EYES: ICD-10-CM

## 2025-06-10 DIAGNOSIS — H40.51X3 NVG (NEOVASCULAR GLAUCOMA), RIGHT, SEVERE STAGE: ICD-10-CM

## 2025-06-10 DIAGNOSIS — E11.9 TYPE 2 DIABETES MELLITUS WITHOUT COMPLICATION, WITHOUT LONG-TERM CURRENT USE OF INSULIN: ICD-10-CM

## 2025-06-10 PROCEDURE — 99999 PR PBB SHADOW E&M-EST. PATIENT-LVL III: CPT | Mod: PBBFAC,,, | Performed by: OPHTHALMOLOGY

## 2025-06-10 PROCEDURE — 1160F RVW MEDS BY RX/DR IN RCRD: CPT | Mod: CPTII,S$GLB,, | Performed by: OPHTHALMOLOGY

## 2025-06-10 PROCEDURE — 1159F MED LIST DOCD IN RCRD: CPT | Mod: CPTII,S$GLB,, | Performed by: OPHTHALMOLOGY

## 2025-06-10 PROCEDURE — 67028 INJECTION EYE DRUG: CPT | Mod: RT,S$GLB,, | Performed by: OPHTHALMOLOGY

## 2025-06-10 PROCEDURE — G2211 COMPLEX E/M VISIT ADD ON: HCPCS | Mod: S$GLB,,, | Performed by: OPHTHALMOLOGY

## 2025-06-10 PROCEDURE — 1157F ADVNC CARE PLAN IN RCRD: CPT | Mod: CPTII,S$GLB,, | Performed by: OPHTHALMOLOGY

## 2025-06-10 PROCEDURE — 99499 UNLISTED E&M SERVICE: CPT | Mod: S$GLB,,, | Performed by: OPHTHALMOLOGY

## 2025-06-10 PROCEDURE — 2023F DILAT RTA XM W/O RTNOPTHY: CPT | Mod: CPTII,S$GLB,, | Performed by: OPHTHALMOLOGY

## 2025-06-10 PROCEDURE — 99214 OFFICE O/P EST MOD 30 MIN: CPT | Mod: 24,S$GLB,, | Performed by: OPHTHALMOLOGY

## 2025-06-10 RX ORDER — ATROPINE SULFATE 10 MG/ML
1 SOLUTION/ DROPS OPHTHALMIC 2 TIMES DAILY
Qty: 5 ML | Refills: 1 | Status: SHIPPED | OUTPATIENT
Start: 2025-06-10

## 2025-06-10 RX ADMIN — Medication 1.25 MG: at 11:06

## 2025-06-10 NOTE — PROGRESS NOTES
HPI     Follow-up            Comments: Pt states she started drops last night  Pt states her OD is not in as much pain as it was yesterday and her va   cleared up a bit, no longer blurry,.  Went to ER and had 12.5 mg of IV Mannitol - pt states she never went to   Pharmacy last night and it was closed - so did not  pred and   atropine - pt did not use any drops this morning yet           Comments    Right eye:  Pred A 4 times a day - pt never started   Atropine 2 times a day - pt never started   Rocklatan once a day  Trusopt 2 times a day  Brimonidine 2 times a day          Last edited by Jaspreet Phillip MD on 6/10/2025  9:18 AM.            Assessment /Plan     For exam results, see Encounter Report.      ICD-10-CM ICD-9-CM    1. NVG (neovascular glaucoma), right, severe stage  H40.51X3 365.63      365.73       2. Type 2 diabetes mellitus without complication, without long-term current use of insulin  E11.9 250.00       3. Primary open angle glaucoma of both eyes, mild stage [H40.1131]  H40.1131 365.11      365.71       4. Non-compliance  Z91.199 V15.81       5. Diabetic cataract of both eyes  E11.36 250.50      366.41             Right eye:  Pred A 4 times a day - RIGHT EYE   Atropine 2 times a day - RIGHT EYE   Rocklatan once a day BOTH EYES   Trusopt 3 times a day - RIGHT EYE   Brimonidine 2 times a day BOTH EYES       Proceed with injection today with Dr Chairez

## 2025-06-10 NOTE — PROGRESS NOTES
===============================  Date today is 6/10/2025  Natalia Mckeon is a 80 y.o. female  Last visit [facility]: :6/9/2025   Last visit eye dept. 6/10/2025    Uncorrected distance visual acuity was 20/50 in the right eye and 20/30 in the left eye.  Tonometry       Tonometry (Applanation, 10:25 AM)         Right Left    Pressure 56 20              Target Pressure         Right Left    Target 18 18                  Not recorded       Not recorded       Not recorded       Chief Complaint   Patient presents with    NVG     URGENT AVASTIN OD     HPI     NVG            Comments: URGENT AVASTIN OD          Comments    Patient reports for HVF & 6-8 week iop check. Patient states vision is   doing well and is using drops. Needs a refill on the latanoprost. Denies   any pain, but she has been having some itching.  No other ocular   complaints    Out of latanoprost - no drop this am     DM SINCE 2006  BDR  H/o DME OD  VMA  COAG (NEWLY DX 3/2018)  +FAMHX-GLAUCOMA-BROTHER      Latanoprost QHS OU  Brimonidine BID OU          Last edited by Brooklynn Bautista on 6/10/2025 10:24 AM.      Problem List Items Addressed This Visit    None  Visit Diagnoses         NVG (neovascular glaucoma), right, severe stage    -  Primary    Relevant Medications    bevacizumab (Avastin) 25 mg/mL ophthalmic injection syringe 1.25 mg (Completed) (Start on 6/10/2025 11:15 AM)    Other Relevant Orders    Prior authorization Order          Instructed to call 24/7 for any worsening of vision, visual distortion or pain.  Check OU independently daily.    Gave my office and personal cell phone number.  ________________  6/10/2025 today  Natalia Mckeon    OD NVG with increased IOP  Proceed with emergent Avastin OD today    ..    Injection Procedure Note:    6/10/2025  Diagnosis :  OD NVG  Today:  Emergent Avastin (Bevacizumab) 1.25 mg/0.05 ml Intravitreal Injection, OD   Follow up: rtc Friday to check IOP    Instructed to call 24/7 for any worsening of  vision. Check Both eyes daily. Gave patient my home phone number.  Risks, benefits, and alternatives to treatment discussed in detail with the patient.  The patient voiced understanding and wished to proceed with the procedure.     Patient Identified and Time Out complete  Subconjunctival bleb - xylocaine with epi 2%   and Betadine.  Inject at Avastin (Bevacizumab) 1.25 mg/0.05 ml Intravitreal Injection , OD 6:00 @ 3.5-4mm posterior to limbus  1 stop: no   Post Operative Dx: Same  Complications: None  Follow up as above.    =============================

## 2025-06-10 NOTE — ED PROVIDER NOTES
SCRIBE #1 NOTE: I, Hussein Barrios, am scribing for, and in the presence of, Rosalia Araujo MD. I have scribed the entire note.       History     Chief Complaint   Patient presents with    Eye Problem     Sent to ER by Dr. Chairez for IV Mannitol for high eye pressure.     Review of patient's allergies indicates:   Allergen Reactions    Sulfa (sulfonamide antibiotics) Anaphylaxis    Aspirin Other (See Comments)     Stomach pain    Methocarbamol     Jardiance [empagliflozin] Nausea And Vomiting, Rash and Other (See Comments)     Low blood pressure         History of Present Illness     HPI    6/9/2025, 8:16 PM  History obtained from the patient and medical records      History of Present Illness: Natalia Mckeon is a 80 y.o. female patient with a PMHx of HTN, HLD, DM Type 2, and CKD who presents to the Emergency Department for iv medications for increased intraocular pressure to right eye. Pt was sent from Dr Chairez's office for a dose of IV mannitol to reduce eye pressure of right eye. Symptoms are constant and moderate in severity. No mitigating or exacerbating factors reported. Associated sxs include right eye pain and decrease vision to right eye. She has also been prescribed eye drops which has been reviewed and patient is picking up from pharmacy this evening.  No prior Tx specified.  No further complaints or concerns at this time.       Arrival mode: Personal Transportation    PCP: Jen Carlton MD        Past Medical History:  Past Medical History:   Diagnosis Date    Anxiety     Asthma     Colon polyp     Diabetes mellitus, type 2     Diabetic retinopathy     DM (diabetes mellitus) 2008     11/07/2018    Hyperlipidemia     Hypertension     Hypothyroidism     Insomnia disorder 3/6/2025    Primary open angle glaucoma of both eyes, mild stage 1/2/2018    Pyelonephritis     Recurrent UTI     Dr. Lira follows    Stage 3a chronic kidney disease 3/9/2022       Past Surgical History:  Past Surgical  History:   Procedure Laterality Date    HYSTERECTOMY           Family History:  Family History   Problem Relation Name Age of Onset    Cancer Mother          pancreatic    Hypertension Mother      Cancer Father          prostate    Stroke Brother      Cataracts Brother      Glaucoma Brother      Lupus Sister         Social History:  Social History     Tobacco Use    Smoking status: Never    Smokeless tobacco: Never   Substance and Sexual Activity    Alcohol use: No    Drug use: Not on file    Sexual activity: Yes     Partners: Male        Review of Systems     Review of Systems   Constitutional:  Negative for fever.   HENT:  Negative for sore throat.    Eyes:  Positive for pain (right), redness (right) and visual disturbance (right).   Respiratory:  Negative for shortness of breath.    Cardiovascular:  Negative for chest pain.   Gastrointestinal:  Negative for nausea.   Genitourinary:  Negative for dysuria.   Musculoskeletal:  Negative for back pain.   Skin:  Negative for rash.   Neurological:  Negative for weakness.   Hematological:  Does not bruise/bleed easily.   All other systems reviewed and are negative.     Physical Exam     Initial Vitals [06/09/25 1827]   BP Pulse Resp Temp SpO2   (!) 152/77 83 20 97.6 °F (36.4 °C) 97 %      MAP       --          Physical Exam  Nursing Notes and Vital Signs Reviewed.  Constitutional: Patient is in no acute distress. Well-developed and well-nourished.  Head: Atraumatic. Normocephalic.  Eyes: PERRL. EOM intact. Conjunctivae are not pale. Mild right conjunctival injection.   ENT: Mucous membranes are moist. Oropharynx is clear and symmetric.    Neck: Supple. Full ROM. No lymphadenopathy.  Cardiovascular: Regular rate. Regular rhythm. No murmurs, rubs, or gallops. Distal pulses are 2+ and symmetric.  Pulmonary/Chest: No respiratory distress. Clear to auscultation bilaterally. No wheezing or rales.  Abdominal: Soft and non-distended.  There is no tenderness.  No rebound,  guarding, or rigidity. Good bowel sounds.  Musculoskeletal: Moves all extremities. No obvious deformities. No edema. No calf tenderness.  Skin: Warm and dry.  Neurological:  Alert, awake, and appropriate.  Normal speech.  No acute focal neurological deficits are appreciated.  Psychiatric: Normal affect. Good eye contact. Appropriate in content.     ED Course   Critical Care    Date/Time: 6/9/2025 8:00 PM    Performed by: Rosalia Araujo MD  Authorized by: Rosalia Araujo MD  Direct patient critical care time: 35 minutes  Additional history critical care time: 6 minutes  Documentation critical care time: 5 minutes  Consulting other physicians critical care time: 5 minutes  Total critical care time (exclusive of procedural time) : 51 minutes  Critical care was necessary to treat or prevent imminent or life-threatening deterioration of the following conditions: increased intraoccular pressure requiring mannitorl.  Critical care was time spent personally by me on the following activities: blood draw for specimens, development of treatment plan with patient or surrogate, discussions with consultants, evaluation of patient's response to treatment, examination of patient, obtaining history from patient or surrogate, ordering and performing treatments and interventions, re-evaluation of patient's condition and review of old charts.        ED Vital Signs:  Vitals:    06/09/25 1827 06/09/25 1905 06/09/25 1937 06/09/25 2002   BP: (!) 152/77 (!) 150/63 (!) 165/70 (!) 173/76   Pulse: 83 82 84 93   Resp: 20 18     Temp: 97.6 °F (36.4 °C) 97.6 °F (36.4 °C)     TempSrc: Oral      SpO2: 97% 97% 97% 95%   Weight: 63.2 kg (139 lb 6.4 oz)          Abnormal Lab Results:  Labs Reviewed - No data to display     All Lab Results:  None.    Imaging Results:  Imaging Results    None                 The Emergency Provider reviewed the vital signs and test results, which are outlined above.     ED Discussion     8:13 PM: Reassessed pt at  this time. Pt has a f/u with Dr Phillip tomorrow morning at 7:45 AM. Discussed with pt the importance of picking up all the prescribed medications tonight to further reduce pressure. Pt expresses understanding at this time.     Discussed with patient and/or family/caretaker all pertinent ED information and results. Discussed pt dx and plan of tx. Gave the patient all f/u and return to the ED instructions. All questions and concerns were addressed at this time. Patient and/or family/caretaker expresses understanding of information and instructions, and is comfortable with plan to discharge. Pt is stable for discharge.     I discussed with patient and/or family/caretaker that evaluation in the ED does not suggest any emergent or life threatening medical conditions requiring immediate intervention beyond what was provided in the ED, and I believe patient is safe for discharge. Regardless, an unremarkable evaluation in the ED does not preclude the development or presence of a serious or life threatening condition. As such, I instructed that the patient is to return immediately for any worsening or change in current symptoms.         Medical Decision Making  DDX: 1. Increased IOP 2. Uncontrolled HTN    Sent from ophthalmology for iv mannitol due to IOP of right eye, has also received additional eye drops to decrease pressure, has outpatient follow up tomorrow morning already arranged.     Amount and/or Complexity of Data Reviewed  External Data Reviewed: notes.     Details: Reviewed notes from ophthamology office visit today with Dr Chairez: IOP right eye 57, recommended pt to come to ED for a dose of  IV mannitol to further reduce eye pressure. Pt has f/u with Dr Phillip tomorrow morning at 7:45 AM  Discussion of management or test interpretation with external provider(s): Opthalmology    Risk  Prescription drug management.                ED Medication(s):  Medications   mannitol 20% infusion 95 g (0 g Intravenous Stopped  6/9/25 2029)       New Prescriptions    No medications on file        Follow-up Information       Jaspreet Phillip MD In 1 day.    Specialties: Ophthalmology, Surgery  Why: at 7:45 am you have an appointment with Dr. Phillip to recheck your eye pressure  Contact information:  [id number] [address] [facility]  [place] [address]  [phone]                                 Scribe Attestation:   Scribe #1: I performed the above scribed service and the documentation accurately describes the services I performed. I attest to the accuracy of the note.     Attending:   Physician Attestation Statement for Scribe #1: I, Rosalia Araujo MD, personally performed the services described in this documentation, as scribed by Hussein Barrios, in my presence, and it is both accurate and complete.           Clinical Impression       ICD-10-CM ICD-9-CM   1. Increased intraocular pressure, right  H40.051 365.00   2. Neovascular glaucoma of right eye, severe stage  H40.51X3 365.63     365.73   3. Chronic hypertension  I10 401.9       Disposition:   Disposition: Discharged  Condition: Rosalia Lozano MD  06/09/25 1760

## 2025-06-12 ENCOUNTER — OFFICE VISIT (OUTPATIENT)
Dept: PRIMARY CARE CLINIC | Facility: CLINIC | Age: 80
End: 2025-06-12
Payer: MEDICARE

## 2025-06-12 VITALS
HEIGHT: 62 IN | SYSTOLIC BLOOD PRESSURE: 100 MMHG | HEART RATE: 77 BPM | OXYGEN SATURATION: 97 % | BODY MASS INDEX: 25.29 KG/M2 | TEMPERATURE: 98 F | DIASTOLIC BLOOD PRESSURE: 52 MMHG | WEIGHT: 137.44 LBS

## 2025-06-12 DIAGNOSIS — F41.9 ANXIETY: ICD-10-CM

## 2025-06-12 DIAGNOSIS — E11.42 CONTROLLED TYPE 2 DIABETES MELLITUS WITH DIABETIC POLYNEUROPATHY, WITHOUT LONG-TERM CURRENT USE OF INSULIN: ICD-10-CM

## 2025-06-12 DIAGNOSIS — N18.32 STAGE 3B CHRONIC KIDNEY DISEASE: Primary | ICD-10-CM

## 2025-06-12 DIAGNOSIS — E11.59 HYPERTENSION ASSOCIATED WITH DIABETES: ICD-10-CM

## 2025-06-12 DIAGNOSIS — E11.3553 TYPE 2 DIABETES MELLITUS WITH STABLE PROLIFERATIVE RETINOPATHY OF BOTH EYES, WITHOUT LONG-TERM CURRENT USE OF INSULIN: ICD-10-CM

## 2025-06-12 DIAGNOSIS — N18.4 CHRONIC KIDNEY DISEASE (CKD), STAGE IV (SEVERE): ICD-10-CM

## 2025-06-12 DIAGNOSIS — I15.2 HYPERTENSION ASSOCIATED WITH DIABETES: ICD-10-CM

## 2025-06-12 LAB
ANION GAP (OHS): 11 MMOL/L (ref 8–16)
BUN SERPL-MCNC: 26 MG/DL (ref 8–23)
CALCIUM SERPL-MCNC: 9.2 MG/DL (ref 8.7–10.5)
CHLORIDE SERPL-SCNC: 99 MMOL/L (ref 95–110)
CO2 SERPL-SCNC: 25 MMOL/L (ref 23–29)
CREAT SERPL-MCNC: 1.9 MG/DL (ref 0.5–1.4)
GFR SERPLBLD CREATININE-BSD FMLA CKD-EPI: 26 ML/MIN/1.73/M2
GLUCOSE SERPL-MCNC: 162 MG/DL (ref 70–110)
POTASSIUM SERPL-SCNC: 5.3 MMOL/L (ref 3.5–5.1)
SODIUM SERPL-SCNC: 135 MMOL/L (ref 136–145)

## 2025-06-12 PROCEDURE — 3074F SYST BP LT 130 MM HG: CPT | Mod: CPTII,S$GLB,, | Performed by: NURSE PRACTITIONER

## 2025-06-12 PROCEDURE — 82310 ASSAY OF CALCIUM: CPT | Performed by: NURSE PRACTITIONER

## 2025-06-12 PROCEDURE — 3078F DIAST BP <80 MM HG: CPT | Mod: CPTII,S$GLB,, | Performed by: NURSE PRACTITIONER

## 2025-06-12 PROCEDURE — 1125F AMNT PAIN NOTED PAIN PRSNT: CPT | Mod: CPTII,S$GLB,, | Performed by: NURSE PRACTITIONER

## 2025-06-12 PROCEDURE — 99215 OFFICE O/P EST HI 40 MIN: CPT | Mod: S$GLB,,, | Performed by: NURSE PRACTITIONER

## 2025-06-12 PROCEDURE — 99999 PR PBB SHADOW E&M-EST. PATIENT-LVL IV: CPT | Mod: PBBFAC,,, | Performed by: NURSE PRACTITIONER

## 2025-06-12 PROCEDURE — 1157F ADVNC CARE PLAN IN RCRD: CPT | Mod: CPTII,S$GLB,, | Performed by: NURSE PRACTITIONER

## 2025-06-12 RX ORDER — SERTRALINE HYDROCHLORIDE 100 MG/1
100 TABLET, FILM COATED ORAL NIGHTLY
Qty: 30 TABLET | Refills: 1 | Status: SHIPPED | OUTPATIENT
Start: 2025-06-12

## 2025-06-12 RX ORDER — METFORMIN HYDROCHLORIDE 500 MG/1
500 TABLET ORAL 2 TIMES DAILY WITH MEALS
Start: 2025-06-12 | End: 2026-06-12

## 2025-06-12 NOTE — PROGRESS NOTES
Natalia Mckeon  06/17/2025  [id number]    Jen Carlton MD  Patient Care Team:  Jen Carlton MD as PCP - General (Internal Medicine)  ROSE Chairez MD as Consulting Physician (Ophthalmology)  Ricky Resendez MD as Consulting Physician (Gastroenterology)  Mesha Obrien MD (Obstetrics and Gynecology)  Jaspreet Phillip MD as Consulting Physician (Ophthalmology)  Jl Su MD as Consulting Physician (Otolaryngology)  Naveen, Samuel Monroy MD as Consulting Physician (Family Medicine)  [facility] as     Future Appointments   Date Time Provider Department Center   6/18/2025  9:45 AM LABORATORY, [facility]   6/19/2025 11:20 AM Apple Cooley, NP [facility] [place]   7/10/2025  3:20 PM Apple Cooley, NP [facility] [place]   7/15/2025 10:45 AM Jaspreet Phillip MD [facility]PHTHAL [facility]   7/17/2025  8:10 AM LABORATORY, [facility]   7/24/2025  2:20 PM Jen Carlton MD [facility] [place]   7/25/2025  1:30 PM ROSE Chairez MD [facility]       [facility]      Chief Complaint:  Chief Complaint   Patient presents with    poor  appetite         Assessment/Plan:  1. Stage 3b chronic kidney disease  -     Basic Metabolic Panel  -     Basic Metabolic Panel; Future; Expected date: 06/18/2025    2. Type 2 diabetes mellitus with stable proliferative retinopathy of both eyes, without long-term current use of insulin  Overview:  Intolerant to Jardiance  Diabetes Medications              metFORMIN (GLUCOPHAGE) 500 MG tablet Take 1 tablet (500 mg total) by mouth 2 (two) times daily with meals.    SITagliptin phosphate (JANUVIA) 50 MG Tab Take 1 tablet (50 mg total) by mouth once daily.         DPP4i    Assessment & Plan:  Advised for pt to speak with her PCP before stopping medication and taking supplements  Pt verbalized  understanding    Orders:  -     SITagliptin phosphate (JANUVIA) 50 MG Tab; Take 1 tablet (50 mg total) by mouth once daily.    3. Controlled type 2 diabetes mellitus with diabetic polyneuropathy, without long-term current use of insulin  Overview:  Metformin 500mg BID  Januvia 50 mg daily  .    Assessment & Plan:  Monitor your blood glucose levels  Lab Results   Component Value Date    HGBA1C 8.4 (H) 03/06/2025    Continue metformin and Januvia    Orders:  -     metFORMIN (GLUCOPHAGE) 500 MG tablet; Take 1 tablet (500 mg total) by mouth 2 (two) times daily with meals.    4. Anxiety  Overview:  Sertraline 50mg daily.    Assessment & Plan:  She states her anxiety cuts her appetite  Zoloft 50 mg >>>> 100 mg daily    Orders:  -     sertraline (ZOLOFT) 100 MG tablet; Take 1 tablet (100 mg total) by mouth every evening.  Dispense: 30 tablet; Refill: 1    5. Chronic kidney disease (CKD), stage IV (severe)  Assessment & Plan:  According to most recent GFR  Encouraged hydration  Avoid low blood pressure - hold antihypertensives  Control hyperglycemia      6. Hypertension associated with diabetes  Overview:  Hypertension Medications              olmesartan-amLODIPin-hcthiazid (TRIBENZOR) 40-10-12.5 mg Tab Take 1 tablet by mouth once daily.             Assessment & Plan:  Has been hypotensive - denies presyncopal symptoms  She does not take her medication when BP is low  Advised extra hydration and control of blood glucose  RTC for follow up 1 week            Worry Score: 2  History of Present Illness:    Last visit with Dr. Carlton 2/6/2025 On jardiance 10mg, off januvia, restarted metformin 500mg BID x 1.5 months.  Anxiety good on rx.  Walking for exercise, and aerobic twice a week.  No f/c/sw/cough.  BMs normal.  Not taking advair lately, breathin well.  LOV Dr. Wharton 3/6/25 - increased Jardiance from 10 mg to 25 mg  LOV myself 5/1/25 - Medication reaction to Jardiance 25 mg - weakness, dizziness, loss of appetite, weight  loss, skin peeling, and rash. Blood pressure dropped to 55-68 during this episode. The reaction lasted about a week, with skin changes persisting after initial symptoms. Had a rash that subsequently peeled. Symptoms resolved after discontinuing Jardiance.   For uncontrolled diabetes - Jardiance stopped, metformin continued and Januvia (DPP-4 inhibitor) started    Updated, annual due 6/25:  HM: 9/24 fluvax, 1/21 covid vaccines/booster, 4/24 HAV, 7/15 [id number], 2/18 booster [id number], 7/20 [id number], 4/24 Tdap, 10/14 zostervax at pharmacy, 2019 Shingrix x2, 12/24 [facility]/Gyn Dr. Obrien, bmd per her, 3/24 Cscope Dr. Auguste rep 5y, 9/24 Eye Dr. Phillip, 11/16 HCV neg, Cards Dr. Jm Vasquez, Urol Dr. Lira.    Ms. Mckeon returns for follow up of medical problems.   Last few days, 99 - 128 blood glucose well    According to Ms. Mckeon, she stopped the metformin and Januvia for 6 days  and took a supplement contained mulburry Extract and cinnaomon extract  She did this on the advise of [facility]  Samuel Corrigan recommended Glyco Sync - thought would control blood sugar blood sugars during that time 165 - 180 >>> 300  Suddenly she developed severe pain in right eye, vision blurry and saw Opthalmology - IOP was high  Went to ED and given IV mannitol. Ophthalmology has continued to treat her IOP in the right eye. No longer having eye pain, she has photosensitivity    She resumed the Metformin and Januvia  Blood glucose more controlled - 150  Continues to report poor appetite - she makes herself eat and drinks glucose control Boost    BP has been low - she titrates her BP med  B/P 100/62 - last BP med yesterday    + constipation - last BM yesterday, pain to RLQ - stool hard, no nausea, no bloating or distention, no vomiting   She hydrates well    Janurary 143 >>> 137 (6 #)  Aerobics 2 days, walks 1 mile 2 days  Tension, increased anxiety - she feels anxiety cuts her appetite (Zoloft dose increased from 50 mg to 100  mg)  May eat 2 meals per day     Denies fever, chills, URI symptoms, chest pain, SOB, palpitations, vomiting, diarrhea, no gross neuro deficits, urinary symptoms and leg swelling.      The following were reviewed: Active problem list, medication list, allergies, family history, social history, and Health Maintenance.     History:  Past Medical History:   Diagnosis Date    Anxiety     Asthma     Colon polyp     Diabetes mellitus, type 2     Diabetic retinopathy     DM (diabetes mellitus) 2008     11/07/2018    Hyperlipidemia     Hypertension     Hypothyroidism     Insomnia disorder 3/6/2025    Primary open angle glaucoma of both eyes, mild stage 1/2/2018    Pyelonephritis     Recurrent UTI     Dr. Lira follows    Stage 3a chronic kidney disease 3/9/2022     Past Surgical History:   Procedure Laterality Date    HYSTERECTOMY       Family History   Problem Relation Name Age of Onset    Cancer Mother          pancreatic    Hypertension Mother      Cancer Father          prostate    Stroke Brother      Cataracts Brother      Glaucoma Brother      Lupus Sister       Problem List[1]  Review of patient's allergies indicates:   Allergen Reactions    Sulfa (sulfonamide antibiotics) Anaphylaxis    Aspirin Other (See Comments)     Stomach pain    Methocarbamol     Jardiance [empagliflozin] Nausea And Vomiting, Rash and Other (See Comments)     Low blood pressure       PHQ-9       PHILIP-7       Medications:  Medications Ordered Prior to Encounter[2]    Medications have been reviewed and reconciled with patient at visit today.      Exam:  Vitals:    06/12/25 1343   BP: (!) 100/52   Pulse: 77   Temp: 97.5 °F (36.4 °C)     Weight: 62.4 kg (137 lb 7.3 oz)   Body mass index is 25.14 kg/m².      BP Readings from Last 3 Encounters:   06/12/25 (!) 100/52   06/09/25 (!) 173/76   05/01/25 126/62     Wt Readings from Last 3 Encounters:   06/12/25 62.4 kg (137 lb 7.3 oz)   06/09/25 63.2 kg (139 lb 6.4 oz)   05/01/25 62.1 kg (136 lb  14.5 oz)         Physical Exam  Vitals reviewed.   Constitutional:       General: She is not in acute distress.     Appearance: Normal appearance.   HENT:      Head: Normocephalic and atraumatic.      Ears:      Comments: [place]     Nose: Nose normal.      Mouth/Throat:      Mouth: Mucous membranes are moist.   Eyes:      General: No scleral icterus.     Conjunctiva/sclera: Conjunctivae normal.      Comments: Wearing sun shades   Cardiovascular:      Rate and Rhythm: Normal rate and regular rhythm.      Heart sounds: No murmur heard.  Pulmonary:      Effort: Pulmonary effort is normal. No respiratory distress.      Breath sounds: Normal breath sounds.   Abdominal:      Palpations: Abdomen is soft.      Tenderness: There is no abdominal tenderness.   Musculoskeletal:      Cervical back: Normal range of motion and neck supple.      Right lower leg: No edema.      Left lower leg: No edema.   Lymphadenopathy:      Cervical: No cervical adenopathy.   Skin:     General: Skin is warm and dry.   Neurological:      Mental Status: She is alert and oriented to person, place, and time. Mental status is at baseline.   Psychiatric:         Mood and Affect: Mood normal.         Thought Content: Thought content normal.              Laboratory Reviewed:     Lab Results   Component Value Date    WBC 6.05 05/01/2025    HGB 10.5 (L) 05/01/2025    HCT 32.8 (L) 05/01/2025     05/01/2025    CHOL 158 11/07/2024    TRIG 81 11/07/2024    HDL 64 11/07/2024    ALT 22 03/06/2025    AST 61 (H) 03/06/2025     (L) 06/12/2025    K 5.3 (H) 06/12/2025    CL 99 06/12/2025    CREATININE 1.9 (H) 06/12/2025    BUN 26 (H) 06/12/2025    CO2 25 06/12/2025    TSH 2.152 06/10/2024    INR 1.0 09/21/2014    HGBA1C 8.4 (H) 03/06/2025       Screening or Prevention Patient's value Goal Complete/Controlled?   HgA1C Testing and Control   Lab Results   Component Value Date    HGBA1C 8.4 (H) 03/06/2025      Annually/Less than 8% No   Lipid profile :  11/07/2024 Annually Yes   LDL control Lab Results   Component Value Date    LDLCALC 77.8 11/07/2024    Annually/Less than 100 mg/dl  Yes   Nephropathy screening Lab Results   Component Value Date    LABMICR 13.0 03/06/2025     Lab Results   Component Value Date    PROTEINUA Negative 01/07/2025    Annually Yes   Blood pressure BP Readings from Last 1 Encounters:   06/12/25 (!) 100/52    Less than 140/90 No   Dilated retinal exam : 06/10/2025 Annually Yes   Foot exam   Most Recent Foot Exam Date: Not Found Annually Yes       Health Maintenance  Health Maintenance Topics with due status: Not Due       Topic Last Completion Date    TETANUS VACCINE 04/11/2024    Lipid Panel 11/07/2024    Diabetes Urine Screening 03/06/2025    DEXA Scan 03/06/2025    Diabetic Eye Exam 06/10/2025     Health Maintenance Due   Topic Date Due    RSV Vaccine (Age 60+ and Pregnant patients) (1 - 1-dose 75+ series) Never done    COVID-19 Vaccine (5 - 2024-25 season) 09/01/2024    Hemoglobin A1c  06/06/2025               -Patient's lab results were reviewed and discussed with patient  -Treatment options and alternatives were discussed with the patient. Patient expressed understanding. Patient was given the opportunity to ask questions and be an active participant in their medical care. Patient had no further questions or concerns at this time.               After visit summary printed and given to patient upon discharge.  Patient goals and care plan are included in After visit summary.      The following issues were discussed: The primary encounter diagnosis was Stage 3b chronic kidney disease. Diagnoses of Type 2 diabetes mellitus with stable proliferative retinopathy of both eyes, without long-term current use of insulin, Controlled type 2 diabetes mellitus with diabetic polyneuropathy, without long-term current use of insulin, Anxiety, Chronic kidney disease (CKD), stage IV (severe), and Hypertension associated with diabetes were also  pertinent to this visit.    Health maintenance needs, recent test results and goals of care discussed with pt and questions answered.           MALLORIE Wise, NP-C  [facility] [address] [address]          [1]   Patient Active Problem List  Diagnosis    Hypertension associated with diabetes    Mild intermittent asthma without complication    Vitamin D deficiency    Anxiety    Recurrent UTI    Controlled type 2 diabetes mellitus with diabetic polyneuropathy, without long-term current use of insulin    Family history of glaucoma    Primary open angle glaucoma of both eyes, mild stage    Hyperlipidemia associated with type 2 diabetes mellitus    Acquired hypothyroidism    History of colon polyps    GERD (gastroesophageal reflux disease)    Bilateral hearing loss    Type 2 diabetes mellitus with stable proliferative retinopathy of both eyes, without long-term current use of insulin    Insomnia disorder    Encounter for osteoporosis screening in asymptomatic postmenopausal patient    Chronic kidney disease (CKD), stage IV (severe)   [2]   Current Outpatient Medications on File Prior to Visit   Medication Sig Dispense Refill    aspirin 81 MG Chew Take 1 tablet (81 mg total) by mouth every other day.  0    atropine 1% (ISOPTO ATROPINE) 1 % Drop Place 1 drop into the right eye 2 (two) times a day. 5 mL 1    blood sugar diagnostic (ONETOUCH VERIO TEST STRIPS) Strp CHECK TWICE DAILY 200 strip 6    blood sugar diagnostic Strp Check sugars daily prn 100 strip 3    blood-glucose meter kit To check BG 2 times daily, to use with insurance preferred meter  Dx:  E11.9 1 each 0    brimonidine 0.2% (ALPHAGAN) 0.2 % Drop Place 1 drop into both eyes 2 (two) times a day. 15 mL 6    CALCIUM CARBONATE (CALCIUM 300 ORAL) Take by mouth.      cranberry conc-C-bacillus coag 250-30-50 mg-mg-million Tab Take by mouth.      cyanocobalamin (VITAMIN B-12) 1000 MCG tablet Take 1 tablet (1,000 mcg total) by  mouth once daily.      latanoprost 0.005 % ophthalmic solution Place 1 drop into both eyes once daily. 2.5 mL 6    olmesartan-amLODIPin-hcthiazid (TRIBENZOR) 40-10-12.5 mg Tab Take 1 tablet by mouth once daily. 360 tablet 3    omeprazole (PRILOSEC) 40 MG capsule Take 1 capsule (40 mg total) by mouth once daily. For acid reflux 90 capsule 3    polyethylene glycol (GLYCOLAX) 17 gram/dose powder Take 17 g by mouth once daily.      prednisoLONE acetate (PRED FORTE) 1 % DrpS Place 1 drop into the right eye 2 (two) times daily. 10 mL 1    rosuvastatin (CRESTOR) 20 MG tablet Take 1 tablet (20 mg total) by mouth once daily. 90 tablet 3    fluticasone-salmeterol diskus inhaler 250-50 mcg Inhale 1 puff into the lungs 2 (two) times daily. 60 each 11     No current facility-administered medications on file prior to visit.

## 2025-06-12 NOTE — PROGRESS NOTES
Pt had labs drawn,  2 pt identifier verified, tolerated well, no pain , 1 stickPt had labs drawn,  2 pt identifier verified, tolerated well, no pain , 1 stick

## 2025-06-12 NOTE — PATIENT INSTRUCTIONS
Banana - high sugar    Fruits low in sugar - berries, strawberries, blue berries, black berries, apple    Eat more protein food - egg, chicken, cottage cheese,  greek yogurt, cheese    Therefore, keeping your stomach full for longer periods of time. Protein food sources include eggs, cheese, turkey, chicken, beef, pork, seafood, greek yogurt and nuts. The most nutrient-dense carbohydrates include fruits, vegetables and beans.       -2 eggs with a serving of fruit   -Banana or apple with peanut butter/almond butter  -Greek yogurt topped with fruit or nuts of choice  -String cheese and grapes  -Handful nuts (any kind) with fruit  -Toast with 2 Tbsp peanut butter/almond butter  -Cottage cheese with peaches  -Chicken salad with veggie dippers  -Protein supplements (bars/shakes/smoothies)

## 2025-06-13 ENCOUNTER — TELEPHONE (OUTPATIENT)
Dept: OPHTHALMOLOGY | Facility: CLINIC | Age: 80
End: 2025-06-13

## 2025-06-13 ENCOUNTER — OFFICE VISIT (OUTPATIENT)
Dept: OPHTHALMOLOGY | Facility: CLINIC | Age: 80
End: 2025-06-13
Payer: MEDICARE

## 2025-06-13 DIAGNOSIS — E03.9 ACQUIRED HYPOTHYROIDISM: ICD-10-CM

## 2025-06-13 DIAGNOSIS — H40.1131 PRIMARY OPEN ANGLE GLAUCOMA OF BOTH EYES, MILD STAGE: ICD-10-CM

## 2025-06-13 DIAGNOSIS — H40.51X3 NVG (NEOVASCULAR GLAUCOMA), RIGHT, SEVERE STAGE: Primary | ICD-10-CM

## 2025-06-13 DIAGNOSIS — E11.9 TYPE 2 DIABETES MELLITUS WITHOUT COMPLICATION, WITHOUT LONG-TERM CURRENT USE OF INSULIN: ICD-10-CM

## 2025-06-13 PROCEDURE — 99999 PR PBB SHADOW E&M-EST. PATIENT-LVL III: CPT | Mod: PBBFAC,,, | Performed by: OPHTHALMOLOGY

## 2025-06-13 RX ORDER — LEVOTHYROXINE SODIUM 25 UG/1
25 TABLET ORAL DAILY
Qty: 90 TABLET | Refills: 3 | Status: SHIPPED | OUTPATIENT
Start: 2025-06-13

## 2025-06-13 NOTE — PROGRESS NOTES
===============================  Date today is 6/13/2025  Natalia Mckeon is a 80 y.o. female  Last visit [facility]: :6/10/2025   Last visit eye dept. 6/10/2025    Uncorrected distance visual acuity was 20/70 +1 in the right eye and 20/30 +2 in the left eye.  Tonometry       Tonometry ([facility], 9:59 AM)         Right Left    Pressure 36 27              Target Pressure         Right Left    Target 18 18                  Not recorded       Not recorded       Not recorded       Chief Complaint   Patient presents with    Glaucoma     3 day follow up after emergent Avastin OD 6/10/25     Patient states no pain after injection Wednesday and using drops as directed.     HPI     Glaucoma            Comments: 3 day follow up after emergent Avastin OD 6/10/25     Patient states no pain after injection Wednesday and using drops as   directed.          Comments    DM SINCE 2006  BDR  H/o DME OD  VMA  +FAMHX-GLAUCOMA-BROTHER  ALLERGY - SULFA- THEREFORE CAN'T TAKE DIAMOX     Atropine 2 x right eye   Pred 4 x right eye   Rocklatan 1 x OU  Trusopt 2 x daily right eye   Brimonidine 2 x daily OU    Emergent Avastin OD 6/10/25             Last edited by Anastasia Beach, Patient Care Assistant on 6/13/2025  9:54   AM.      Problem List Items Addressed This Visit          Eye/Vision problems    Primary open angle glaucoma of both eyes, mild stage     Other Visit Diagnoses         NVG (neovascular glaucoma), right, severe stage    -  Primary      Type 2 diabetes mellitus without complication, without long-term current use of insulin              Instructed to call 24/7 for any worsening of vision, visual distortion or pain.  Check OU independently daily.    Gave my office and personal cell phone number.  ________________  6/13/2025 today  Natalia Mckeon    OD NVG post Avastin OD  IOP much better today  Will begin PRP OD - 4 sessions  At this time, no emergency surgery  Continue drops as directed for now      RTC next week for PRP OD and  visit with Dr. Phillip same day  Instructed to call 24/7 for any worsening of vision or symptoms. Check OU daily.   Gave my office and cell phone number.      =============================

## 2025-06-13 NOTE — TELEPHONE ENCOUNTER
"Spoke with pt regarding PRP appt scheduled with dr de santiago next week 6/19/25. Pt states that date did not work for her schedule.  Offered her appt the day prior at 7:45, as she has labs at [facility] at 9:45. She states 7:45 was too early. Then offered appt later in the day, which she also deferred.    Pt wishes to wait until July to have PRP. Discussed with pt [facility] wanted to see her back urgently next week to begin laser due to severity of her diagnosis.   Pt states she does not want to proceed with treatment at this time. discussed the possibility of further visual loss, pt understands and desires to wait. She states "her vision is fine now and doesn't see why she needs treatment"  Discussed diagnosis with her, still defers to be seen    [facility] out of the office early July, scheduled per pt request on 7/25/25 for laser treatment.   Recommend she contact office immediately if she notices any visual changes prior to her next appt.  Pt understands deferring treatment is AMA  [facility] made aware  "

## 2025-06-14 DIAGNOSIS — E11.42 CONTROLLED TYPE 2 DIABETES MELLITUS WITH DIABETIC POLYNEUROPATHY, WITHOUT LONG-TERM CURRENT USE OF INSULIN: ICD-10-CM

## 2025-06-16 RX ORDER — LANCETS 33 GAUGE
EACH MISCELLANEOUS
Qty: 200 EACH | Refills: 3 | Status: SHIPPED | OUTPATIENT
Start: 2025-06-16

## 2025-06-17 PROBLEM — N18.4 CHRONIC KIDNEY DISEASE (CKD), STAGE IV (SEVERE): Status: ACTIVE | Noted: 2025-06-17

## 2025-06-17 PROBLEM — N18.32 STAGE 3B CHRONIC KIDNEY DISEASE: Status: RESOLVED | Noted: 2022-03-09 | Resolved: 2025-06-17

## 2025-06-17 NOTE — ASSESSMENT & PLAN NOTE
Has been hypotensive - denies presyncopal symptoms  She does not take her medication when BP is low  Advised extra hydration and control of blood glucose  RTC for follow up 1 week

## 2025-06-17 NOTE — ASSESSMENT & PLAN NOTE
Advised for pt to speak with her PCP before stopping medication and taking supplements  Pt verbalized understanding

## 2025-06-17 NOTE — ASSESSMENT & PLAN NOTE
According to most recent GFR  Encouraged hydration  Avoid low blood pressure - hold antihypertensives  Control hyperglycemia

## 2025-06-17 NOTE — ASSESSMENT & PLAN NOTE
Monitor your blood glucose levels  Lab Results   Component Value Date    HGBA1C 8.4 (H) 03/06/2025    Continue metformin and Januvia

## 2025-06-18 ENCOUNTER — TELEPHONE (OUTPATIENT)
Dept: OPHTHALMOLOGY | Facility: CLINIC | Age: 80
End: 2025-06-18
Payer: MEDICARE

## 2025-06-18 ENCOUNTER — LAB VISIT (OUTPATIENT)
Dept: LAB | Facility: HOSPITAL | Age: 80
End: 2025-06-18
Attending: NURSE PRACTITIONER
Payer: MEDICARE

## 2025-06-18 DIAGNOSIS — N18.32 STAGE 3B CHRONIC KIDNEY DISEASE: ICD-10-CM

## 2025-06-18 LAB
ANION GAP (OHS): 8 MMOL/L (ref 8–16)
BUN SERPL-MCNC: 23 MG/DL (ref 8–23)
CALCIUM SERPL-MCNC: 9 MG/DL (ref 8.7–10.5)
CHLORIDE SERPL-SCNC: 103 MMOL/L (ref 95–110)
CO2 SERPL-SCNC: 24 MMOL/L (ref 23–29)
CREAT SERPL-MCNC: 1.6 MG/DL (ref 0.5–1.4)
GFR SERPLBLD CREATININE-BSD FMLA CKD-EPI: 32 ML/MIN/1.73/M2
GLUCOSE SERPL-MCNC: 190 MG/DL (ref 70–110)
POTASSIUM SERPL-SCNC: 4.5 MMOL/L (ref 3.5–5.1)
SODIUM SERPL-SCNC: 135 MMOL/L (ref 136–145)

## 2025-06-18 PROCEDURE — 80048 BASIC METABOLIC PNL TOTAL CA: CPT

## 2025-06-18 PROCEDURE — 36415 COLL VENOUS BLD VENIPUNCTURE: CPT

## 2025-06-19 ENCOUNTER — OFFICE VISIT (OUTPATIENT)
Dept: PRIMARY CARE CLINIC | Facility: CLINIC | Age: 80
End: 2025-06-19
Payer: MEDICARE

## 2025-06-19 VITALS
TEMPERATURE: 98 F | WEIGHT: 138.44 LBS | HEIGHT: 62 IN | OXYGEN SATURATION: 98 % | HEART RATE: 80 BPM | SYSTOLIC BLOOD PRESSURE: 138 MMHG | DIASTOLIC BLOOD PRESSURE: 78 MMHG | BODY MASS INDEX: 25.48 KG/M2

## 2025-06-19 DIAGNOSIS — E11.42 CONTROLLED TYPE 2 DIABETES MELLITUS WITH DIABETIC POLYNEUROPATHY, WITHOUT LONG-TERM CURRENT USE OF INSULIN: ICD-10-CM

## 2025-06-19 DIAGNOSIS — F41.9 ANXIETY: Primary | ICD-10-CM

## 2025-06-19 DIAGNOSIS — I15.2 HYPERTENSION ASSOCIATED WITH DIABETES: ICD-10-CM

## 2025-06-19 DIAGNOSIS — E11.59 HYPERTENSION ASSOCIATED WITH DIABETES: ICD-10-CM

## 2025-06-19 DIAGNOSIS — G47.00 INSOMNIA, UNSPECIFIED TYPE: ICD-10-CM

## 2025-06-19 DIAGNOSIS — N18.4 CHRONIC KIDNEY DISEASE (CKD), STAGE IV (SEVERE): ICD-10-CM

## 2025-06-19 PROCEDURE — 99999 PR PBB SHADOW E&M-EST. PATIENT-LVL IV: CPT | Mod: PBBFAC,,, | Performed by: NURSE PRACTITIONER

## 2025-06-19 NOTE — ASSESSMENT & PLAN NOTE
She is monitoring her glucose at home  Taking metformin and Januvia  No further supplements  Denies highs - recent readings 140 - 160

## 2025-06-19 NOTE — PROGRESS NOTES
Natalia Mckeon  06/19/2025  [id number]    Jen Carlton MD  Patient Care Team:  Jen Carlton MD as PCP - General (Internal Medicine)  ROSE Chairez MD as Consulting Physician (Ophthalmology)  Ricky Resendez MD as Consulting Physician (Gastroenterology)  Mesha Obrien MD (Obstetrics and Gynecology)  Jaspreet Phillip MD as Consulting Physician (Ophthalmology)  Jl Su MD as Consulting Physician (Otolaryngology)  Naveen, Samuel Monroy MD as Consulting Physician (Family Medicine)  [facility] as     Future Appointments   Date Time Provider Department Center   7/10/2025  3:20 PM Apple Cooley NP [facility] 65+ [place]   7/15/2025 10:45 AM Jaspreet Phillip MD [facility] OPHTHAL [facility]   7/17/2025  8:10 AM LABORATORY, [facility]   7/24/2025  2:20 PM Jen Carlton MD [facility] 65+ [place]   7/25/2025  1:30 PM ROSE Chairez MD [facility] OPH[facility] [place] [facility] Primary Care Note      Chief Complaint:  Chief Complaint   Patient presents with    Follow-up    Results         Assessment/Plan:  1. Anxiety  Overview:  Sertraline 100 mg daily    Assessment & Plan:  Recent dose increase. Less anxiety reported, feels more calm, sleeping better  Appetite improved.       2. Hypertension associated with diabetes  Overview:  Hypertension Medications              olmesartan-amLODIPin-hcthiazid (TRIBENZOR) 40-10-12.5 mg Tab Take 1 tablet by mouth once daily.             Assessment & Plan:  B/P better - still not taking her med if B/P on lower side  She is hydrating      3. Chronic kidney disease (CKD), stage IV (severe)  Assessment & Plan:  GFR slightly improved from last week   Latest Reference Range & Units 06/12/25 14:33 06/18/25 10:01   Creatinine 0.5 - 1.4 mg/dL 1.9 (H) 1.6 (H)   eGFR >60 mL/min/1.73/m2 26 (L) 32 (L)   Advised tighter control of glucose  Hydration to maintain a normal blood  pressure  Do not take B/P med when pressure is on lower side      4. Controlled type 2 diabetes mellitus with diabetic polyneuropathy, without long-term current use of insulin  Overview:  Metformin 500mg BID  Januvia 50 mg daily  .    Assessment & Plan:  She is monitoring her glucose at home  Taking metformin and Januvia  No further supplements  Denies highs - recent readings 140 - 160      5. Insomnia, unspecified type  Assessment & Plan:  Sleep improved with increased Zoloft dose 50 mg to 100 mg            Worry Score: 3  History of Present Illness:    Last visit with Dr. Carlton 2/6/2025     LOV with myself 6/12/25 -  Anxious, poor appetite, hypotension, review BMP due to CKD and recent hyperglycemia.     She returns for 1 week follow up from last visit. Overall, she is improving.  Increased sertraline from 50 mg to 100 mg due to anxiety - possible decrease in appetite. Anxiety better with higher dose of sertraline - able to sleep better at night. Appetite better, has not had additional weight loss.   Recent low blood pressure - hold antihypertensives  Has not taken BP meds in last 2 days - BP noted normal  Whe her BP is noted at 138 - 140 systolic , she will take B/P med  Blood glucose levels at home - 140s - 160s - No concerning highs  Right eye is better (the IOP) - will need laser surgery in July. Being closely followed by Opthalmology  Some sinus drainage and mild cough      Denies fever, chills,  chest pain, SOB, palpitations, vomiting, diarrhea, no gross neuro deficits, urinary symptoms and leg swelling.      The following were reviewed: Active problem list, medication list, allergies, family history, social history, and Health Maintenance.     History:  Past Medical History:   Diagnosis Date    Anxiety     Asthma     Colon polyp     Diabetes mellitus, type 2     Diabetic retinopathy     DM (diabetes mellitus) 2008     11/07/2018    Hyperlipidemia     Hypertension     Hypothyroidism     Insomnia  disorder 3/6/2025    Primary open angle glaucoma of both eyes, mild stage 1/2/2018    Pyelonephritis     Recurrent UTI     Dr. Lira follows    Stage 3a chronic kidney disease 3/9/2022     Past Surgical History:   Procedure Laterality Date    HYSTERECTOMY       Family History   Problem Relation Name Age of Onset    Cancer Mother          pancreatic    Hypertension Mother      Cancer Father          prostate    Stroke Brother      Cataracts Brother      Glaucoma Brother      Lupus Sister       Problem List[1]  Review of patient's allergies indicates:   Allergen Reactions    Sulfa (sulfonamide antibiotics) Anaphylaxis    Aspirin Other (See Comments)     Stomach pain    Methocarbamol     Jardiance [empagliflozin] Nausea And Vomiting, Rash and Other (See Comments)     Low blood pressure       PHQ-9       PHILIP-7       Medications:  Medications Ordered Prior to Encounter[2]    Medications have been reviewed and reconciled with patient at visit today.      Exam:  Vitals:    06/19/25 1106   BP: 138/78   Pulse: 80   Temp: 97.8 °F (36.6 °C)     Weight: 62.8 kg (138 lb 7.2 oz)   Body mass index is 25.32 kg/m².      BP Readings from Last 3 Encounters:   06/19/25 138/78   06/12/25 (!) 100/52   06/09/25 (!) 173/76     Wt Readings from Last 3 Encounters:   06/19/25 62.8 kg (138 lb 7.2 oz)   06/12/25 62.4 kg (137 lb 7.3 oz)   06/09/25 63.2 kg (139 lb 6.4 oz)         Physical Exam  Vitals reviewed.   Constitutional:       General: She is not in acute distress.     Appearance: Normal appearance.   HENT:      Head: Normocephalic and atraumatic.      Nose: Nose normal.      Mouth/Throat:      Mouth: Mucous membranes are moist.   Eyes:      General: No scleral icterus.     Conjunctiva/sclera: Conjunctivae normal.   Cardiovascular:      Rate and Rhythm: Normal rate and regular rhythm.      Heart sounds: No murmur heard.  Pulmonary:      Effort: Pulmonary effort is normal. No respiratory distress.      Breath sounds: Normal breath sounds.    Abdominal:      Palpations: Abdomen is soft. There is no mass.      Tenderness: There is no abdominal tenderness.   Musculoskeletal:      Cervical back: Normal range of motion and neck supple.      Right lower leg: No edema.      Left lower leg: No edema.   Lymphadenopathy:      Cervical: No cervical adenopathy.   Skin:     General: Skin is warm and dry.   Neurological:      Mental Status: She is alert and oriented to person, place, and time. Mental status is at baseline.   Psychiatric:         Mood and Affect: Mood normal.         Thought Content: Thought content normal.              Laboratory Reviewed:     Lab Results   Component Value Date    WBC 6.05 05/01/2025    HGB 10.5 (L) 05/01/2025    HCT 32.8 (L) 05/01/2025     05/01/2025    CHOL 158 11/07/2024    TRIG 81 11/07/2024    HDL 64 11/07/2024    ALT 22 03/06/2025    AST 61 (H) 03/06/2025     (L) 06/18/2025    K 4.5 06/18/2025     06/18/2025    CREATININE 1.6 (H) 06/18/2025    BUN 23 06/18/2025    CO2 24 06/18/2025    TSH 2.152 06/10/2024    INR 1.0 09/21/2014    HGBA1C 8.4 (H) 03/06/2025       Screening or Prevention Patient's value Goal Complete/Controlled?   HgA1C Testing and Control   Lab Results   Component Value Date    HGBA1C 8.4 (H) 03/06/2025      Annually/Less than 8% No   Lipid profile : 11/07/2024 Annually Yes   LDL control Lab Results   Component Value Date    LDLCALC 77.8 11/07/2024    Annually/Less than 100 mg/dl  Yes   Nephropathy screening Lab Results   Component Value Date    LABMICR 13.0 03/06/2025     Lab Results   Component Value Date    PROTEINUA Negative 01/07/2025    Annually Yes   Blood pressure BP Readings from Last 1 Encounters:   06/19/25 138/78    Less than 140/90 Yes   Dilated retinal exam : 06/10/2025 Annually Yes   Foot exam   Most Recent Foot Exam Date: Not Found Annually Yes       Health Maintenance  Health Maintenance Topics with due status: Not Due       Topic Last Completion Date    TETANUS VACCINE  04/11/2024    Lipid Panel 11/07/2024    Diabetes Urine Screening 03/06/2025    DEXA Scan 03/06/2025    Diabetic Eye Exam 06/10/2025     Health Maintenance Due   Topic Date Due    RSV Vaccine (Age 60+ and Pregnant patients) (1 - 1-dose 75+ series) Never done    COVID-19 Vaccine (5 - 2024-25 season) 09/01/2024    Hemoglobin A1c  06/06/2025               -Patient's lab results were reviewed and discussed with patient  -Treatment options and alternatives were discussed with the patient. Patient expressed understanding. Patient was given the opportunity to ask questions and be an active participant in their medical care. Patient had no further questions or concerns at this time.               After visit summary printed and given to patient upon discharge.  Patient goals and care plan are included in After visit summary.      The following issues were discussed: The primary encounter diagnosis was Anxiety. Diagnoses of Hypertension associated with diabetes, Chronic kidney disease (CKD), stage IV (severe), Controlled type 2 diabetes mellitus with diabetic polyneuropathy, without long-term current use of insulin, and Insomnia, unspecified type were also pertinent to this visit.    Health maintenance needs, recent test results and goals of care discussed with pt and questions answered.           MALLORIE Wise, NP-C  [facility], [place] [address]          [1]   Patient Active Problem List  Diagnosis    Hypertension associated with diabetes    Mild intermittent asthma without complication    Vitamin D deficiency    Anxiety    Recurrent UTI    Controlled type 2 diabetes mellitus with diabetic polyneuropathy, without long-term current use of insulin    Family history of glaucoma    Primary open angle glaucoma of both eyes, mild stage    Hyperlipidemia associated with type 2 diabetes mellitus    Acquired hypothyroidism    History of colon polyps    GERD (gastroesophageal reflux  disease)    Bilateral hearing loss    Type 2 diabetes mellitus with stable proliferative retinopathy of both eyes, without long-term current use of insulin    Insomnia disorder    Encounter for osteoporosis screening in asymptomatic postmenopausal patient    Chronic kidney disease (CKD), stage IV (severe)   [2]   Current Outpatient Medications on File Prior to Visit   Medication Sig Dispense Refill    aspirin 81 MG Chew Take 1 tablet (81 mg total) by mouth every other day.  0    atropine 1% (ISOPTO ATROPINE) 1 % Drop Place 1 drop into the right eye 2 (two) times a day. 5 mL 1    blood sugar diagnostic (ONETOUCH VERIO TEST STRIPS) Strp CHECK TWICE DAILY 200 strip 6    blood sugar diagnostic Strp Check sugars daily prn 100 strip 3    brimonidine 0.2% (ALPHAGAN) 0.2 % Drop Place 1 drop into both eyes 2 (two) times a day. 15 mL 6    CALCIUM CARBONATE (CALCIUM 300 ORAL) Take by mouth.      cranberry conc-C-bacillus coag 250-30-50 mg-mg-million Tab Take by mouth.      cyanocobalamin (VITAMIN B-12) 1000 MCG tablet Take 1 tablet (1,000 mcg total) by mouth once daily.      lancets (ONETOUCH DELICA PLUS LANCET) 33 gauge Misc CHECK BLOOD SUGAR TWICE DAILY 200 each 3    latanoprost 0.005 % ophthalmic solution Place 1 drop into both eyes once daily. 2.5 mL 6    levothyroxine (SYNTHROID) 25 MCG tablet Take 1 tablet (25 mcg total) by mouth once daily. 90 tablet 3    metFORMIN (GLUCOPHAGE) 500 MG tablet Take 1 tablet (500 mg total) by mouth 2 (two) times daily with meals.      olmesartan-amLODIPin-hcthiazid (TRIBENZOR) 40-10-12.5 mg Tab Take 1 tablet by mouth once daily. 360 tablet 3    omeprazole (PRILOSEC) 40 MG capsule Take 1 capsule (40 mg total) by mouth once daily. For acid reflux 90 capsule 3    polyethylene glycol (GLYCOLAX) 17 gram/dose powder Take 17 g by mouth once daily.      prednisoLONE acetate (PRED FORTE) 1 % DrpS Place 1 drop into the right eye 2 (two) times daily. 10 mL 1    rosuvastatin (CRESTOR) 20 MG tablet  Take 1 tablet (20 mg total) by mouth once daily. 90 tablet 3    sertraline (ZOLOFT) 100 MG tablet Take 1 tablet (100 mg total) by mouth every evening. 30 tablet 1    SITagliptin phosphate (JANUVIA) 50 MG Tab Take 1 tablet (50 mg total) by mouth once daily.      blood-glucose meter kit To check BG 2 times daily, to use with insurance preferred meter  Dx:  E11.9 1 each 0    fluticasone-salmeterol diskus inhaler 250-50 mcg Inhale 1 puff into the lungs 2 (two) times daily. 60 each 11     No current facility-administered medications on file prior to visit.

## 2025-06-19 NOTE — ASSESSMENT & PLAN NOTE
GFR slightly improved from last week   Latest Reference Range & Units 06/12/25 14:33 06/18/25 10:01   Creatinine 0.5 - 1.4 mg/dL 1.9 (H) 1.6 (H)   eGFR >60 mL/min/1.73/m2 26 (L) 32 (L)   Advised tighter control of glucose  Hydration to maintain a normal blood pressure  Do not take B/P med when pressure is on lower side

## 2025-06-19 NOTE — ASSESSMENT & PLAN NOTE
Recent dose increase. Less anxiety reported, feels more calm, sleeping better  Appetite improved.

## 2025-07-10 ENCOUNTER — OFFICE VISIT (OUTPATIENT)
Dept: PRIMARY CARE CLINIC | Facility: CLINIC | Age: 80
End: 2025-07-10
Payer: MEDICARE

## 2025-07-10 VITALS
OXYGEN SATURATION: 96 % | HEIGHT: 62 IN | DIASTOLIC BLOOD PRESSURE: 58 MMHG | HEART RATE: 79 BPM | SYSTOLIC BLOOD PRESSURE: 116 MMHG | BODY MASS INDEX: 25.52 KG/M2 | WEIGHT: 138.69 LBS | TEMPERATURE: 98 F

## 2025-07-10 DIAGNOSIS — N18.4 CHRONIC KIDNEY DISEASE (CKD), STAGE IV (SEVERE): ICD-10-CM

## 2025-07-10 DIAGNOSIS — I15.2 HYPERTENSION ASSOCIATED WITH DIABETES: Primary | ICD-10-CM

## 2025-07-10 DIAGNOSIS — E11.59 HYPERTENSION ASSOCIATED WITH DIABETES: Primary | ICD-10-CM

## 2025-07-10 DIAGNOSIS — F41.9 ANXIETY: ICD-10-CM

## 2025-07-10 PROCEDURE — 3288F FALL RISK ASSESSMENT DOCD: CPT | Mod: CPTII,S$GLB,, | Performed by: NURSE PRACTITIONER

## 2025-07-10 PROCEDURE — 3078F DIAST BP <80 MM HG: CPT | Mod: CPTII,S$GLB,, | Performed by: NURSE PRACTITIONER

## 2025-07-10 PROCEDURE — 1157F ADVNC CARE PLAN IN RCRD: CPT | Mod: CPTII,S$GLB,, | Performed by: NURSE PRACTITIONER

## 2025-07-10 PROCEDURE — 99214 OFFICE O/P EST MOD 30 MIN: CPT | Mod: S$GLB,,, | Performed by: NURSE PRACTITIONER

## 2025-07-10 PROCEDURE — 1101F PT FALLS ASSESS-DOCD LE1/YR: CPT | Mod: CPTII,S$GLB,, | Performed by: NURSE PRACTITIONER

## 2025-07-10 PROCEDURE — 99999 PR PBB SHADOW E&M-EST. PATIENT-LVL IV: CPT | Mod: PBBFAC,,, | Performed by: NURSE PRACTITIONER

## 2025-07-10 PROCEDURE — 3074F SYST BP LT 130 MM HG: CPT | Mod: CPTII,S$GLB,, | Performed by: NURSE PRACTITIONER

## 2025-07-10 PROCEDURE — 1159F MED LIST DOCD IN RCRD: CPT | Mod: CPTII,S$GLB,, | Performed by: NURSE PRACTITIONER

## 2025-07-10 PROCEDURE — 1126F AMNT PAIN NOTED NONE PRSNT: CPT | Mod: CPTII,S$GLB,, | Performed by: NURSE PRACTITIONER

## 2025-07-10 RX ORDER — AMLODIPINE BESYLATE 10 MG/1
10 TABLET ORAL DAILY
Qty: 90 TABLET | Refills: 3 | Status: SHIPPED | OUTPATIENT
Start: 2025-07-10 | End: 2026-07-10

## 2025-07-10 RX ORDER — OLMESARTAN MEDOXOMIL 40 MG/1
40 TABLET ORAL DAILY
Qty: 90 TABLET | Refills: 3 | Status: SHIPPED | OUTPATIENT
Start: 2025-07-10 | End: 2026-07-10

## 2025-07-10 NOTE — ASSESSMENT & PLAN NOTE
B/P fluctuating from some elevation with low  Will stop HCTZ 12.5 mg and continue olmesar 40mg and amlodipine 10 mg  Monitor and record  F/U with Dr. Carlton

## 2025-07-10 NOTE — PROGRESS NOTES
Natalia Mckeon  07/10/2025  [id number]    Jen Carlton MD  Patient Care Team:  Jen Carlton MD as PCP - General (Internal Medicine)  ROSE Chairez MD as Consulting Physician (Ophthalmology)  Ricky Resendez MD as Consulting Physician (Gastroenterology)  Mesha Obrien MD (Obstetrics and Gynecology)  Jaspreet Phillip MD as Consulting Physician (Ophthalmology)  Jl Su MD as Consulting Physician (Otolaryngology)  Naveen, Samuel Monroy MD as Consulting Physician (Family Medicine)  [facility]     Future Appointments   Date Time Provider Department Center   7/15/2025 10:45 AM Jaspreet Phillip MD [facility]   7/17/2025  8:10 AM LABORATORY, [facility]   7/24/2025  2:20 PM Jen Carlton MD [id number] [place]   7/25/2025  1:30 PM ROSE Chairez MD [facility]       [facility]      Chief Complaint:  Chief Complaint   Patient presents with    Follow-up     No issues or concerns         Assessment/Plan:  1. Hypertension associated with diabetes  Overview:  Hypertension Medications              olmesartan-amLODIPin-hcthiazid (TRIBENZOR) 40-10-12.5 mg Tab Take 1 tablet by mouth once daily.             Assessment & Plan:  B/P fluctuating from some elevation with low  Will stop HCTZ 12.5 mg and continue olmesar 40mg and amlodipine 10 mg  Monitor and record  F/U with Dr. Carlton    Orders:  -     olmesartan (BENICAR) 40 MG tablet; Take 1 tablet (40 mg total) by mouth once daily.  Dispense: 90 tablet; Refill: 3  -     amLODIPine (NORVASC) 10 MG tablet; Take 1 tablet (10 mg total) by mouth once daily.  Dispense: 90 tablet; Refill: 3    2. Anxiety  Overview:  Sertraline 100 mg daily    Assessment & Plan:  Stable, continue current SSRI      3. Chronic kidney disease (CKD), stage IV (severe)  Assessment & Plan:  GFR improved in most recent renal  panel            Worry Score: 2  History of Present Illness:    Last visit with Dr. Carlton 2/6/2025   LOV with myself 6/12 and 6/19 - reviewed      She returns for follow up and lab review.  Kidney function has improved.   B/P still fluctuating   Most days 132/70, 116/63, 148/60. When BP low she does not take the medication. Fluctuations - highs and low   Earlier this year on Benicar - Olmmesartan and amlodipine  HCTZ - 12.5 mg prescribed in 3/25  Getting back to daily regimen - stop HCTZ 12.5 mg due to lower BPs    Recently had hyperglycemia but glucose is declining.       Denies fever, chills, URI symptoms, chest pain, SOB, palpitations, vomiting, diarrhea, no gross neuro deficits, urinary symptoms and leg swelling.      The following were reviewed: Active problem list, medication list, allergies, family history, social history, and Health Maintenance.     History:  Past Medical History:   Diagnosis Date    Anxiety     Asthma     Colon polyp     Diabetes mellitus, type 2     Diabetic retinopathy     DM (diabetes mellitus) 2008     11/07/2018    Hyperlipidemia     Hypertension     Hypothyroidism     Insomnia disorder 3/6/2025    Primary open angle glaucoma of both eyes, mild stage 1/2/2018    Pyelonephritis     Recurrent UTI     Dr. Lira follows    Stage 3a chronic kidney disease 3/9/2022     Past Surgical History:   Procedure Laterality Date    HYSTERECTOMY       Family History   Problem Relation Name Age of Onset    Cancer Mother          pancreatic    Hypertension Mother      Cancer Father          prostate    Stroke Brother      Cataracts Brother      Glaucoma Brother      Lupus Sister       Problem List[1]  Review of patient's allergies indicates:   Allergen Reactions    Sulfa (sulfonamide antibiotics) Anaphylaxis    Aspirin Other (See Comments)     Stomach pain    Methocarbamol     Jardiance [empagliflozin] Nausea And Vomiting, Rash and Other (See Comments)     Low blood pressure       PHQ-9        PHILIP-7       Medications:  Medications Ordered Prior to Encounter[2]    Medications have been reviewed and reconciled with patient at visit today.      Exam:  Vitals:    07/10/25 1509   BP: (!) 116/58   Pulse: 79   Temp: 97.7 °F (36.5 °C)     Weight: 62.9 kg (138 lb 10.7 oz)   Body mass index is 25.36 kg/m².      BP Readings from Last 3 Encounters:   07/10/25 (!) 116/58   06/19/25 138/78   06/12/25 (!) 100/52     Wt Readings from Last 3 Encounters:   07/10/25 62.9 kg (138 lb 10.7 oz)   06/19/25 62.8 kg (138 lb 7.2 oz)   06/12/25 62.4 kg (137 lb 7.3 oz)         Physical Exam  Vitals reviewed.   Constitutional:       General: She is not in acute distress.     Appearance: Normal appearance.   HENT:      Head: Normocephalic and atraumatic.      Nose: Nose normal.      Mouth/Throat:      Mouth: Mucous membranes are moist.   Eyes:      General: No scleral icterus.     Conjunctiva/sclera: Conjunctivae normal.   Cardiovascular:      Rate and Rhythm: Normal rate and regular rhythm.      Heart sounds: No murmur heard.  Pulmonary:      Effort: Pulmonary effort is normal. No respiratory distress.      Breath sounds: Normal breath sounds.   Abdominal:      Palpations: Abdomen is soft. There is no mass.      Tenderness: There is no abdominal tenderness.   Musculoskeletal:      Cervical back: Normal range of motion and neck supple.      Right lower leg: No edema.      Left lower leg: No edema.   Lymphadenopathy:      Cervical: No cervical adenopathy.   Skin:     General: Skin is warm and dry.   Neurological:      Mental Status: She is alert and oriented to person, place, and time. Mental status is at baseline.   Psychiatric:         Mood and Affect: Mood normal.         Thought Content: Thought content normal.              Laboratory Reviewed:     Lab Results   Component Value Date    WBC 6.05 05/01/2025    HGB 10.5 (L) 05/01/2025    HCT 32.8 (L) 05/01/2025     05/01/2025    CHOL 158 11/07/2024    TRIG 81 11/07/2024     HDL 64 11/07/2024    ALT 22 03/06/2025    AST 61 (H) 03/06/2025     (L) 06/18/2025    K 4.5 06/18/2025     06/18/2025    CREATININE 1.6 (H) 06/18/2025    BUN 23 06/18/2025    CO2 24 06/18/2025    TSH 2.152 06/10/2024    INR 1.0 09/21/2014    HGBA1C 8.4 (H) 03/06/2025       Screening or Prevention Patient's value Goal Complete/Controlled?   HgA1C Testing and Control   Lab Results   Component Value Date    HGBA1C 8.4 (H) 03/06/2025      Annually/Less than 8% No   Lipid profile : 11/07/2024 Annually Yes   LDL control Lab Results   Component Value Date    LDLCALC 77.8 11/07/2024    Annually/Less than 100 mg/dl  Yes   Nephropathy screening Lab Results   Component Value Date    LABMICR 13.0 03/06/2025     Lab Results   Component Value Date    PROTEINUA Negative 01/07/2025    Annually Yes   Blood pressure BP Readings from Last 1 Encounters:   07/10/25 (!) 116/58    Less than 140/90 Yes   Dilated retinal exam : 06/10/2025 Annually Yes   Foot exam   Most Recent Foot Exam Date: Not Found Annually Yes       Health Maintenance  Health Maintenance Topics with due status: Not Due       Topic Last Completion Date    TETANUS VACCINE 04/11/2024    Influenza Vaccine 09/12/2024    Lipid Panel 11/07/2024    Diabetes Urine Screening 03/06/2025    DEXA Scan 03/06/2025    Diabetic Eye Exam 06/10/2025     Health Maintenance Due   Topic Date Due    RSV Vaccine (Age 60+ and Pregnant patients) (1 - 1-dose 75+ series) Never done    COVID-19 Vaccine (5 - 2024-25 season) 09/01/2024    Hemoglobin A1c  06/06/2025               -Patient's lab results were reviewed and discussed with patient  -Treatment options and alternatives were discussed with the patient. Patient expressed understanding. Patient was given the opportunity to ask questions and be an active participant in their medical care. Patient had no further questions or concerns at this time.               After visit summary printed and given to patient upon discharge.  Patient  goals and care plan are included in After visit summary.      The following issues were discussed: The primary encounter diagnosis was Hypertension associated with diabetes. Diagnoses of Anxiety and Chronic kidney disease (CKD), stage IV (severe) were also pertinent to this visit.    Health maintenance needs, recent test results and goals of care discussed with pt and questions answered.           MALLORIE Wise, NP-C  [facility] [address] [place] [address]          [1]   Patient Active Problem List  Diagnosis    Hypertension associated with diabetes    Mild intermittent asthma without complication    Vitamin D deficiency    Anxiety    Recurrent UTI    Controlled type 2 diabetes mellitus with diabetic polyneuropathy, without long-term current use of insulin    Family history of glaucoma    Primary open angle glaucoma of both eyes, mild stage    Hyperlipidemia associated with type 2 diabetes mellitus    Acquired hypothyroidism    History of colon polyps    GERD (gastroesophageal reflux disease)    Bilateral hearing loss    Type 2 diabetes mellitus with stable proliferative retinopathy of both eyes, without long-term current use of insulin    Insomnia disorder    Encounter for osteoporosis screening in asymptomatic postmenopausal patient    Chronic kidney disease (CKD), stage IV (severe)   [2]   Current Outpatient Medications on File Prior to Visit   Medication Sig Dispense Refill    aspirin 81 MG Chew Take 1 tablet (81 mg total) by mouth every other day.  0    atropine 1% (ISOPTO ATROPINE) 1 % Drop Place 1 drop into the right eye 2 (two) times a day. 5 mL 1    blood sugar diagnostic (ONETOUCH VERIO TEST STRIPS) Strp CHECK TWICE DAILY 200 strip 6    blood sugar diagnostic Strp Check sugars daily prn 100 strip 3    brimonidine 0.2% (ALPHAGAN) 0.2 % Drop Place 1 drop into both eyes 2 (two) times a day. 15 mL 6    CALCIUM CARBONATE (CALCIUM 300 ORAL) Take by mouth.      cranberry  conc-C-bacillus coag 250-30-50 mg-mg-million Tab Take by mouth.      cyanocobalamin (VITAMIN B-12) 1000 MCG tablet Take 1 tablet (1,000 mcg total) by mouth once daily.      lancets (ONETOUCH DELICA PLUS LANCET) 33 gauge Misc CHECK BLOOD SUGAR TWICE DAILY 200 each 3    latanoprost 0.005 % ophthalmic solution Place 1 drop into both eyes once daily. 2.5 mL 6    levothyroxine (SYNTHROID) 25 MCG tablet Take 1 tablet (25 mcg total) by mouth once daily. 90 tablet 3    metFORMIN (GLUCOPHAGE) 500 MG tablet Take 1 tablet (500 mg total) by mouth 2 (two) times daily with meals.      omeprazole (PRILOSEC) 40 MG capsule Take 1 capsule (40 mg total) by mouth once daily. For acid reflux 90 capsule 3    polyethylene glycol (GLYCOLAX) 17 gram/dose powder Take 17 g by mouth once daily.      prednisoLONE acetate (PRED FORTE) 1 % DrpS Place 1 drop into the right eye 2 (two) times daily. 10 mL 1    rosuvastatin (CRESTOR) 20 MG tablet Take 1 tablet (20 mg total) by mouth once daily. 90 tablet 3    sertraline (ZOLOFT) 100 MG tablet Take 1 tablet (100 mg total) by mouth every evening. 30 tablet 1    SITagliptin phosphate (JANUVIA) 50 MG Tab Take 1 tablet (50 mg total) by mouth once daily.      [DISCONTINUED] olmesartan-amLODIPin-hcthiazid (TRIBENZOR) 40-10-12.5 mg Tab Take 1 tablet by mouth once daily. 360 tablet 3    blood-glucose meter kit To check BG 2 times daily, to use with insurance preferred meter  Dx:  E11.9 1 each 0    fluticasone-salmeterol diskus inhaler 250-50 mcg Inhale 1 puff into the lungs 2 (two) times daily. 60 each 11     No current facility-administered medications on file prior to visit.

## 2025-07-11 ENCOUNTER — TELEPHONE (OUTPATIENT)
Dept: PRIMARY CARE CLINIC | Facility: CLINIC | Age: 80
End: 2025-07-11
Payer: MEDICARE

## 2025-07-11 DIAGNOSIS — E11.69 HYPERLIPIDEMIA ASSOCIATED WITH TYPE 2 DIABETES MELLITUS: Primary | ICD-10-CM

## 2025-07-11 DIAGNOSIS — E78.5 HYPERLIPIDEMIA ASSOCIATED WITH TYPE 2 DIABETES MELLITUS: Primary | ICD-10-CM

## 2025-07-11 NOTE — PROGRESS NOTES
Subjective:      Patient ID: Natalia Mckeon is a 80 y.o. female.    Chief Complaint: Follow-up (3 month f/u ) and Medication Refill      HPI  Here for f/u medical problems and preventive exam.  Good energy, AC breakfast sugars usually around 128.  Walking 1 mile, and aerobics regularly.  No f/c/sw/cough.  No cp/sob/palp.  BMs good on miralax/water.  Urine normal.    HM: 9/24 fluvax, 1/21 covid vaccines/booster, 4/24 HAV, 7/15 [id number], 2/18 booster [id number], 7/23 [id number], 4/24 Tdap, 10/14 zostervax at pharmacy, 2019 Shingrix x2, 12/24 [facility]/Gyn Dr. Obrien, bmd per her, 3/24 Cscope Dr. Auguste rep 5y, 9/24 Eye Dr. Phillip, 11/16 HCV neg, Cards Dr. Jm Vasquez, Urol Dr. Lira.      Review of Systems   Constitutional:  Negative for appetite change, chills, diaphoresis and fever.   HENT:  Negative for congestion, ear pain, rhinorrhea, sinus pressure and sore throat.    Respiratory:  Negative for cough, chest tightness and shortness of breath.    Cardiovascular:  Negative for chest pain and palpitations.   Gastrointestinal:  Negative for blood in stool, constipation, diarrhea, nausea and vomiting.   Genitourinary:  Negative for dysuria, frequency, hematuria, menstrual problem, urgency and vaginal discharge.   Musculoskeletal:  Negative for arthralgias.   Skin:  Negative for rash.   Neurological:  Negative for dizziness and headaches.   Psychiatric/Behavioral:  Negative for sleep disturbance. The patient is not nervous/anxious.          Objective:   There were no vitals taken for this visit.    Physical Exam  Constitutional:       Appearance: She is well-developed.   HENT:      Right Ear: External ear normal. Tympanic membrane is not injected.      Left Ear: External ear normal. Tympanic membrane is not injected.   Eyes:      Conjunctiva/sclera: Conjunctivae normal.   Neck:      Thyroid: No thyromegaly.   Cardiovascular:      Rate and Rhythm: Normal rate and regular rhythm.      Pulses:           Dorsalis pedis  pulses are 2+ on the right side and 2+ on the left side.        Posterior tibial pulses are 2+ on the right side and 2+ on the left side.      Heart sounds: No murmur heard.     No friction rub. No gallop.   Pulmonary:      Effort: Pulmonary effort is normal.      Breath sounds: Normal breath sounds. No wheezing or rales.   Abdominal:      General: Bowel sounds are normal.      Palpations: Abdomen is soft. There is no mass.      Tenderness: There is no abdominal tenderness.   Musculoskeletal:      Cervical back: Normal range of motion and neck supple.   Feet:      Right foot:      Protective Sensation: 10 sites tested.  10 sites sensed.      Skin integrity: No ulcer, blister, skin breakdown, erythema, warmth, callus or dry skin.      Left foot:      Protective Sensation: 10 sites tested.  10 sites sensed.      Skin integrity: No ulcer, blister, skin breakdown, erythema, warmth, callus or dry skin.   Lymphadenopathy:      Cervical: No cervical adenopathy.   Skin:     General: Skin is warm.      Findings: No rash.   Neurological:      Mental Status: She is alert and oriented to person, place, and time.        Latest Reference Range & Units 03/06/25 15:05 05/01/25 11:13 06/12/25 14:33 06/18/25 10:01 07/17/25 08:17   WBC 3.90 - 12.70 K/uL  6.05   5.88   RBC 4.00 - 5.40 M/uL  4.00   4.00   Hemoglobin 12.0 - 16.0 gm/dL  10.5 (L)   10.8 (L)   Hematocrit 37.0 - 48.5 %  32.8 (L)   33.3 (L)   MCV 82 - 98 fL  82   83   MCH 27.0 - 31.0 pg  26.3 (L)   27.0   MCHC 32.0 - 36.0 g/dL  32.0   32.4   RDW 11.5 - 14.5 %  14.5   14.2   Platelet Count 150 - 450 K/uL  212   214   MPV 9.2 - 12.9 fL  11.8   11.3   Neut % 38 - 73 %  72.2   71.0   Lymph % 18 - 48 %  18.5   19.4   Mono % 4 - 15 %  6.8   7.0   Eos % <=8 %  2.1   2.0   Basophil % <=1.9 %  0.2   0.3   Immature Granulocytes 0.0 - 0.5 %  0.2   0.3   Gran # (ANC) 1.8 - 7.7 K/uL  4.37   4.17   Lymph # 1 - 4.8 K/uL  1.12   1.14   Mono # 0.3 - 1 K/uL  0.41   0.41   Eos # <=0.5 K/uL   0.13   0.12   Baso # <=0.2 K/uL  0.01   0.02   Immature Grans (Abs) 0.00 - 0.04 K/uL  0.01   0.02   nRBC <=0 /100 WBC  0   0   Sodium 136 - 145 mmol/L  138 135 (L) 135 (L) 140   Potassium 3.5 - 5.1 mmol/L  4.6 5.3 (H) 4.5 4.3   Chloride 95 - 110 mmol/L  103 99 103 105   CO2 23 - 29 mmol/L  28 25 24 29   Anion Gap 8 - 16 mmol/L  7 (L) 11 8 6 (L)   BUN 8 - 23 mg/dL  32 (H) 26 (H) 23 28 (H)   Creatinine 0.5 - 1.4 mg/dL  1.6 (H) 1.9 (H) 1.6 (H) 1.7 (H)   eGFR >60 mL/min/1.73/m2  32 (L) 26 (L) 32 (L) 30 (L)   Glucose 70 - 110 mg/dL  162 (H) 162 (H) 190 (H) 164 (H)   Calcium 8.7 - 10.5 mg/dL  9.3 9.2 9.0 9.4   Phosphorus Level 2.7 - 4.5 mg/dL  3.4      Magnesium  1.6 - 2.6 mg/dL  2.0      ALP 40 - 150 unit/L     95   PROTEIN TOTAL 6.0 - 8.4 gm/dL     7.3   Albumin 3.5 - 5.2 g/dL     4.1   BILIRUBIN TOTAL 0.1 - 1.0 mg/dL     0.3   AST 11 - 45 unit/L     27   ALT 10 - 44 unit/L     30   Cholesterol Total 120 - 199 mg/dL     129   HDL 40 - 75 mg/dL     59   HDL/Cholesterol Ratio 20.0 - 50.0 %     45.7   Non-HDL Cholesterol mg/dL     70   Total Cholesterol/HDL Ratio 2.0 - 5.0      2.2   Triglycerides 30 - 150 mg/dL     97   LDL Cholesterol 63.0 - 159.0 mg/dL     50.6 (L)   Hemoglobin A1C External 4.0 - 5.6 %     7.6 (H)   Estimated Avg Glucose 68 - 131 mg/dL     171 (H)   TSH 0.400 - 4.000 uIU/mL     3.879   Urine Creatinine 15.0 - 325.0 mg/dL 75.0       Urine Microalbumin ug/mL 13.0       MICROALB/CREAT RATIO 0.0 - 30.0 ug/mg 17.3             Assessment:       1. Acquired hypothyroidism    2. Anxiety    3. Chronic kidney disease (CKD), stage IV (severe)    4. Controlled type 2 diabetes mellitus with diabetic polyneuropathy, without long-term current use of insulin    5. Hyperlipidemia associated with type 2 diabetes mellitus    6. Mild intermittent asthma without complication          Plan:     1. Acquired hypothyroidism  Overview:  Synthroid 25mcg daily.    Assessment & Plan:  Clinically stable, continue present  treatment.        2. Anxiety  Overview:  Sertraline 100 mg daily    Assessment & Plan:  Doing well, cont rx.      3. Chronic kidney disease (CKD), stage IV (severe)  Assessment & Plan:  Hovering 3b/4, cont to follow closely.      4. Controlled type 2 diabetes mellitus with diabetic polyneuropathy, without long-term current use of insulin  Overview:  Metformin 500mg BID  Januvia 50 mg daily  .    Assessment & Plan:  Adeq control, cont rxs.      5. Hyperlipidemia associated with type 2 diabetes mellitus  Overview:  Rosuva 20mg daily.    Assessment & Plan:  LDL at goal, cont current regimen.LDL 50.      6. Mild intermittent asthma without complication  Overview:  Advair 250-50, 1p BID.    Assessment & Plan:  Doing well, cont rx.

## 2025-07-12 DIAGNOSIS — F41.9 ANXIETY: ICD-10-CM

## 2025-07-14 RX ORDER — SERTRALINE HYDROCHLORIDE 100 MG/1
100 TABLET, FILM COATED ORAL NIGHTLY
Qty: 30 TABLET | Refills: 1 | Status: SHIPPED | OUTPATIENT
Start: 2025-07-14

## 2025-07-15 ENCOUNTER — OFFICE VISIT (OUTPATIENT)
Dept: OPHTHALMOLOGY | Facility: CLINIC | Age: 80
End: 2025-07-15
Payer: MEDICARE

## 2025-07-15 DIAGNOSIS — Z91.199 NON-COMPLIANCE: ICD-10-CM

## 2025-07-15 DIAGNOSIS — H40.1131 PRIMARY OPEN ANGLE GLAUCOMA OF BOTH EYES, MILD STAGE: ICD-10-CM

## 2025-07-15 DIAGNOSIS — H40.51X3 NVG (NEOVASCULAR GLAUCOMA), RIGHT, SEVERE STAGE: Primary | ICD-10-CM

## 2025-07-15 PROCEDURE — 1160F RVW MEDS BY RX/DR IN RCRD: CPT | Mod: CPTII,S$GLB,, | Performed by: OPHTHALMOLOGY

## 2025-07-15 PROCEDURE — 1159F MED LIST DOCD IN RCRD: CPT | Mod: CPTII,S$GLB,, | Performed by: OPHTHALMOLOGY

## 2025-07-15 PROCEDURE — 1157F ADVNC CARE PLAN IN RCRD: CPT | Mod: CPTII,S$GLB,, | Performed by: OPHTHALMOLOGY

## 2025-07-15 PROCEDURE — 99214 OFFICE O/P EST MOD 30 MIN: CPT | Mod: S$GLB,,, | Performed by: OPHTHALMOLOGY

## 2025-07-15 PROCEDURE — 99999 PR PBB SHADOW E&M-EST. PATIENT-LVL III: CPT | Mod: PBBFAC,,, | Performed by: OPHTHALMOLOGY

## 2025-07-15 RX ORDER — NETARSUDIL AND LATANOPROST OPHTHALMIC SOLUTION, 0.02%/0.005% .2; .05 MG/ML; MG/ML
1 SOLUTION/ DROPS OPHTHALMIC; TOPICAL DAILY
Qty: 2.5 ML | Refills: 11 | Status: SHIPPED | OUTPATIENT
Start: 2025-07-15

## 2025-07-15 RX ORDER — BRIMONIDINE TARTRATE 2 MG/ML
1 SOLUTION/ DROPS OPHTHALMIC 2 TIMES DAILY
Qty: 15 ML | Refills: 6 | Status: SHIPPED | OUTPATIENT
Start: 2025-07-15 | End: 2026-07-15

## 2025-07-15 RX ORDER — ATROPINE SULFATE 10 MG/ML
1 SOLUTION/ DROPS OPHTHALMIC 2 TIMES DAILY
Qty: 5 ML | Refills: 1 | Status: SHIPPED | OUTPATIENT
Start: 2025-07-15

## 2025-07-15 RX ORDER — DORZOLAMIDE HCL 20 MG/ML
1 SOLUTION/ DROPS OPHTHALMIC 2 TIMES DAILY
Qty: 10 ML | Refills: 1 | Status: SHIPPED | OUTPATIENT
Start: 2025-07-15 | End: 2026-07-15

## 2025-07-15 NOTE — PATIENT INSTRUCTIONS
Atropine 2xs daily- right eye   Trusopt 2xs daily- right eye   Brimonidine 2xs daily- both eyes  Rocklatan 1x daily- both eyes

## 2025-07-15 NOTE — PROGRESS NOTES
HPI     Glaucoma            Comments: IOP check/never came back for PRP with [facility]    Patient states OU is doing well, ran out of Pred recently and states she   feels she did not need the laser the surgery with [facility] but if it is needed   she will schedule it.          Comments    DM SINCE 2006  BDR  H/o DME OD  VMA  +FAMHX-GLAUCOMA-BROTHER  NVG S/P emergent Avastin OD 06/10/25   ALLERGY - SULFA- THEREFORE CAN'T TAKE DIAMOX     Atropine 2 x right eye   Pred 4 x right eye   Trusopt 2 x daily right eye   Brimonidine 2 x daily both eyes  Rocklatan 1 x both eyes    Emergent Avastin OD 6/10/25             Last edited by Anastasia Beahc, Patient Care Assistant on 7/15/2025 11:13   AM.            Assessment /Plan     For exam results, see Encounter Report.      ICD-10-CM ICD-9-CM    1. NVG (neovascular glaucoma), right, severe stage  H40.51X3 365.63 IOP OD improved today  Continue drops as below  Pt deferred PRP treatment with [facility]  Seeing [facility] in 2 weeks, will reassess at that time     365.73       2. Primary open angle glaucoma of both eyes, mild stage  H40.1131 365.11 See above      365.71       3. Non-compliance  Z91.199 V15.81           Return to clinic with [facility] as scheduled on 7/25/25      Atropine 2xs daily- right eye   Trusopt 2xs daily- right eye   Brimonidine 2xs daily- both eyes  Rocklatan 1x daily- both eyes

## 2025-07-17 ENCOUNTER — LAB VISIT (OUTPATIENT)
Dept: LAB | Facility: HOSPITAL | Age: 80
End: 2025-07-17
Attending: NURSE PRACTITIONER
Payer: MEDICARE

## 2025-07-17 DIAGNOSIS — E78.5 HYPERLIPIDEMIA ASSOCIATED WITH TYPE 2 DIABETES MELLITUS: ICD-10-CM

## 2025-07-17 DIAGNOSIS — E11.3553 TYPE 2 DIABETES MELLITUS WITH STABLE PROLIFERATIVE RETINOPATHY OF BOTH EYES, WITHOUT LONG-TERM CURRENT USE OF INSULIN: ICD-10-CM

## 2025-07-17 DIAGNOSIS — E11.69 HYPERLIPIDEMIA ASSOCIATED WITH TYPE 2 DIABETES MELLITUS: ICD-10-CM

## 2025-07-17 LAB
ABSOLUTE EOSINOPHIL (OHS): 0.12 K/UL
ABSOLUTE MONOCYTE (OHS): 0.41 K/UL (ref 0.3–1)
ABSOLUTE NEUTROPHIL COUNT (OHS): 4.17 K/UL (ref 1.8–7.7)
ALBUMIN SERPL BCP-MCNC: 4.1 G/DL (ref 3.5–5.2)
ALP SERPL-CCNC: 95 UNIT/L (ref 40–150)
ALT SERPL W/O P-5'-P-CCNC: 30 UNIT/L (ref 10–44)
ANION GAP (OHS): 6 MMOL/L (ref 8–16)
AST SERPL-CCNC: 27 UNIT/L (ref 11–45)
BASOPHILS # BLD AUTO: 0.02 K/UL
BASOPHILS NFR BLD AUTO: 0.3 %
BILIRUB SERPL-MCNC: 0.3 MG/DL (ref 0.1–1)
BUN SERPL-MCNC: 28 MG/DL (ref 8–23)
CALCIUM SERPL-MCNC: 9.4 MG/DL (ref 8.7–10.5)
CHLORIDE SERPL-SCNC: 105 MMOL/L (ref 95–110)
CHOLEST SERPL-MCNC: 129 MG/DL (ref 120–199)
CHOLEST/HDLC SERPL: 2.2 {RATIO} (ref 2–5)
CO2 SERPL-SCNC: 29 MMOL/L (ref 23–29)
CREAT SERPL-MCNC: 1.7 MG/DL (ref 0.5–1.4)
EAG (OHS): 171 MG/DL (ref 68–131)
ERYTHROCYTE [DISTWIDTH] IN BLOOD BY AUTOMATED COUNT: 14.2 % (ref 11.5–14.5)
GFR SERPLBLD CREATININE-BSD FMLA CKD-EPI: 30 ML/MIN/1.73/M2
GLUCOSE SERPL-MCNC: 164 MG/DL (ref 70–110)
HBA1C MFR BLD: 7.6 % (ref 4–5.6)
HCT VFR BLD AUTO: 33.3 % (ref 37–48.5)
HDLC SERPL-MCNC: 59 MG/DL (ref 40–75)
HDLC SERPL: 45.7 % (ref 20–50)
HGB BLD-MCNC: 10.8 GM/DL (ref 12–16)
IMM GRANULOCYTES # BLD AUTO: 0.02 K/UL (ref 0–0.04)
IMM GRANULOCYTES NFR BLD AUTO: 0.3 % (ref 0–0.5)
LDLC SERPL CALC-MCNC: 50.6 MG/DL (ref 63–159)
LYMPHOCYTES # BLD AUTO: 1.14 K/UL (ref 1–4.8)
MCH RBC QN AUTO: 27 PG (ref 27–31)
MCHC RBC AUTO-ENTMCNC: 32.4 G/DL (ref 32–36)
MCV RBC AUTO: 83 FL (ref 82–98)
NONHDLC SERPL-MCNC: 70 MG/DL
NUCLEATED RBC (/100WBC) (OHS): 0 /100 WBC
PLATELET # BLD AUTO: 214 K/UL (ref 150–450)
PMV BLD AUTO: 11.3 FL (ref 9.2–12.9)
POTASSIUM SERPL-SCNC: 4.3 MMOL/L (ref 3.5–5.1)
PROT SERPL-MCNC: 7.3 GM/DL (ref 6–8.4)
RBC # BLD AUTO: 4 M/UL (ref 4–5.4)
RELATIVE EOSINOPHIL (OHS): 2 %
RELATIVE LYMPHOCYTE (OHS): 19.4 % (ref 18–48)
RELATIVE MONOCYTE (OHS): 7 % (ref 4–15)
RELATIVE NEUTROPHIL (OHS): 71 % (ref 38–73)
SODIUM SERPL-SCNC: 140 MMOL/L (ref 136–145)
TRIGL SERPL-MCNC: 97 MG/DL (ref 30–150)
TSH SERPL-ACNC: 3.88 UIU/ML (ref 0.4–4)
WBC # BLD AUTO: 5.88 K/UL (ref 3.9–12.7)

## 2025-07-17 PROCEDURE — 36415 COLL VENOUS BLD VENIPUNCTURE: CPT

## 2025-07-17 PROCEDURE — 82040 ASSAY OF SERUM ALBUMIN: CPT

## 2025-07-17 PROCEDURE — 82465 ASSAY BLD/SERUM CHOLESTEROL: CPT

## 2025-07-17 PROCEDURE — 85025 COMPLETE CBC W/AUTO DIFF WBC: CPT

## 2025-07-17 PROCEDURE — 83036 HEMOGLOBIN GLYCOSYLATED A1C: CPT

## 2025-07-17 PROCEDURE — 84443 ASSAY THYROID STIM HORMONE: CPT

## 2025-07-24 ENCOUNTER — OFFICE VISIT (OUTPATIENT)
Dept: PRIMARY CARE CLINIC | Facility: CLINIC | Age: 80
End: 2025-07-24
Payer: MEDICARE

## 2025-07-24 VITALS
HEART RATE: 86 BPM | WEIGHT: 139.56 LBS | OXYGEN SATURATION: 97 % | HEIGHT: 62 IN | TEMPERATURE: 98 F | DIASTOLIC BLOOD PRESSURE: 62 MMHG | SYSTOLIC BLOOD PRESSURE: 142 MMHG | BODY MASS INDEX: 25.68 KG/M2

## 2025-07-24 DIAGNOSIS — E03.9 ACQUIRED HYPOTHYROIDISM: Primary | ICD-10-CM

## 2025-07-24 DIAGNOSIS — E11.69 HYPERLIPIDEMIA ASSOCIATED WITH TYPE 2 DIABETES MELLITUS: ICD-10-CM

## 2025-07-24 DIAGNOSIS — F41.9 ANXIETY: ICD-10-CM

## 2025-07-24 DIAGNOSIS — J45.20 MILD INTERMITTENT ASTHMA WITHOUT COMPLICATION: ICD-10-CM

## 2025-07-24 DIAGNOSIS — E78.5 HYPERLIPIDEMIA ASSOCIATED WITH TYPE 2 DIABETES MELLITUS: ICD-10-CM

## 2025-07-24 DIAGNOSIS — E11.42 CONTROLLED TYPE 2 DIABETES MELLITUS WITH DIABETIC POLYNEUROPATHY, WITHOUT LONG-TERM CURRENT USE OF INSULIN: ICD-10-CM

## 2025-07-24 DIAGNOSIS — N18.4 CHRONIC KIDNEY DISEASE (CKD), STAGE IV (SEVERE): ICD-10-CM

## 2025-07-24 DIAGNOSIS — D64.9 RELATIVE ANEMIA: ICD-10-CM

## 2025-07-24 PROCEDURE — 99999 PR PBB SHADOW E&M-EST. PATIENT-LVL IV: CPT | Mod: PBBFAC,,, | Performed by: INTERNAL MEDICINE

## 2025-08-29 DIAGNOSIS — F41.9 ANXIETY: ICD-10-CM

## 2025-08-29 RX ORDER — SERTRALINE HYDROCHLORIDE 100 MG/1
100 TABLET, FILM COATED ORAL NIGHTLY
Qty: 90 TABLET | Refills: 1 | Status: SHIPPED | OUTPATIENT
Start: 2025-08-29